# Patient Record
Sex: FEMALE | Race: BLACK OR AFRICAN AMERICAN | NOT HISPANIC OR LATINO | Employment: STUDENT | URBAN - METROPOLITAN AREA
[De-identification: names, ages, dates, MRNs, and addresses within clinical notes are randomized per-mention and may not be internally consistent; named-entity substitution may affect disease eponyms.]

---

## 2018-01-22 ENCOUNTER — DOCUMENTATION ONLY (OUTPATIENT)
Dept: FAMILY MEDICINE | Facility: CLINIC | Age: 23
End: 2018-01-22

## 2018-01-22 ENCOUNTER — OFFICE VISIT (OUTPATIENT)
Dept: FAMILY MEDICINE | Facility: CLINIC | Age: 23
End: 2018-01-22
Payer: COMMERCIAL

## 2018-01-22 VITALS
DIASTOLIC BLOOD PRESSURE: 52 MMHG | SYSTOLIC BLOOD PRESSURE: 94 MMHG | OXYGEN SATURATION: 97 % | WEIGHT: 114 LBS | HEIGHT: 62 IN | BODY MASS INDEX: 20.98 KG/M2 | HEART RATE: 62 BPM

## 2018-01-22 DIAGNOSIS — Z76.89 ESTABLISHING CARE WITH NEW DOCTOR, ENCOUNTER FOR: Primary | ICD-10-CM

## 2018-01-22 DIAGNOSIS — T78.1XXA ALLERGIC REACTION TO FOOD, INITIAL ENCOUNTER: ICD-10-CM

## 2018-01-22 DIAGNOSIS — Z00.00 WELLNESS EXAMINATION: ICD-10-CM

## 2018-01-22 PROCEDURE — 99999 PR PBB SHADOW E&M-NEW PATIENT-LVL IV: CPT | Mod: PBBFAC,,, | Performed by: FAMILY MEDICINE

## 2018-01-22 PROCEDURE — 99395 PREV VISIT EST AGE 18-39: CPT | Mod: S$GLB,,, | Performed by: FAMILY MEDICINE

## 2018-01-22 NOTE — PROGRESS NOTES
Subjective:       Patient ID: Cristina Curiel is a 22 year old female here to establish care     Chief Complaint: Establish care     HPI      Cristina Curiel is a 21 yo female here to establish care. Today pt is primarily concerned about:  - Possible allergies. Pt states she has discovered allergies to multiple agents. For example, her current laundry detergent leads to hives, as does food like pineapple. She is also allergic to zithromax (hives) and possibly peanut butter (N/V). She would like to further investigate these concerns.  - Difficulty gaining weight. Current BMI 20.85. States her diet has not been regular. Will skip breakfast and eat a light lunch/dinner. Also concern about allergies limits her diets. Denies hand tremors, palptations, sweating, but will get hot at night while sleeping.  - Possible pap smear     She has a past hx of migraines, with last migraine occurring a few months ago. Takes ibuprofen as needed.She is not currently on any regular medications. Family hx of HTN and stroke. Surgical hx of hernia repair (2004) and R ACL/meniscus repair (2007).      Menstrual cycle started at 15 yo. Regular, no previous concerns with spotting, bleeding or pain. Currently on Nexplanon and uses condoms irregularly. Sexually active with one male partner. Has had STD screens in the past and has never had STD.     She does not smoke or drink. Has smoked marijuana in the past but does not now.  Wears seatbelt regularly.   Denies symptoms of depression.     Review of Systems   Constitutional: Negative for chills and fever.   HENT: Negative for sore throat.    Eyes: Negative for visual disturbance.   Respiratory: Negative for cough and shortness of breath.    Cardiovascular: Negative for chest pain and leg swelling.   Gastrointestinal: Negative for abdominal pain, constipation and vomiting.   Genitourinary: Negative for difficulty urinating, dysuria and hematuria.   Musculoskeletal: Negative for arthralgias.    Neurological: Negative for dizziness and weakness.      Objective:   Physical Exam   Constitutional: She appears well-developed and well-nourished. No distress.   HENT:   Head: Normocephalic and atraumatic.   Mouth/Throat: Oropharynx is clear and moist. No oropharyngeal exudate.   Eyes: EOM are normal. Pupils are equal, round, and reactive to light.   Neck: Normal range of motion. Neck supple. No thyromegaly present.   Cardiovascular: Normal rate, regular rhythm, normal heart sounds and intact distal pulses.    Pulmonary/Chest: Effort normal and breath sounds normal. No respiratory distress. She has no wheezes.   Abdominal: Soft. Bowel sounds are normal. She exhibits no distension and no mass. There is no tenderness.   Musculoskeletal: She exhibits no edema.   Lymphadenopathy:     She has no cervical adenopathy.   Neurological: She is alert.   Skin: Skin is warm. No rash noted. No erythema.   Psychiatric: She has a normal mood and affect. Her behavior is normal.   Vitals reviewed.     Assessment:        1. Establishing care with new doctor, encounter for  2. Wellness examination  3. Allergic reaction to food, initial encounter       Plan:      1. Establishing care with new doctor, encounter for     2. Wellness examination  Patient has been advised to continue to maintain a healthy lifestyle, including regular exercise and consuming a well balanced diet.   - Microalbumin/creatinine urine ratio; Future  - Comprehensive metabolic panel; Future  - CBC auto differential; Future  - TSH; Future  - Lipid panel; Future     3. Allergic reaction to food, initial encounter  - Ambulatory referral to Nutrition Services  - Ambulatory referral to Allergy    Patient to return to clinic in approximately one month for Pap smear and Nexplanon removal.   Patient likely will need to start Depo-Provera for contraception in the future as she has issues with low body mass.    Portions of this note were created using Dragon voice  recognition software. There may be voice recognition errors found in the text, and attempts were made to correct these errors prior to signature    Nabor Abraham MD    Family Medicine  1/22/2018

## 2018-01-22 NOTE — PROGRESS NOTES
Pre-Visit Chart Review  For Appointment Scheduled on (1/22/18)    Health Maintenance Due   Topic Date Due    Lipid Panel  1997    HPV Vaccines (1 of 3 - Female 3 Dose Series) 12/13/2008    TETANUS VACCINE  12/13/2015    Influenza Vaccine  08/01/2017

## 2018-01-22 NOTE — MEDICAL/APP STUDENT
Subjective:       Patient ID: Cristina Curiel is a 22 year old female here to establish care    Chief Complaint: Establish care     HPI      Cristina Curiel is a 21 yo female here to establish care. Today pt is primarily concerned about:  - Possible allergies. Pt states she has discovered allergies to multiple agents. For example, her current laundry detergent leads to hives, as does food like pineapple. She is also allergic to zithromax (hives) and possibly peanut butter (N/V). She would like to further investigate these concerns.  - Difficulty gaining weight. Current BMI 20.85. States her diet has not been regular. Will skip breakfast and eat a light lunch/dinner. Also concern about allergies limits her diets. Denies hand tremors, palptations, sweating, but will get hot at night while sleeping.  - Possible pap smear    She has a past hx of migraines, with last migraine occurring a few months ago. Takes ibuprofen as needed.She is not currently on any regular medications. Family hx of HTN and stroke. Surgical hx of hernia repair (2004) and R ACL/meniscus repair (2007).     Menstrual cycle started at 15 yo. Regular, no previous concerns with spotting, bleeding or pain. Currently on Nexplanon and uses condoms irregularly. Sexually active with one male partner. Has had STD screens in the past and has never had STD.    She does not smoke or drink. Has smoked marijuana in the past but does not now.  Wears seatbelt regularly.   Denies symptoms of depression.    Review of Systems   Constitutional: Negative for chills and fever.   HENT: Negative for sore throat.    Eyes: Negative for visual disturbance.   Respiratory: Negative for cough and shortness of breath.    Cardiovascular: Negative for chest pain and leg swelling.   Gastrointestinal: Negative for abdominal pain, constipation and vomiting.   Genitourinary: Negative for difficulty urinating, dysuria and hematuria.   Musculoskeletal: Negative for arthralgias.   Neurological:  Negative for dizziness and weakness.       Objective:   Physical Exam   Constitutional: She appears well-developed and well-nourished. No distress.   HENT:   Head: Normocephalic and atraumatic.   Mouth/Throat: Oropharynx is clear and moist. No oropharyngeal exudate.   Eyes: EOM are normal. Pupils are equal, round, and reactive to light.   Neck: Normal range of motion. Neck supple. No thyromegaly present.   Cardiovascular: Normal rate, regular rhythm, normal heart sounds and intact distal pulses.    Pulmonary/Chest: Effort normal and breath sounds normal. No respiratory distress. She has no wheezes.   Abdominal: Soft. Bowel sounds are normal. She exhibits no distension and no mass. There is no tenderness.   Musculoskeletal: She exhibits no edema.   Lymphadenopathy:     She has no cervical adenopathy.   Neurological: She is alert.   Skin: Skin is warm. No rash noted. No erythema.   Psychiatric: She has a normal mood and affect. Her behavior is normal.   Vitals reviewed.      Assessment:        1. Establishing care with new doctor, encounter for  2. Wellness examination  3. Allergic reaction to food, initial encounter       Plan:     1. Establishing care with new doctor, encounter for    2. Wellness examination  - Microalbumin/creatinine urine ratio; Future  - Comprehensive metabolic panel; Future  - CBC auto differential; Future  - TSH; Future  - Lipid panel; Future    3. Allergic reaction to food, initial encounter  - Ambulatory referral to Nutrition Services  - Ambulatory referral to Allergy

## 2018-02-28 ENCOUNTER — HOSPITAL ENCOUNTER (EMERGENCY)
Facility: HOSPITAL | Age: 23
Discharge: HOME | End: 2018-02-28
Attending: EMERGENCY MEDICINE
Payer: COMMERCIAL

## 2018-02-28 VITALS
OXYGEN SATURATION: 100 % | SYSTOLIC BLOOD PRESSURE: 117 MMHG | RESPIRATION RATE: 20 BRPM | BODY MASS INDEX: 21.16 KG/M2 | TEMPERATURE: 99 F | WEIGHT: 115 LBS | HEIGHT: 62 IN | DIASTOLIC BLOOD PRESSURE: 61 MMHG | HEART RATE: 71 BPM

## 2018-02-28 DIAGNOSIS — S20.212A RIB CONTUSION, LEFT, INITIAL ENCOUNTER: Primary | ICD-10-CM
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
## 2018-02-28 LAB
B-HCG UR QL: NEGATIVE
CTP QC/QA: YES

## 2018-02-28 PROCEDURE — 25000003 PHARM REV CODE 250: Performed by: NURSE PRACTITIONER

## 2018-02-28 PROCEDURE — 99284 EMERGENCY DEPT VISIT MOD MDM: CPT | Mod: 25

## 2018-02-28 PROCEDURE — 81025 URINE PREGNANCY TEST: CPT | Performed by: EMERGENCY MEDICINE

## 2018-02-28 RX ORDER — IBUPROFEN 600 MG/1
600 TABLET ORAL
Status: COMPLETED | OUTPATIENT
Start: 2018-02-28 | End: 2018-02-28

## 2018-02-28 RX ORDER — IBUPROFEN 600 MG/1
600 TABLET ORAL EVERY 6 HOURS PRN
Qty: 20 TABLET | Refills: 0 | Status: SHIPPED | OUTPATIENT
Start: 2018-02-28 | End: 2019-01-25 | Stop reason: ALTCHOICE

## 2018-02-28 RX ADMIN — IBUPROFEN 600 MG: 600 TABLET ORAL at 04:02
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
## 2018-03-08 ENCOUNTER — LAB VISIT (OUTPATIENT)
Dept: LAB | Facility: HOSPITAL | Age: 23
End: 2018-03-08
Attending: FAMILY MEDICINE
Payer: COMMERCIAL

## 2018-03-08 DIAGNOSIS — Z00.00 WELLNESS EXAMINATION: ICD-10-CM

## 2018-03-08 LAB
ALBUMIN SERPL BCP-MCNC: 3.9 G/DL
ALP SERPL-CCNC: 77 U/L
ALT SERPL W/O P-5'-P-CCNC: 13 U/L
ANION GAP SERPL CALC-SCNC: 8 MMOL/L
AST SERPL-CCNC: 15 U/L
BASOPHILS # BLD AUTO: 0.03 K/UL
BASOPHILS NFR BLD: 0.6 %
BILIRUB SERPL-MCNC: 0.4 MG/DL
BUN SERPL-MCNC: 10 MG/DL
CALCIUM SERPL-MCNC: 9.7 MG/DL
CHLORIDE SERPL-SCNC: 104 MMOL/L
CHOLEST SERPL-MCNC: 147 MG/DL
CHOLEST/HDLC SERPL: 2.3 {RATIO}
CO2 SERPL-SCNC: 26 MMOL/L
CREAT SERPL-MCNC: 0.9 MG/DL
DIFFERENTIAL METHOD: ABNORMAL
EOSINOPHIL # BLD AUTO: 0.2 K/UL
EOSINOPHIL NFR BLD: 4.3 %
ERYTHROCYTE [DISTWIDTH] IN BLOOD BY AUTOMATED COUNT: 13.1 %
EST. GFR  (AFRICAN AMERICAN): >60 ML/MIN/1.73 M^2
EST. GFR  (NON AFRICAN AMERICAN): >60 ML/MIN/1.73 M^2
GLUCOSE SERPL-MCNC: 95 MG/DL
HCT VFR BLD AUTO: 41.1 %
HDLC SERPL-MCNC: 64 MG/DL
HDLC SERPL: 43.5 %
HGB BLD-MCNC: 12.7 G/DL
IMM GRANULOCYTES # BLD AUTO: 0 K/UL
IMM GRANULOCYTES NFR BLD AUTO: 0 %
LDLC SERPL CALC-MCNC: 74.8 MG/DL
LYMPHOCYTES # BLD AUTO: 2.4 K/UL
LYMPHOCYTES NFR BLD: 52.1 %
MCH RBC QN AUTO: 27.1 PG
MCHC RBC AUTO-ENTMCNC: 30.9 G/DL
MCV RBC AUTO: 88 FL
MONOCYTES # BLD AUTO: 0.5 K/UL
MONOCYTES NFR BLD: 11.6 %
NEUTROPHILS # BLD AUTO: 1.5 K/UL
NEUTROPHILS NFR BLD: 31.4 %
NONHDLC SERPL-MCNC: 83 MG/DL
NRBC BLD-RTO: 0 /100 WBC
PLATELET # BLD AUTO: 249 K/UL
PMV BLD AUTO: 11.5 FL
POTASSIUM SERPL-SCNC: 4.5 MMOL/L
PROT SERPL-MCNC: 8.1 G/DL
RBC # BLD AUTO: 4.68 M/UL
SODIUM SERPL-SCNC: 138 MMOL/L
TRIGL SERPL-MCNC: 41 MG/DL
TSH SERPL DL<=0.005 MIU/L-ACNC: 0.8 UIU/ML
WBC # BLD AUTO: 4.66 K/UL

## 2018-03-08 PROCEDURE — 85025 COMPLETE CBC W/AUTO DIFF WBC: CPT

## 2018-03-08 PROCEDURE — 84443 ASSAY THYROID STIM HORMONE: CPT

## 2018-03-08 PROCEDURE — 36415 COLL VENOUS BLD VENIPUNCTURE: CPT | Mod: PO

## 2018-03-08 PROCEDURE — 80053 COMPREHEN METABOLIC PANEL: CPT

## 2018-03-08 PROCEDURE — 80061 LIPID PANEL: CPT

## 2018-05-05 ENCOUNTER — OFFICE VISIT (OUTPATIENT)
Dept: FAMILY MEDICINE | Facility: CLINIC | Age: 23
End: 2018-05-05
Payer: COMMERCIAL

## 2018-05-05 VITALS
BODY MASS INDEX: 22.71 KG/M2 | HEIGHT: 62 IN | SYSTOLIC BLOOD PRESSURE: 118 MMHG | DIASTOLIC BLOOD PRESSURE: 62 MMHG | WEIGHT: 123.44 LBS

## 2018-05-05 DIAGNOSIS — Z12.4 CERVICAL CANCER SCREENING: ICD-10-CM

## 2018-05-05 DIAGNOSIS — G47.00 INSOMNIA, UNSPECIFIED TYPE: Primary | ICD-10-CM

## 2018-05-05 DIAGNOSIS — K21.9 GASTROESOPHAGEAL REFLUX DISEASE, ESOPHAGITIS PRESENCE NOT SPECIFIED: ICD-10-CM

## 2018-05-05 PROCEDURE — 99999 PR PBB SHADOW E&M-EST. PATIENT-LVL II: CPT | Mod: PBBFAC,,, | Performed by: FAMILY MEDICINE

## 2018-05-05 PROCEDURE — 99214 OFFICE O/P EST MOD 30 MIN: CPT | Mod: S$GLB,,, | Performed by: FAMILY MEDICINE

## 2018-05-05 PROCEDURE — 3008F BODY MASS INDEX DOCD: CPT | Mod: CPTII,S$GLB,, | Performed by: FAMILY MEDICINE

## 2018-05-05 RX ORDER — TRAZODONE HYDROCHLORIDE 50 MG/1
50 TABLET ORAL NIGHTLY
Qty: 30 TABLET | Refills: 11 | Status: SHIPPED | OUTPATIENT
Start: 2018-05-05 | End: 2019-01-25 | Stop reason: ALTCHOICE

## 2018-05-05 RX ORDER — PANTOPRAZOLE SODIUM 20 MG/1
20 TABLET, DELAYED RELEASE ORAL DAILY
Qty: 30 TABLET | Refills: 11 | Status: SHIPPED | OUTPATIENT
Start: 2018-05-05 | End: 2019-01-25 | Stop reason: ALTCHOICE

## 2018-05-05 NOTE — PROGRESS NOTES
Subjective:       Patient ID: Cristina Curiel is a 22 y.o. female.    Chief Complaint: Gastroesophageal Reflux ( insomnia)    HPI     Insomnia  Pt reports that she has had trouble sleeping since she started a new job.   Pt reports that she only gets approx 3 hours of sleep.   She stays awake for extended periods before falling asleep.   She has had symptoms for approx. 3 months.   Pt feels that she can function off little sleep and is unsure of whether she should start medications for this.   Usually eating a bowl of cereal helps her to get to sleep.   No EtOH usage.     Review of Systems   Constitutional: Negative for chills and fever.   HENT: Negative for sore throat.    Eyes: Negative for visual disturbance.   Respiratory: Negative for cough and shortness of breath.    Cardiovascular: Negative for chest pain and leg swelling.   Gastrointestinal: Negative for abdominal pain, blood in stool, constipation, diarrhea and vomiting.   Genitourinary: Negative for difficulty urinating, dysuria and hematuria.   Musculoskeletal: Negative for arthralgias and back pain.   Neurological: Negative for dizziness and weakness.       Objective:      Physical Exam   Constitutional: She appears well-developed and well-nourished. No distress.   HENT:   Head: Normocephalic and atraumatic.   Mouth/Throat: Oropharynx is clear and moist. No oropharyngeal exudate.   Eyes: Conjunctivae and EOM are normal.   Neck: Normal range of motion. Neck supple. No thyromegaly present.   Cardiovascular: Normal rate, regular rhythm and intact distal pulses.    Pulmonary/Chest: Effort normal. No respiratory distress.   Abdominal: Soft. She exhibits no distension and no mass. There is no tenderness.   Musculoskeletal: She exhibits no edema.   Lymphadenopathy:     She has no cervical adenopathy.   Neurological: She is alert.   Skin: Skin is warm. No rash noted. No erythema.   Psychiatric: She has a normal mood and affect. Her behavior is normal.   Vitals  reviewed.      Assessment:       1. Cervical cancer screening    2. Insomnia, unspecified type        Plan:       1. Insomnia, unspecified type  Start Trazodone today.   - traZODone (DESYREL) 50 MG tablet; Take 1 tablet (50 mg total) by mouth every evening.  Dispense: 30 tablet; Refill: 11    2. Cervical cancer screening  - Ambulatory referral to Gynecology    Portions of this note were created using Dragon voice recognition software. There may be voice recognition errors found in the text, and attempts were made to correct these errors prior to signature    Nabor Abraham MD    Family Medicine  5/5/2018

## 2018-05-08 ENCOUNTER — OFFICE VISIT (OUTPATIENT)
Dept: FAMILY MEDICINE | Facility: CLINIC | Age: 23
End: 2018-05-08
Payer: COMMERCIAL

## 2018-05-08 VITALS
HEIGHT: 62 IN | BODY MASS INDEX: 23.17 KG/M2 | SYSTOLIC BLOOD PRESSURE: 106 MMHG | WEIGHT: 125.88 LBS | DIASTOLIC BLOOD PRESSURE: 63 MMHG | HEART RATE: 67 BPM

## 2018-05-08 DIAGNOSIS — Z30.46 ENCOUNTER FOR NEXPLANON REMOVAL: Primary | ICD-10-CM

## 2018-05-08 DIAGNOSIS — Z30.013 ENCOUNTER FOR INITIAL PRESCRIPTION OF INJECTABLE CONTRACEPTIVE: ICD-10-CM

## 2018-05-08 LAB
B-HCG UR QL: NEGATIVE
CTP QC/QA: YES

## 2018-05-08 PROCEDURE — 99999 PR PBB SHADOW E&M-EST. PATIENT-LVL III: CPT | Mod: PBBFAC,,, | Performed by: NURSE PRACTITIONER

## 2018-05-08 PROCEDURE — 11982 REMOVE DRUG IMPLANT DEVICE: CPT | Mod: S$GLB,,, | Performed by: NURSE PRACTITIONER

## 2018-05-08 PROCEDURE — 3008F BODY MASS INDEX DOCD: CPT | Mod: CPTII,S$GLB,, | Performed by: NURSE PRACTITIONER

## 2018-05-08 PROCEDURE — 81025 URINE PREGNANCY TEST: CPT | Mod: S$GLB,,, | Performed by: NURSE PRACTITIONER

## 2018-05-08 PROCEDURE — 99214 OFFICE O/P EST MOD 30 MIN: CPT | Mod: 25,S$GLB,, | Performed by: NURSE PRACTITIONER

## 2018-05-08 PROCEDURE — 96372 THER/PROPH/DIAG INJ SC/IM: CPT | Mod: S$GLB,,, | Performed by: FAMILY MEDICINE

## 2018-05-08 RX ORDER — MEDROXYPROGESTERONE ACETATE 150 MG/ML
150 INJECTION, SUSPENSION INTRAMUSCULAR
Status: DISCONTINUED | OUTPATIENT
Start: 2018-05-08 | End: 2019-01-09

## 2018-05-08 RX ADMIN — MEDROXYPROGESTERONE ACETATE 150 MG: 150 INJECTION, SUSPENSION INTRAMUSCULAR at 11:05

## 2018-05-08 NOTE — PROGRESS NOTES
Patient identified by name and  with verbal feedback. Depo Provera 150 mg administered IM to right upper outer quadrant using aseptic technique. Patient tolerated well, no adverse reactions noted/reported. Next injection due 18-18 and has been scheduled for 18./lisseth

## 2018-05-10 ENCOUNTER — OFFICE VISIT (OUTPATIENT)
Dept: FAMILY MEDICINE | Facility: CLINIC | Age: 23
End: 2018-05-10
Payer: COMMERCIAL

## 2018-05-10 VITALS
DIASTOLIC BLOOD PRESSURE: 79 MMHG | BODY MASS INDEX: 23.17 KG/M2 | WEIGHT: 125.88 LBS | SYSTOLIC BLOOD PRESSURE: 128 MMHG | HEART RATE: 65 BPM | HEIGHT: 62 IN

## 2018-05-10 DIAGNOSIS — Z11.3 ENCOUNTER FOR SCREENING EXAMINATION FOR SEXUALLY TRANSMITTED DISEASE: Primary | ICD-10-CM

## 2018-05-10 PROCEDURE — 99999 PR PBB SHADOW E&M-EST. PATIENT-LVL III: CPT | Mod: PBBFAC,,, | Performed by: NURSE PRACTITIONER

## 2018-05-10 PROCEDURE — 87491 CHLMYD TRACH DNA AMP PROBE: CPT

## 2018-05-10 PROCEDURE — 3008F BODY MASS INDEX DOCD: CPT | Mod: CPTII,S$GLB,, | Performed by: NURSE PRACTITIONER

## 2018-05-10 PROCEDURE — 87480 CANDIDA DNA DIR PROBE: CPT

## 2018-05-10 PROCEDURE — 99213 OFFICE O/P EST LOW 20 MIN: CPT | Mod: S$GLB,,, | Performed by: NURSE PRACTITIONER

## 2018-05-10 PROCEDURE — 87510 GARDNER VAG DNA DIR PROBE: CPT

## 2018-05-10 NOTE — PROGRESS NOTES
"Chief Complaint   Patient presents with    STD CHECK       History of Present Illness: Cristina Curiel is a 22 y.o. female that presents today 5/10/2018 for   Pt presents for STD testing. She denies any vaginal symptoms. No other complaints or concerns noted.        Chief Complaint   Patient presents with    STD CHECK         History reviewed. No pertinent past medical history.    History reviewed. No pertinent surgical history.    Current Outpatient Prescriptions   Medication Sig Dispense Refill    ibuprofen (ADVIL,MOTRIN) 600 MG tablet Take 1 tablet (600 mg total) by mouth every 6 (six) hours as needed for Pain. 20 tablet 0    pantoprazole (PROTONIX) 20 MG tablet Take 1 tablet (20 mg total) by mouth once daily. 30 tablet 11    traZODone (DESYREL) 50 MG tablet Take 1 tablet (50 mg total) by mouth every evening. 30 tablet 11     Current Facility-Administered Medications   Medication Dose Route Frequency Provider Last Rate Last Dose    medroxyPROGESTERone (DEPO-PROVERA) injection 150 mg  150 mg Intramuscular Q90 Days Fabiana Guerrero, NP   150 mg at 18 1100       Review of patient's allergies indicates:   Allergen Reactions    Zithromax [azithromycin]        History reviewed. No pertinent family history.    Social History   Substance Use Topics    Smoking status: Never Smoker    Smokeless tobacco: Never Used    Alcohol use Yes      Comment: occas       OB History    Para Term  AB Living   0 0 0 0 0 0   SAB TAB Ectopic Multiple Live Births   0 0 0 0 0             Review of Symptoms:  GENERAL: Denies weight gain or weight loss. Feeling well overall.   SKIN: Denies rash or lesions.   HEAD: Denies head injury or headache.   ABDOMEN: No abdominal pain, constipation, diarrhea, nausea, vomiting or rectal bleeding.   URINARY: No frequency, dysuria, hematuria, or burning on urination.    /79   Pulse 65   Ht 5' 2" (1.575 m)   Wt 57.1 kg (125 lb 14.1 oz)   Physical Exam:  APPEARANCE: Well " nourished, well developed, in no acute distress.  SKIN: Normal skin turgor, no lesions.  RESPIRATORY: Normal respiratory effort with no retractions or use of accessory muscles  PELVIC: Normal external female genitalia without lesions. Normal hair distribution. Vagina moist and well rugated without lesions or discharge; no odor noted. Cervix pink and without lesions.     ASSESSMENT/PLAN:  Encounter for screening examination for sexually transmitted disease  -     C. trachomatis/N. gonorrhoeae by AMP DNA Cervix  -     Vaginosis Screen by DNA Probe        Follow-up:  Will f/u once results are received  RTC as needed

## 2018-05-10 NOTE — PROGRESS NOTES
Chief Complaint   Patient presents with    Contraception       History of Present Illness: Cristina Curiel is a 22 y.o. female that presents today 18 for   Pt presents to discuss birth control options and wants to have nexplanon removed. It was due to come in 2018. She does not wish to get another nexplanon. Her cycles have been sporadic since receiving the nexplanon. No other complaints or concerns noted.        Chief Complaint   Patient presents with    Contraception         History reviewed. No pertinent past medical history.    History reviewed. No pertinent surgical history.    Current Outpatient Prescriptions   Medication Sig Dispense Refill    ibuprofen (ADVIL,MOTRIN) 600 MG tablet Take 1 tablet (600 mg total) by mouth every 6 (six) hours as needed for Pain. 20 tablet 0    pantoprazole (PROTONIX) 20 MG tablet Take 1 tablet (20 mg total) by mouth once daily. 30 tablet 11    traZODone (DESYREL) 50 MG tablet Take 1 tablet (50 mg total) by mouth every evening. 30 tablet 11     Current Facility-Administered Medications   Medication Dose Route Frequency Provider Last Rate Last Dose    medroxyPROGESTERone (DEPO-PROVERA) injection 150 mg  150 mg Intramuscular Q90 Days Fabiana Guerrero NP   150 mg at 18 1100       Review of patient's allergies indicates:   Allergen Reactions    Zithromax [azithromycin]        History reviewed. No pertinent family history.    Social History   Substance Use Topics    Smoking status: Never Smoker    Smokeless tobacco: Not on file    Alcohol use Not on file       OB History    Para Term  AB Living   0 0 0 0 0 0   SAB TAB Ectopic Multiple Live Births   0 0 0 0 0             Review of Symptoms:  GENERAL: Denies weight gain or weight loss. Feeling well overall.   SKIN: Denies rash or lesions.   HEAD: Denies head injury or headache.   ABDOMEN: No abdominal pain, constipation, diarrhea, nausea, vomiting or rectal bleeding.   URINARY: No frequency,  "dysuria, hematuria, or burning on urination.      /63   Pulse 67   Ht 5' 2" (1.575 m)   Wt 57.1 kg (125 lb 14.1 oz)   Physical Exam:  APPEARANCE: Well nourished, well developed, in no acute distress.  SKIN: Normal skin turgor, no lesions.  RESPIRATORY: Normal respiratory effort with no retractions or use of accessory muscles  EXTREMITIES: Nexplanon in place in L upper inner arm. Area around initial insertion site cleaned with betadine. 2cc Lidocaine injected into area just above initial insertion site. Sterile gloves applied. Small incision made on initial insertion site with #11 blade. Nexplanon removed with hemostats. Device fully intact. Steri strips, band-aid, gauze and coban applied over incision.     ASSESSMENT/PLAN:  Encounter for Nexplanon removal    Encounter for initial prescription of injectable contraceptive  -     POCT urine pregnancy  -     medroxyPROGESTERone (DEPO-PROVERA) injection 150 mg; Inject 1 mL (150 mg total) into the muscle every 3 (three) months.        UPT (-)    The use of hormonal contraception has been fully discussed with the patient. We discussed all options including OCPs, transdermal patches, vaginal ring, Depo Provera injections, Nexplanon, and IUDs. Warnings about anticipated minor side effects such as breakthrough spotting, nausea, breast tenderness, weight changes, acne, headaches, etc were given.  She has been told of the more serious potential side effects such as MI, stroke, and deep vein thrombosis, all of which are very unlikely.  She has been asked to report any signs of such serious problems immediately. The need for additional protection, such as a condom, to prevent exposure to sexually transmitted diseases has also been discussed- the patient has been clearly reminded that no hormonal contraceptive method can protect her against diseases such as HIV and others. She understands and wishes to take the medication as prescribed. She wishes to begin " Depo-Provera.    -Discussed care of incision site and bandages with pt. Instructed her on signs of infection. Pt verbalized understanding.     Follow-up:  RTC q3 months for depo injections  RTC as needed

## 2018-05-11 ENCOUNTER — TELEPHONE (OUTPATIENT)
Dept: FAMILY MEDICINE | Facility: CLINIC | Age: 23
End: 2018-05-11

## 2018-05-11 LAB
CANDIDA RRNA VAG QL PROBE: NEGATIVE
G VAGINALIS RRNA GENITAL QL PROBE: POSITIVE
T VAGINALIS RRNA GENITAL QL PROBE: NEGATIVE

## 2018-05-11 RX ORDER — TINIDAZOLE 500 MG/1
2 TABLET ORAL
Qty: 8 TABLET | Refills: 0 | Status: SHIPPED | OUTPATIENT
Start: 2018-05-11 | End: 2018-05-13

## 2018-05-11 NOTE — TELEPHONE ENCOUNTER
Please let patient know her vaginosis culture came back positive for bacterial vaginosis- which is not a STD. It is a bacteria that some times over grows in the vagina and replaces normal vaginal harpal. I sent in a prescription for Tindamax - take 4 tabs on day 1 and take 4 tabs on day 2. Avoid alcohol while taking medication and for 24 hours after last dose. The screening was negative for yeast and trichomonas.    I am still waiting for gonorrhea and chlamydia results.    Thanks

## 2018-05-11 NOTE — TELEPHONE ENCOUNTER
Patient informed of results and recommendations as noted by JAYLAN Guerrero NP. Patient verbalized understanding.

## 2018-05-12 LAB
C TRACH DNA SPEC QL NAA+PROBE: DETECTED
N GONORRHOEA DNA SPEC QL NAA+PROBE: NOT DETECTED

## 2018-05-15 ENCOUNTER — TELEPHONE (OUTPATIENT)
Dept: FAMILY MEDICINE | Facility: CLINIC | Age: 23
End: 2018-05-15

## 2018-05-15 RX ORDER — DOXYCYCLINE HYCLATE 100 MG
100 TABLET ORAL 2 TIMES DAILY
Qty: 14 TABLET | Refills: 0 | Status: SHIPPED | OUTPATIENT
Start: 2018-05-15 | End: 2018-05-22

## 2018-05-15 NOTE — TELEPHONE ENCOUNTER
Please call and inform pt that her cervical culture was negative for gonorrhea and positive for chlamydia. I am sending in doxycylcine to treat the infection. Pt needs to complete full prescription. This is an STD, so your partner needs to be tested and treated.

## 2018-05-17 ENCOUNTER — TELEPHONE (OUTPATIENT)
Dept: FAMILY MEDICINE | Facility: CLINIC | Age: 23
End: 2018-05-17

## 2018-05-17 NOTE — TELEPHONE ENCOUNTER
----- Message from Tisha Zapata sent at 5/17/2018 12:12 PM CDT -----  Contact: self  Patient needs to know what the last two prescriptions called in for her were . Please yoli patient at [phone]. Thanks!

## 2018-05-18 NOTE — TELEPHONE ENCOUNTER
Called pt regarding below message. Pt states doxycycline is giving her an upset stomach with N/V. Informed pt to take medication with food to avoid upset stomach. Instructed pt if she is still having GI upset on Monday to call and we may be able to call in a new prescription. Pt verbalized understanding with no further questions.     ----- Message from Dolly Dick sent at 5/18/2018 10:28 AM CDT -----  Asking to discuss a different medication (no further details) call [phone] (home)

## 2018-05-22 ENCOUNTER — TELEPHONE (OUTPATIENT)
Dept: FAMILY MEDICINE | Facility: CLINIC | Age: 23
End: 2018-05-22

## 2018-05-22 NOTE — TELEPHONE ENCOUNTER
----- Message from Deana Bauer sent at 5/22/2018  8:31 AM CDT -----  Type:  Pharmacy Calling to Clarify an RX    Name of Caller: pt  Pharmacy Name:     [id number] [place] - [address]  [address] [place] [address]  Phone: [phone] Fax: [phone]    Prescription Name:  Calling about    A  Change  Of  Med , pt did  Not  Want to  Disclose the  meds  name  What do they need to clarify?:  na  Best Call Back Number:[phone]  Additional Information: pt  Is  Calling about   Med change

## 2018-05-22 NOTE — TELEPHONE ENCOUNTER
I called to verify the Rx Patient picked Rx up.   pharmacy didn't know what the message was about.

## 2018-05-22 NOTE — TELEPHONE ENCOUNTER
----- Message from Deana Bauer sent at 5/22/2018  8:31 AM CDT -----  Type:  Pharmacy Calling to Clarify an RX    Name of Caller: pt  Pharmacy Name:     [id number] [place] - [address]  [address] [place] [address]  Phone: [phone] Fax: [phone]    Prescription Name:  Calling about    A  Change  Of  Med , pt did  Not  Want to  Disclose the  meds  name  What do they need to clarify?:  na  Best Call Back Number:[phone]  Additional Information: pt  Is  Calling about   Med change

## 2018-05-23 NOTE — TELEPHONE ENCOUNTER
----- Message from Tisha Zapata sent at 5/23/2018  9:20 AM CDT -----  Contact: self  Type: Needs Medical Advice    Who Called:  sefl  Symptoms (please be specific):  NA  How long has patient had these symptoms:  YANNI  [facility]  Best Call Back Number: [phone]  Additional Information: Patient is requesting a different medication than the doxycycline (VIBRA-TABS) 100 MG tablet. Patient left a message yesterday. Thanks!    [id number] - [place] - [address]  [address] [address]  Phone: [phone] Fax: [phone]

## 2018-05-23 NOTE — TELEPHONE ENCOUNTER
Patient states that she is unable to tolerate the Doxycycline and is requesting a different medication. Please review and advise.    Left message informing patient that Fabiana is out of the office until tomorrow and we will call with an update once she reviews the message.

## 2018-05-24 RX ORDER — LEVOFLOXACIN 500 MG/1
500 TABLET, FILM COATED ORAL DAILY
Qty: 7 TABLET | Refills: 0 | Status: SHIPPED | OUTPATIENT
Start: 2018-05-24 | End: 2018-05-31

## 2018-05-24 NOTE — TELEPHONE ENCOUNTER
----- Message from Ricarda Perea sent at 5/24/2018 10:24 AM CDT -----  Contact: Cristina  Patient is calling as was told Fabiana would call her today. Please call [phone]. Thanks!

## 2018-07-16 ENCOUNTER — DOCUMENTATION ONLY (OUTPATIENT)
Dept: FAMILY MEDICINE | Facility: CLINIC | Age: 23
End: 2018-07-16

## 2018-07-16 NOTE — PROGRESS NOTES
Pre-Visit Chart Review  For Appointment Scheduled on 7/17/18    Health Maintenance Due   Topic Date Due    HPV Vaccines (1 of 3 - Female 3 Dose Series) 12/13/2008    TETANUS VACCINE  12/13/2015

## 2018-07-30 ENCOUNTER — TELEPHONE (OUTPATIENT)
Dept: FAMILY MEDICINE | Facility: CLINIC | Age: 23
End: 2018-07-30

## 2018-07-30 NOTE — TELEPHONE ENCOUNTER
"Spoke with patient and offered her an appointment with Dr. Willis on 7/31. Patient declined appointment stating she was taking "OTC medications".   "

## 2018-07-30 NOTE — TELEPHONE ENCOUNTER
----- Message from Juju Landry LPN sent at 7/30/2018  8:04 AM CDT -----  Contact: pt      ----- Message -----  From: Juju Landry LPN  Sent: 7/27/2018   5:25 PM  To: Juju Landry LPN        ----- Message -----  From: Monika Fajardo  Sent: 7/27/2018  10:25 AM  To: Moris Null Staff    Pt need to speak with nurse to schedule an appt be seen today for runny nose ,headache, fever,and coughing.  Pt did not want first available appt  which was 8-7-18, please call pt to advise  Call back   Thanks

## 2018-07-30 NOTE — TELEPHONE ENCOUNTER
Called pt regarding below message. Pt states she needs to schedule an appt to receive her Depo Provera injection. Appt date, time, and location given. Pt verbalized understanding with no further questions.     ----- Message from Leelee Gonzalez sent at 7/30/2018  9:15 AM CDT -----  Contact: [phone]  Patient is requesting a call back from the nurse to setup depo injection.  Please call the patient upon request at phone number [phone].

## 2018-08-02 ENCOUNTER — CLINICAL SUPPORT (OUTPATIENT)
Dept: FAMILY MEDICINE | Facility: CLINIC | Age: 23
End: 2018-08-02
Payer: COMMERCIAL

## 2018-08-02 DIAGNOSIS — Z30.42 ENCOUNTER FOR MANAGEMENT AND INJECTION OF DEPO-PROVERA: Primary | ICD-10-CM

## 2018-08-02 PROCEDURE — 99999 PR PBB SHADOW E&M-EST. PATIENT-LVL II: CPT | Mod: PBBFAC,,,

## 2018-08-02 PROCEDURE — 96372 THER/PROPH/DIAG INJ SC/IM: CPT | Mod: S$GLB,,, | Performed by: NURSE PRACTITIONER

## 2018-08-02 RX ADMIN — MEDROXYPROGESTERONE ACETATE 150 MG: 150 INJECTION, SUSPENSION INTRAMUSCULAR at 09:08

## 2018-08-02 NOTE — PROGRESS NOTES
Patient identified by name and  with verbal feedback. Depo Provera 150 mg administered IM to right upper outer quadrant using aseptic technique. Patient tolerated well, no adverse reactions noted/reported. Next injection due 10/18- and has been scheduled for 10/18./bpj

## 2018-08-10 ENCOUNTER — LAB VISIT (OUTPATIENT)
Dept: LAB | Facility: HOSPITAL | Age: 23
End: 2018-08-10
Attending: NURSE PRACTITIONER
Payer: COMMERCIAL

## 2018-08-10 ENCOUNTER — OFFICE VISIT (OUTPATIENT)
Dept: FAMILY MEDICINE | Facility: CLINIC | Age: 23
End: 2018-08-10
Payer: COMMERCIAL

## 2018-08-10 VITALS
HEIGHT: 62 IN | HEART RATE: 55 BPM | WEIGHT: 123.69 LBS | DIASTOLIC BLOOD PRESSURE: 64 MMHG | SYSTOLIC BLOOD PRESSURE: 109 MMHG | BODY MASS INDEX: 22.76 KG/M2

## 2018-08-10 DIAGNOSIS — Z11.3 ENCOUNTER FOR SCREENING EXAMINATION FOR SEXUALLY TRANSMITTED DISEASE: Primary | ICD-10-CM

## 2018-08-10 DIAGNOSIS — Z11.3 ENCOUNTER FOR SCREENING EXAMINATION FOR SEXUALLY TRANSMITTED DISEASE: ICD-10-CM

## 2018-08-10 LAB
CANDIDA RRNA VAG QL PROBE: NEGATIVE
G VAGINALIS RRNA GENITAL QL PROBE: NEGATIVE
T VAGINALIS RRNA GENITAL QL PROBE: NEGATIVE

## 2018-08-10 PROCEDURE — 3008F BODY MASS INDEX DOCD: CPT | Mod: CPTII,S$GLB,, | Performed by: NURSE PRACTITIONER

## 2018-08-10 PROCEDURE — 87491 CHLMYD TRACH DNA AMP PROBE: CPT

## 2018-08-10 PROCEDURE — 87660 TRICHOMONAS VAGIN DIR PROBE: CPT

## 2018-08-10 PROCEDURE — 36415 COLL VENOUS BLD VENIPUNCTURE: CPT | Mod: PO

## 2018-08-10 PROCEDURE — 99999 PR PBB SHADOW E&M-EST. PATIENT-LVL III: CPT | Mod: PBBFAC,,, | Performed by: NURSE PRACTITIONER

## 2018-08-10 PROCEDURE — 86592 SYPHILIS TEST NON-TREP QUAL: CPT

## 2018-08-10 PROCEDURE — 99213 OFFICE O/P EST LOW 20 MIN: CPT | Mod: S$GLB,,, | Performed by: NURSE PRACTITIONER

## 2018-08-10 PROCEDURE — 86703 HIV-1/HIV-2 1 RESULT ANTBDY: CPT

## 2018-08-10 NOTE — LETTER
August 10, 2018      [facility]  [address] [place] [address]  Phone: [phone]  Fax: [phone]       Patient: Cristina Curiel   YOB: 1995  Date of Visit: 08/10/2018    To Whom It May Concern:    Carmelita Curiel  was at [facility] on 08/10/2018. She may return to work on 8/10/18 with no restrictions. If you have any questions or concerns, or if I can be of further assistance, please do not hesitate to contact me.    Sincerely,      Fabiana Guerrero NP.

## 2018-08-11 LAB
C TRACH DNA SPEC QL NAA+PROBE: DETECTED
N GONORRHOEA DNA SPEC QL NAA+PROBE: NOT DETECTED
RPR SER QL: NORMAL

## 2018-08-13 ENCOUNTER — TELEPHONE (OUTPATIENT)
Dept: FAMILY MEDICINE | Facility: CLINIC | Age: 23
End: 2018-08-13

## 2018-08-13 LAB — HIV 1+2 AB+HIV1 P24 AG SERPL QL IA: NEGATIVE

## 2018-08-13 RX ORDER — LEVOFLOXACIN 500 MG/1
500 TABLET, FILM COATED ORAL DAILY
Qty: 7 TABLET | Refills: 0 | Status: SHIPPED | OUTPATIENT
Start: 2018-08-13 | End: 2018-09-10 | Stop reason: SDUPTHER

## 2018-08-13 NOTE — PROGRESS NOTES
Chief Complaint   Patient presents with    Exposure to STD       History of Present Illness: Cristina Curiel is a 22 y.o. female that presents today 8/10/2018 for   Pt presents today for STD testing, including blood work. Pt denies any symptoms at this time. No other complaints or concerns noted.         Chief Complaint   Patient presents with    Exposure to STD         History reviewed. No pertinent past medical history.    History reviewed. No pertinent surgical history.    Current Outpatient Medications   Medication Sig Dispense Refill    ibuprofen (ADVIL,MOTRIN) 600 MG tablet Take 1 tablet (600 mg total) by mouth every 6 (six) hours as needed for Pain. 20 tablet 0    pantoprazole (PROTONIX) 20 MG tablet Take 1 tablet (20 mg total) by mouth once daily. 30 tablet 11    traZODone (DESYREL) 50 MG tablet Take 1 tablet (50 mg total) by mouth every evening. 30 tablet 11     Current Facility-Administered Medications   Medication Dose Route Frequency Provider Last Rate Last Dose    medroxyPROGESTERone (DEPO-PROVERA) injection 150 mg  150 mg Intramuscular Q90 Days Fabiana Guerrero, NP   150 mg at 18 0939       Review of patient's allergies indicates:   Allergen Reactions    Zithromax [azithromycin]        History reviewed. No pertinent family history.    Social History     Tobacco Use    Smoking status: Never Smoker    Smokeless tobacco: Never Used   Substance Use Topics    Alcohol use: Yes     Comment: occas    Drug use: Yes     Types: Marijuana       OB History    Para Term  AB Living   0 0 0 0 0 0   SAB TAB Ectopic Multiple Live Births   0 0 0 0 0             Review of Symptoms:  GENERAL: Denies weight gain or weight loss. Feeling well overall.   SKIN: Denies rash or lesions.   HEAD: Denies head injury or headache.   ABDOMEN: No abdominal pain, constipation, diarrhea, nausea, vomiting or rectal bleeding.   URINARY: No frequency, dysuria, hematuria, or burning on urination.    /64    "Pulse (!) 55   Ht 5' 2" (1.575 m)   Wt 56.1 kg (123 lb 10.9 oz)   LMP  (LMP Unknown)   Physical Exam:  APPEARANCE: Well nourished, well developed, in no acute distress.  SKIN: Normal skin turgor, no lesions.  RESPIRATORY: Normal respiratory effort with no retractions or use of accessory muscles  PELVIC: Normal external female genitalia without lesions. Normal hair distribution. Vagina moist and well rugated without lesions or discharge. Cervix pink and without lesions.     ASSESSMENT/PLAN:  Encounter for screening examination for sexually transmitted disease  -     HIV-1 and HIV-2 antibodies; Future; Expected date: 08/10/2018  -     RPR; Future; Expected date: 08/10/2018  -     Vaginosis Screen by DNA Probe  -     C. trachomatis/N. gonorrhoeae by AMP DNA [facility]; Cervix        Follow-up:  Will  F/u once results are received  RTC as needed    "

## 2018-08-13 NOTE — TELEPHONE ENCOUNTER
Please call pt and inform her that her cervical cultures were negative for gonorrhea and positive for chlamydia. This is an STD, so her partner needs to be tested and treated as well. I sent in antibiotics that she will take for 7 days.     Her vaginosis screening was negative for yeast, bacteria and trichomonas. Also, her HIV and syphilis results were negative.     Thanks

## 2018-08-14 NOTE — TELEPHONE ENCOUNTER
Called pt regarding results. Informed her of her prescription at the pharmacy. Pt verbalized understanding with no further questions

## 2018-09-05 ENCOUNTER — TELEPHONE (OUTPATIENT)
Dept: FAMILY MEDICINE | Facility: CLINIC | Age: 23
End: 2018-09-05

## 2018-09-05 NOTE — TELEPHONE ENCOUNTER
Patient informed that she wouldhave to pay for the next prescription out of pocket because insurance may not cover the next fill

## 2018-09-10 ENCOUNTER — TELEPHONE (OUTPATIENT)
Dept: FAMILY MEDICINE | Facility: CLINIC | Age: 23
End: 2018-09-10

## 2018-09-10 RX ORDER — LEVOFLOXACIN 500 MG/1
500 TABLET, FILM COATED ORAL DAILY
Qty: 7 TABLET | Refills: 0 | Status: SHIPPED | OUTPATIENT
Start: 2018-09-10 | End: 2018-09-17

## 2018-09-10 RX ORDER — METRONIDAZOLE 500 MG/1
500 TABLET ORAL 2 TIMES DAILY
Qty: 14 TABLET | Refills: 0 | Status: SHIPPED | OUTPATIENT
Start: 2018-09-10 | End: 2018-09-10 | Stop reason: CLARIF

## 2018-09-10 NOTE — TELEPHONE ENCOUNTER
----- Message from Tomas Alcaraz MA sent at 9/10/2018 10:47 AM CDT -----  Contact: Cristina  Patient is willing to pay  for new prescription that was lost if you send a new one  ----- Message -----  From: Destin Edge  Sent: 9/10/2018   8:55 AM  To: Cesar CATHERINE Staff    Calling to get an update on the prescription for antibiotics. She was waiting call back last week regarding what her insurance would cover. Please call her back [phone] (home)   Thanks!

## 2018-09-10 NOTE — TELEPHONE ENCOUNTER
Called and advised patient that medication was sent in and that she would need to pick it up and get started on it as there were no other options of medication for her to take. She is allergic to the first line medication, so this medication is the second line. I also, re-interated that her partner needs to be tested and treated as well. She verbalized understanding.

## 2018-10-18 ENCOUNTER — CLINICAL SUPPORT (OUTPATIENT)
Dept: FAMILY MEDICINE | Facility: CLINIC | Age: 23
End: 2018-10-18
Payer: COMMERCIAL

## 2018-10-18 DIAGNOSIS — Z30.42 ENCOUNTER FOR MANAGEMENT AND INJECTION OF DEPO-PROVERA: Primary | ICD-10-CM

## 2018-10-18 PROCEDURE — 99999 PR PBB SHADOW E&M-EST. PATIENT-LVL III: CPT | Mod: PBBFAC,,,

## 2018-10-18 NOTE — LETTER
October 18, 2018      [facility]  [address] [place] [address]  Phone: [phone]  Fax: [phone]       Patient: Cristina Curiel   YOB: 1995  Date of Visit: 10/18/2018    To Whom It May Concern:    Carmelita Curiel  was at [facility] on 10/18/2018. She may return to work/school on 10/18/18 with no restrictions. If you have any questions or concerns, or if I can be of further assistance, please do not hesitate to contact me.    Sincerely,    Gloria Dick LPN

## 2018-10-18 NOTE — PROGRESS NOTES
Administered 150 mgs of Depo Provera  IM to L Glt. Patient tolerated well. No bleeding at insertion site noted. Pain scale 0/10. Allergies reviewed. Aseptic technique maintained.   Letter given to return to school. Also, her next appt was scheduled.

## 2018-10-18 NOTE — NURSING
2 Patient identifiers used (name & ). Administered 150 mgs of Depo Provera  IM to L Glt. Patient tolerated well. No bleeding at insertion site noted. Pain scale 0/10. Allergies reviewed. Aseptic technique maintained.

## 2019-01-08 ENCOUNTER — OFFICE VISIT (OUTPATIENT)
Dept: FAMILY MEDICINE | Facility: CLINIC | Age: 24
End: 2019-01-08
Payer: COMMERCIAL

## 2019-01-08 VITALS
DIASTOLIC BLOOD PRESSURE: 75 MMHG | HEIGHT: 62 IN | SYSTOLIC BLOOD PRESSURE: 121 MMHG | WEIGHT: 119.94 LBS | BODY MASS INDEX: 22.07 KG/M2 | HEART RATE: 56 BPM

## 2019-01-08 DIAGNOSIS — Z30.42 ENCOUNTER FOR MANAGEMENT AND INJECTION OF DEPO-PROVERA: ICD-10-CM

## 2019-01-08 DIAGNOSIS — Z11.3 ENCOUNTER FOR SCREENING EXAMINATION FOR SEXUALLY TRANSMITTED DISEASE: Primary | ICD-10-CM

## 2019-01-08 PROCEDURE — 99999 PR PBB SHADOW E&M-EST. PATIENT-LVL III: CPT | Mod: PBBFAC,,, | Performed by: NURSE PRACTITIONER

## 2019-01-08 PROCEDURE — 96372 PR INJECTION,THERAP/PROPH/DIAG2ST, IM OR SUBCUT: ICD-10-PCS | Mod: S$GLB,,, | Performed by: NURSE PRACTITIONER

## 2019-01-08 PROCEDURE — 99213 OFFICE O/P EST LOW 20 MIN: CPT | Mod: 25,S$GLB,, | Performed by: NURSE PRACTITIONER

## 2019-01-08 PROCEDURE — 87491 CHLMYD TRACH DNA AMP PROBE: CPT

## 2019-01-08 PROCEDURE — 3008F BODY MASS INDEX DOCD: CPT | Mod: CPTII,S$GLB,, | Performed by: NURSE PRACTITIONER

## 2019-01-08 PROCEDURE — 96372 THER/PROPH/DIAG INJ SC/IM: CPT | Mod: S$GLB,,, | Performed by: NURSE PRACTITIONER

## 2019-01-08 PROCEDURE — 99999 PR PBB SHADOW E&M-EST. PATIENT-LVL III: ICD-10-PCS | Mod: PBBFAC,,, | Performed by: NURSE PRACTITIONER

## 2019-01-08 PROCEDURE — 99213 PR OFFICE/OUTPT VISIT, EST, LEVL III, 20-29 MIN: ICD-10-PCS | Mod: 25,S$GLB,, | Performed by: NURSE PRACTITIONER

## 2019-01-08 PROCEDURE — 87480 CANDIDA DNA DIR PROBE: CPT

## 2019-01-08 PROCEDURE — 3008F PR BODY MASS INDEX (BMI) DOCUMENTED: ICD-10-PCS | Mod: CPTII,S$GLB,, | Performed by: NURSE PRACTITIONER

## 2019-01-08 RX ADMIN — MEDROXYPROGESTERONE ACETATE 150 MG: 150 INJECTION, SUSPENSION INTRAMUSCULAR at 11:01

## 2019-01-08 NOTE — NURSING
2 Patient identifiers used (name & ). Administered 150 mgs of Depo Provera IM to R Glt. Patient tolerated well. No bleeding at insertion site noted. Pain scale 0/10. Regular interval cycle maintained. Allergies reviewed. Aseptic technique maintained.     Next Injection due: 3/26-    Letter typed for  administration

## 2019-01-08 NOTE — PROGRESS NOTES
2 Patient identifiers used (name & ). Administered 150 mgs of Depo Provera IM to R [place]. Patient tolerated well. No bleeding at insertion site noted. Pain scale 0/10. Regular interval cycle maintained. Allergies reviewed. Aseptic technique maintained.     Next Injection due: 3/26-  Letter typed for  administration of Depo Provera.

## 2019-01-08 NOTE — LETTER
January 8, 2019    [facility]  [address] [place] [address]  Phone: [phone]  Fax: [phone] January 8, 2019                      Patient: Cristina Curiel   YOB: 1995   Date of Visit: 1/8/2019       To Whom It May Concern:      HEALTHCARE PROVIDER AUTHORIZATION TO ADMINISTER MEDICATION     As of today, 1/8/2019, the following medication has been prescribed for Cristina for the treatment of birth control. 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 This medication requires administration on a 4 time a year dosing flexibility. She received a dose in office today 1/8/2019.Her next injection will be due between March 26, 2019 and April 9, 2019.     Please give:    Medication: Depo Provera   Dosage: 150mg IM  Time: approximately every three months/4 times a year dosing flexibility      Sincerely,      Fabiana Guerrero NP/  Gloria Dick LPN

## 2019-01-08 NOTE — PROGRESS NOTES
"Chief Complaint   Patient presents with    Exposure to STD       History of Present Illness: Cristina Curiel is a 23 y.o. female that presents today 2019 for   Pt presents today for STD testing. She denies any vaginal symptoms. No other complaints or concerns noted.       Chief Complaint   Patient presents with    Exposure to STD         History reviewed. No pertinent past medical history.    History reviewed. No pertinent surgical history.    Current Outpatient Medications   Medication Sig Dispense Refill    ibuprofen (ADVIL,MOTRIN) 600 MG tablet Take 1 tablet (600 mg total) by mouth every 6 (six) hours as needed for Pain. 20 tablet 0    pantoprazole (PROTONIX) 20 MG tablet Take 1 tablet (20 mg total) by mouth once daily. 30 tablet 11    traZODone (DESYREL) 50 MG tablet Take 1 tablet (50 mg total) by mouth every evening. 30 tablet 11     Current Facility-Administered Medications   Medication Dose Route Frequency Provider Last Rate Last Dose    medroxyPROGESTERone (DEPO-PROVERA) injection 150 mg  150 mg Intramuscular Q90 Days Fabiana Guerrero, NP   150 mg at 18 0939       Review of patient's allergies indicates:   Allergen Reactions    Zithromax [azithromycin]        History reviewed. No pertinent family history.    Social History     Tobacco Use    Smoking status: Never Smoker    Smokeless tobacco: Never Used   Substance Use Topics    Alcohol use: Yes     Comment: occas    Drug use: Yes     Types: Marijuana       OB History    Para Term  AB Living   0 0 0 0 0 0   SAB TAB Ectopic Multiple Live Births   0 0 0 0 0             Review of Symptoms:  GENERAL: Denies weight gain or weight loss. Feeling well overall.   SKIN: Denies rash or lesions.   HEAD: Denies head injury or headache.   ABDOMEN: No abdominal pain, constipation, diarrhea, nausea, vomiting or rectal bleeding.   URINARY: No frequency, dysuria, hematuria, or burning on urination.    /75   Pulse (!) 56   Ht 5' 2" " (1.575 m)   Wt 54.4 kg (119 lb 14.9 oz)   Physical Exam:  APPEARANCE: Well nourished, well developed, in no acute distress.  SKIN: Normal skin turgor, no lesions.  RESPIRATORY: Normal respiratory effort with no retractions or use of accessory muscles  PELVIC: Normal external female genitalia without lesions. Normal hair distribution. Adequate perineal body, normal urethral meatus. Urethra with no masses. Vagina moist and well rugated without lesions, + brown discharge. Cervix pink and without lesions.     ASSESSMENT/PLAN:  Encounter for screening examination for sexually transmitted disease  -     Vaginosis Screen by DNA Probe  -     C. trachomatis/N. gonorrhoeae by AMP DNA [facility]; Cervix      Follow-up:  Will f/u once results are received  RTC as needed

## 2019-01-09 RX ORDER — MEDROXYPROGESTERONE ACETATE 150 MG/ML
150 INJECTION, SUSPENSION INTRAMUSCULAR
Status: DISCONTINUED | OUTPATIENT
Start: 2019-03-26 | End: 2022-05-10 | Stop reason: CLARIF

## 2019-01-10 ENCOUNTER — TELEPHONE (OUTPATIENT)
Dept: FAMILY MEDICINE | Facility: CLINIC | Age: 24
End: 2019-01-10

## 2019-01-10 LAB
C TRACH DNA SPEC QL NAA+PROBE: NOT DETECTED
N GONORRHOEA DNA SPEC QL NAA+PROBE: NOT DETECTED

## 2019-01-10 NOTE — TELEPHONE ENCOUNTER
Advised patient that her vaginal screen, gonorrhea, and chlamydia were all negative. Patient was happy and verbalized understanding.

## 2019-01-10 NOTE — TELEPHONE ENCOUNTER
Please inform pt:    Your vaginosis culture came back negative. You do not have yeast, bacterial vaginosis, or trichomonas.    Your gonorrhea and chlamydia culture came back negative.    Thanks

## 2019-01-25 ENCOUNTER — HOSPITAL ENCOUNTER (EMERGENCY)
Facility: HOSPITAL | Age: 24
Discharge: HOME | End: 2019-01-25
Attending: EMERGENCY MEDICINE
Payer: COMMERCIAL

## 2019-01-25 VITALS
SYSTOLIC BLOOD PRESSURE: 134 MMHG | BODY MASS INDEX: 22.29 KG/M2 | TEMPERATURE: 98 F | DIASTOLIC BLOOD PRESSURE: 90 MMHG | WEIGHT: 121.13 LBS | HEART RATE: 65 BPM | RESPIRATION RATE: 18 BRPM | HEIGHT: 62 IN | OXYGEN SATURATION: 99 %

## 2019-01-25 DIAGNOSIS — R20.2 RIGHT HAND PARESTHESIA: ICD-10-CM

## 2019-01-25 DIAGNOSIS — M25.531 RIGHT WRIST PAIN: Primary | ICD-10-CM

## 2019-01-25 LAB
B-HCG UR QL: NEGATIVE
CTP QC/QA: YES

## 2019-01-25 PROCEDURE — 99284 EMERGENCY DEPT VISIT MOD MDM: CPT | Mod: 25

## 2019-01-25 PROCEDURE — 29125 APPL SHORT ARM SPLINT STATIC: CPT | Mod: RT

## 2019-01-25 PROCEDURE — 81025 URINE PREGNANCY TEST: CPT | Performed by: PHYSICIAN ASSISTANT

## 2019-01-25 PROCEDURE — 63600175 PHARM REV CODE 636 W HCPCS: Performed by: PHYSICIAN ASSISTANT

## 2019-01-25 PROCEDURE — 96372 THER/PROPH/DIAG INJ SC/IM: CPT

## 2019-01-25 RX ORDER — KETOROLAC TROMETHAMINE 30 MG/ML
30 INJECTION, SOLUTION INTRAMUSCULAR; INTRAVENOUS
Status: COMPLETED | OUTPATIENT
Start: 2019-01-25 | End: 2019-01-25

## 2019-01-25 RX ORDER — IBUPROFEN 600 MG/1
600 TABLET ORAL EVERY 8 HOURS PRN
Qty: 20 TABLET | Refills: 0 | Status: SHIPPED | OUTPATIENT
Start: 2019-01-25 | End: 2020-09-04

## 2019-01-25 RX ADMIN — KETOROLAC TROMETHAMINE 30 MG: 30 INJECTION, SOLUTION INTRAMUSCULAR at 11:01

## 2019-01-25 NOTE — ED PROVIDER NOTES
"Encounter Date: 1/25/2019       History     Chief Complaint   Patient presents with    Hand Injury     right. reports that she began to have pain and tingling when she "went to wipe" yesterday     24 y/o female c/o rt arm pain described as numbness/tingling that starts at elbow and extends down to fingers x 1 day. Pt reports pain onset last night when "whiping" after using the restroom. She denies any injury, trauma or fall.  No shoulder or neck pain.  She has not taken any medication for her symptoms.       The history is provided by the patient.     Review of patient's allergies indicates:   Allergen Reactions    Zithromax [azithromycin]      History reviewed. No pertinent past medical history.  History reviewed. No pertinent surgical history.  History reviewed. No pertinent family history.  Social History     Tobacco Use    Smoking status: Never Smoker    Smokeless tobacco: Never Used   Substance Use Topics    Alcohol use: Yes     Comment: occas    Drug use: Yes     Types: Marijuana     Review of Systems   Constitutional: Negative for chills and fever.   Musculoskeletal: Positive for myalgias. Negative for arthralgias, back pain, joint swelling, neck pain and neck stiffness.   Skin: Negative for color change, pallor, rash and wound.   Neurological: Positive for numbness. Negative for weakness.   Hematological: Does not bruise/bleed easily.       Physical Exam     Initial Vitals [01/25/19 1024]   BP Pulse Resp Temp SpO2   (!) 134/90 65 18 98.3 °F (36.8 °C) 99 %      MAP       --         Physical Exam    Nursing note reviewed.  Constitutional: She appears well-developed and well-nourished. She is not diaphoretic. No distress.   HENT:   Head: Normocephalic and atraumatic.   Cardiovascular: Intact distal pulses.   Musculoskeletal: Normal range of motion. She exhibits tenderness. She exhibits no edema.   Mild reproducible TTP noted to volar right wrist.  No erythema or edema.  No bony TTP noted to RUE.  No " midline cervical spinous tenderness noted.  No decreased ROM, decreased strength or loss of sensation to RUE.  Palpable 2+ radial pulse. Negative Tinel's and Phalen's.    Neurological: She is alert and oriented to person, place, and time. She has normal strength. No sensory deficit.   Skin: Skin is warm and dry. Capillary refill takes less than 2 seconds. No rash and no abscess noted. No erythema.   Psychiatric: She has a normal mood and affect.         ED Course   Procedures  Labs Reviewed   POCT URINE PREGNANCY          Imaging Results    None          Medical Decision Making:   Differential Diagnosis:   Fracture  Dislocation  Sprain  Contusion  Strain  Spasm  Neuropathy  Paresthesia         APC / Resident Notes:   No bony tenderness noted.  Symptoms are most consistent with paresthesias, secondary to full flexion or right wrist.  Possible carpal tunnel syndrome, but it doesn't fully fit with her history of symptoms.  No need for further imaging or testing here in the ED.  She is given a wrist brace and given a dose of IM Toradol.  She is discharged home to follow-up with ortho vs. Neuro for re-evaluation and further treatment options. She voices understanding and is agreeable to the plan.  She is given specific return precautions.                    Clinical Impression:   The primary encounter diagnosis was Right wrist pain. A diagnosis of Right hand paresthesia was also pertinent to this visit.                             Priti Box PA-C  01/25/19 [id number]

## 2019-01-31 ENCOUNTER — TELEPHONE (OUTPATIENT)
Dept: ORTHOPEDICS | Facility: CLINIC | Age: 24
End: 2019-01-31

## 2019-02-07 DIAGNOSIS — M25.531 RIGHT WRIST PAIN: Primary | ICD-10-CM

## 2019-02-21 ENCOUNTER — OFFICE VISIT (OUTPATIENT)
Dept: FAMILY MEDICINE | Facility: CLINIC | Age: 24
End: 2019-02-21
Payer: COMMERCIAL

## 2019-02-21 ENCOUNTER — TELEPHONE (OUTPATIENT)
Dept: FAMILY MEDICINE | Facility: CLINIC | Age: 24
End: 2019-02-21

## 2019-02-21 VITALS
HEIGHT: 62 IN | HEART RATE: 60 BPM | DIASTOLIC BLOOD PRESSURE: 73 MMHG | TEMPERATURE: 99 F | SYSTOLIC BLOOD PRESSURE: 119 MMHG | WEIGHT: 121.94 LBS | OXYGEN SATURATION: 98 % | BODY MASS INDEX: 22.44 KG/M2

## 2019-02-21 DIAGNOSIS — R19.7 DIARRHEA, UNSPECIFIED TYPE: ICD-10-CM

## 2019-02-21 DIAGNOSIS — R53.83 FATIGUE, UNSPECIFIED TYPE: Primary | ICD-10-CM

## 2019-02-21 DIAGNOSIS — R11.0 NAUSEA: ICD-10-CM

## 2019-02-21 DIAGNOSIS — R68.83 CHILLS: ICD-10-CM

## 2019-02-21 LAB
INFLUENZA A, MOLECULAR: NEGATIVE
INFLUENZA B, MOLECULAR: NEGATIVE
SPECIMEN SOURCE: NORMAL

## 2019-02-21 PROCEDURE — 87502 INFLUENZA DNA AMP PROBE: CPT | Mod: PO

## 2019-02-21 PROCEDURE — 3008F BODY MASS INDEX DOCD: CPT | Mod: CPTII,S$GLB,, | Performed by: NURSE PRACTITIONER

## 2019-02-21 PROCEDURE — 99999 PR PBB SHADOW E&M-EST. PATIENT-LVL IV: ICD-10-PCS | Mod: PBBFAC,,, | Performed by: NURSE PRACTITIONER

## 2019-02-21 PROCEDURE — 3008F PR BODY MASS INDEX (BMI) DOCUMENTED: ICD-10-PCS | Mod: CPTII,S$GLB,, | Performed by: NURSE PRACTITIONER

## 2019-02-21 PROCEDURE — 99213 OFFICE O/P EST LOW 20 MIN: CPT | Mod: S$GLB,,, | Performed by: NURSE PRACTITIONER

## 2019-02-21 PROCEDURE — 99213 PR OFFICE/OUTPT VISIT, EST, LEVL III, 20-29 MIN: ICD-10-PCS | Mod: S$GLB,,, | Performed by: NURSE PRACTITIONER

## 2019-02-21 PROCEDURE — 99999 PR PBB SHADOW E&M-EST. PATIENT-LVL IV: CPT | Mod: PBBFAC,,, | Performed by: NURSE PRACTITIONER

## 2019-02-21 RX ORDER — ONDANSETRON 4 MG/1
4 TABLET, FILM COATED ORAL 2 TIMES DAILY PRN
Qty: 20 TABLET | Refills: 0 | Status: SHIPPED | OUTPATIENT
Start: 2019-02-21 | End: 2019-08-29 | Stop reason: ALTCHOICE

## 2019-02-21 NOTE — TELEPHONE ENCOUNTER
----- Message from Eros Gaspar sent at 2/21/2019  1:33 PM CST -----  Contact: patient  Type:  Patient Returning Call    Who Called:  Patient  Who Left Message for Patient:  Not sure  Does the patient know what this is regarding?:  yes  Best Call Back Number:  [phone]  Additional Information:  Requesting a call back to discuss flu results

## 2019-02-21 NOTE — PROGRESS NOTES
Subjective:       Patient ID: Cristina Cuirel is a 23 y.o. female.    Chief Complaint: Chills    HPI   Ms. Curiel is a 22 yo female who presents today with c/o chills, nausea/vomiting, cough, diarrhea.  This has been going on for about one week.  She has not taken anything for the symptoms.  She is able to drink and keep water down, and able to eat some.  It's more that she feels her appetite  is decreased.  She denies fever and body aches.    Review of Systems   Constitutional: Positive for appetite change and fatigue. Negative for chills and fever.   HENT: Negative for congestion, sinus pressure, sinus pain and sneezing.    Eyes: Negative for pain, discharge and itching.   Respiratory: Positive for cough.    Cardiovascular: Negative for chest pain and palpitations.   Gastrointestinal: Positive for diarrhea, nausea and vomiting.   Musculoskeletal: Negative for myalgias.   Skin: Negative for color change, pallor and rash.   Neurological: Positive for headaches.   Psychiatric/Behavioral: The patient is not nervous/anxious.        Objective:      Physical Exam   Constitutional: She is oriented to person, place, and time. She appears well-developed and well-nourished.   HENT:   Head: Normocephalic and atraumatic.   Eyes: Conjunctivae are normal. Pupils are equal, round, and reactive to light. Right eye exhibits no discharge. Left eye exhibits no discharge.   Neck: Normal range of motion. Neck supple. No thyromegaly present.   Cardiovascular: Normal rate, regular rhythm, normal heart sounds and intact distal pulses.   Pulmonary/Chest: Breath sounds normal. No respiratory distress. She has no wheezes.   Abdominal: Soft. Bowel sounds are normal. She exhibits no distension. There is no tenderness. There is no rebound.   Musculoskeletal: Normal range of motion. She exhibits no edema or deformity.   Lymphadenopathy:     She has no cervical adenopathy.   Neurological: She is alert and oriented to person, place, and time. No  cranial nerve deficit or sensory deficit.   Skin: Skin is warm and dry. No rash noted.   Psychiatric: She has a normal mood and affect.       Assessment:       1. Fatigue, unspecified type    2. Chills    3. Diarrhea, unspecified type    4. Nausea        Plan:       Fatigue, unspecified type  -     Influenza A & B by Molecular        -    Will call with results of the flu swab and plan accordingly   Chills  -     Influenza A & B by Molecular    Diarrhea, unspecified type  -  Increase fluid intake including water, electrolyte solution, and broths  -  When able to eat, begin with small frequent meals of bland diet   -  If symptoms worsen or don't improve over next few days, notify office   Nausea  -Increase fluid intake including water, electroylte solution, and broths  -  When able to eat, begin with small frequent meals of bland diet

## 2019-03-21 ENCOUNTER — TELEPHONE (OUTPATIENT)
Dept: OBSTETRICS AND GYNECOLOGY | Facility: CLINIC | Age: 24
End: 2019-03-21

## 2019-03-21 NOTE — TELEPHONE ENCOUNTER
----- Message from Richard Lubin sent at 3/21/2019 10:38 AM CDT -----  Type:  Patient Call Back    Who Called:  pt  Does the patient know what this is regarding?: pt would like to talk to office; this is the pt's first time coming to the office and has general questions; pt is a former pt of sayda Escobedo Call Back Number:  [phone]  Additional Information:  Please call pt and leave a detailed message if there is no answer.    Call given to Financial Department

## 2019-05-10 ENCOUNTER — HOSPITAL ENCOUNTER (EMERGENCY)
Facility: HOSPITAL | Age: 24
Discharge: HOME | End: 2019-05-10
Attending: EMERGENCY MEDICINE
Payer: COMMERCIAL

## 2019-05-10 VITALS
HEIGHT: 62 IN | WEIGHT: 121.94 LBS | OXYGEN SATURATION: 100 % | BODY MASS INDEX: 22.44 KG/M2 | DIASTOLIC BLOOD PRESSURE: 64 MMHG | TEMPERATURE: 99 F | SYSTOLIC BLOOD PRESSURE: 115 MMHG | RESPIRATION RATE: 14 BRPM | HEART RATE: 59 BPM

## 2019-05-10 DIAGNOSIS — R11.2 NAUSEA AND VOMITING, INTRACTABILITY OF VOMITING NOT SPECIFIED, UNSPECIFIED VOMITING TYPE: Primary | ICD-10-CM

## 2019-05-10 LAB
ALBUMIN SERPL BCP-MCNC: 4.2 G/DL (ref 3.5–5.2)
ALP SERPL-CCNC: 62 U/L (ref 55–135)
ALT SERPL W/O P-5'-P-CCNC: 11 U/L (ref 10–44)
ANION GAP SERPL CALC-SCNC: 8 MMOL/L (ref 8–16)
AST SERPL-CCNC: 14 U/L (ref 10–40)
B-HCG UR QL: NEGATIVE
BASOPHILS # BLD AUTO: 0.1 K/UL (ref 0–0.2)
BASOPHILS NFR BLD: 1.8 % (ref 0–1.9)
BILIRUB SERPL-MCNC: 0.9 MG/DL (ref 0.1–1)
BUN SERPL-MCNC: 6 MG/DL (ref 6–20)
CALCIUM SERPL-MCNC: 9.6 MG/DL (ref 8.7–10.5)
CHLORIDE SERPL-SCNC: 105 MMOL/L (ref 95–110)
CO2 SERPL-SCNC: 26 MMOL/L (ref 23–29)
CREAT SERPL-MCNC: 0.9 MG/DL (ref 0.5–1.4)
CTP QC/QA: YES
DIFFERENTIAL METHOD: ABNORMAL
EOSINOPHIL # BLD AUTO: 0.2 K/UL (ref 0–0.5)
EOSINOPHIL NFR BLD: 3.5 % (ref 0–8)
ERYTHROCYTE [DISTWIDTH] IN BLOOD BY AUTOMATED COUNT: 12.5 % (ref 11.5–14.5)
EST. GFR  (AFRICAN AMERICAN): >60 ML/MIN/1.73 M^2
EST. GFR  (NON AFRICAN AMERICAN): >60 ML/MIN/1.73 M^2
GLUCOSE SERPL-MCNC: 93 MG/DL (ref 70–110)
HCT VFR BLD AUTO: 37.5 % (ref 37–48.5)
HGB BLD-MCNC: 12.1 G/DL (ref 12–16)
LYMPHOCYTES # BLD AUTO: 2.1 K/UL (ref 1–4.8)
LYMPHOCYTES NFR BLD: 44.2 % (ref 18–48)
MCH RBC QN AUTO: 26.9 PG (ref 27–31)
MCHC RBC AUTO-ENTMCNC: 32.3 G/DL (ref 32–36)
MCV RBC AUTO: 83 FL (ref 82–98)
MONOCYTES # BLD AUTO: 0.6 K/UL (ref 0.3–1)
MONOCYTES NFR BLD: 11.6 % (ref 4–15)
NEUTROPHILS # BLD AUTO: 1.9 K/UL (ref 1.8–7.7)
NEUTROPHILS NFR BLD: 38.9 % (ref 38–73)
PLATELET # BLD AUTO: 232 K/UL (ref 150–350)
PMV BLD AUTO: 9.4 FL (ref 9.2–12.9)
POTASSIUM SERPL-SCNC: 3.9 MMOL/L (ref 3.5–5.1)
PROT SERPL-MCNC: 7.7 G/DL (ref 6–8.4)
RBC # BLD AUTO: 4.5 M/UL (ref 4–5.4)
SODIUM SERPL-SCNC: 139 MMOL/L (ref 136–145)
WBC # BLD AUTO: 4.9 K/UL (ref 3.9–12.7)

## 2019-05-10 PROCEDURE — 36415 COLL VENOUS BLD VENIPUNCTURE: CPT

## 2019-05-10 PROCEDURE — 85025 COMPLETE CBC W/AUTO DIFF WBC: CPT

## 2019-05-10 PROCEDURE — 96374 THER/PROPH/DIAG INJ IV PUSH: CPT

## 2019-05-10 PROCEDURE — 81025 URINE PREGNANCY TEST: CPT | Performed by: PHYSICIAN ASSISTANT

## 2019-05-10 PROCEDURE — 80053 COMPREHEN METABOLIC PANEL: CPT

## 2019-05-10 PROCEDURE — 63600175 PHARM REV CODE 636 W HCPCS: Performed by: PHYSICIAN ASSISTANT

## 2019-05-10 PROCEDURE — 25000003 PHARM REV CODE 250: Performed by: PHYSICIAN ASSISTANT

## 2019-05-10 PROCEDURE — 99284 EMERGENCY DEPT VISIT MOD MDM: CPT | Mod: 25

## 2019-05-10 PROCEDURE — 96361 HYDRATE IV INFUSION ADD-ON: CPT

## 2019-05-10 RX ORDER — ONDANSETRON 2 MG/ML
8 INJECTION INTRAMUSCULAR; INTRAVENOUS
Status: COMPLETED | OUTPATIENT
Start: 2019-05-10 | End: 2019-05-10

## 2019-05-10 RX ADMIN — SODIUM CHLORIDE 1000 ML: 0.9 INJECTION, SOLUTION INTRAVENOUS at 10:05

## 2019-05-10 RX ADMIN — ONDANSETRON 8 MG: 2 INJECTION INTRAMUSCULAR; INTRAVENOUS at 10:05

## 2019-05-10 NOTE — ED NOTES
IVF infusing via gravity without incident. Tolerated well. Visiting with family x 1 @ bedside. Stable

## 2019-05-10 NOTE — ED NOTES
NAD - ambulated to  for voided specimen--unaided & without difficulty. Smiling/cooperative. No active retching noted

## 2019-05-10 NOTE — ED PROVIDER NOTES
"Encounter Date: 5/10/2019       History     Chief Complaint   Patient presents with    Nausea     seen at "[facility]" yesterday. Hasn't filled Rx for phenergan     Cristina Curiel is a 23 y.o. Female presenting for evaluation of persistent nausea and vomiting for the last few days.  No abdominal pain.  No fever, no chills.  She had some diarrhea initially, but now feels as if she can't have a bowel movement.  She was evaluated at Urgent Care yesterday and given a RX for Zofran and Phenergan.  She took one dose of Zofran last night and vomited a few hours later.  She decided to come here for further evaluation.      The history is provided by the patient.     Review of patient's allergies indicates:   Allergen Reactions    Zithromax [azithromycin]      History reviewed. No pertinent past medical history.  History reviewed. No pertinent surgical history.  History reviewed. No pertinent family history.  Social History     Tobacco Use    Smoking status: Never Smoker    Smokeless tobacco: Never Used   Substance Use Topics    Alcohol use: Yes     Comment: occas    Drug use: Yes     Types: Marijuana     Review of Systems   Constitutional: Negative for chills and fever.   Respiratory: Negative for cough, chest tightness, shortness of breath and wheezing.    Cardiovascular: Negative for chest pain and palpitations.   Gastrointestinal: Positive for diarrhea, nausea and vomiting. Negative for abdominal pain.   Musculoskeletal: Negative for arthralgias, back pain, joint swelling, myalgias, neck pain and neck stiffness.   Skin: Negative for color change, pallor, rash and wound.   Neurological: Negative for weakness and numbness.   Hematological: Does not bruise/bleed easily.       Physical Exam     Initial Vitals [05/10/19 1015]   BP Pulse Resp Temp SpO2   127/82 62 14 98.6 °F (37 °C) 99 %      MAP       --         Physical Exam    Nursing note and vitals reviewed.  Constitutional: She appears well-developed and " well-nourished. She is not diaphoretic. No distress.   HENT:   Head: Normocephalic and atraumatic.   Mouth/Throat: Oropharynx is clear and moist.   Eyes: Conjunctivae are normal.   Neck: Normal range of motion. Neck supple.   Cardiovascular: Normal rate, regular rhythm, normal heart sounds and intact distal pulses. Exam reveals no gallop and no friction rub.    No murmur heard.  Pulmonary/Chest: Breath sounds normal. No respiratory distress. She has no wheezes. She has no rhonchi. She has no rales.   Abdominal: Soft. She exhibits no distension and no mass. There is no tenderness.   No palpable abdominal tenderness noted.       Musculoskeletal: Normal range of motion. She exhibits no edema or tenderness.   Neurological: She is alert and oriented to person, place, and time. She has normal strength. No sensory deficit.   Skin: Skin is warm and dry. No rash and no abscess noted. No erythema.   Psychiatric: She has a normal mood and affect.         ED Course   Procedures  Labs Reviewed   CBC W/ AUTO DIFFERENTIAL - Abnormal; Notable for the following components:       Result Value    Mean Corpuscular Hemoglobin 26.9 (*)     All other components within normal limits   COMPREHENSIVE METABOLIC PANEL   POCT URINE PREGNANCY          Imaging Results    None          Medical Decision Making:   History:   I obtained history from: someone other than patient.       <> Summary of History: Friend    Old Medical Records: I decided to obtain old medical records.  Differential Diagnosis:   Viral syndrome  SBO  Acute appendicitis  UTI    Clinical Tests:   Lab Tests: Ordered and Reviewed       APC / Resident Notes:   Pt is well appearing.  Labs stable without elevated WBCs, normal renal function.  Normal LFTs.  Low suspicion for acute intraabdominal process.  No need for further imaging or testing at this time.  She will be discharged home to follow-up with her PCP for re-evaluation next week.  She will take the anti-emetics as previously  prescribed by Urgent Care. She voices understanding and is agreeable to the plan.  She is given specific return precautions.                    Clinical Impression:       ICD-10-CM ICD-9-CM   1. Nausea and vomiting, intractability of vomiting not specified, unspecified vomiting type R11.2 787.01                                Priti Box PA-C  05/10/19 1514

## 2019-06-13 ENCOUNTER — PATIENT OUTREACH (OUTPATIENT)
Dept: ADMINISTRATIVE | Facility: HOSPITAL | Age: 24
End: 2019-06-13

## 2019-06-26 ENCOUNTER — DOCUMENTATION ONLY (OUTPATIENT)
Dept: FAMILY MEDICINE | Facility: CLINIC | Age: 24
End: 2019-06-26

## 2019-06-26 NOTE — PROGRESS NOTES
Pre-Visit Chart Review  For Appointment Scheduled on 6/27/19    Health Maintenance Due   Topic Date Due    Pap Smear  12/13/2018

## 2019-08-08 ENCOUNTER — TELEPHONE (OUTPATIENT)
Dept: FAMILY MEDICINE | Facility: CLINIC | Age: 24
End: 2019-08-08

## 2019-08-29 ENCOUNTER — OFFICE VISIT (OUTPATIENT)
Dept: FAMILY MEDICINE | Facility: CLINIC | Age: 24
End: 2019-08-29
Payer: COMMERCIAL

## 2019-08-29 ENCOUNTER — LAB VISIT (OUTPATIENT)
Dept: LAB | Facility: HOSPITAL | Age: 24
End: 2019-08-29
Attending: NURSE PRACTITIONER
Payer: COMMERCIAL

## 2019-08-29 VITALS
HEIGHT: 62 IN | OXYGEN SATURATION: 98 % | DIASTOLIC BLOOD PRESSURE: 66 MMHG | BODY MASS INDEX: 23.13 KG/M2 | WEIGHT: 125.69 LBS | HEART RATE: 76 BPM | TEMPERATURE: 99 F | RESPIRATION RATE: 17 BRPM | SYSTOLIC BLOOD PRESSURE: 120 MMHG

## 2019-08-29 DIAGNOSIS — R53.83 FATIGUE, UNSPECIFIED TYPE: ICD-10-CM

## 2019-08-29 DIAGNOSIS — R10.9 ABDOMINAL CRAMPING: ICD-10-CM

## 2019-08-29 DIAGNOSIS — J02.9 PHARYNGITIS, UNSPECIFIED ETIOLOGY: ICD-10-CM

## 2019-08-29 DIAGNOSIS — T78.1XXA FOOD SENSITIVITY WITH GASTROINTESTINAL SYMPTOMS: ICD-10-CM

## 2019-08-29 DIAGNOSIS — J02.9 PHARYNGITIS, UNSPECIFIED ETIOLOGY: Primary | ICD-10-CM

## 2019-08-29 DIAGNOSIS — R11.0 NAUSEA IN ADULT: ICD-10-CM

## 2019-08-29 LAB
25(OH)D3+25(OH)D2 SERPL-MCNC: 21 NG/ML (ref 30–96)
ALBUMIN SERPL BCP-MCNC: 4.4 G/DL (ref 3.5–5.2)
ALP SERPL-CCNC: 81 U/L (ref 55–135)
ALT SERPL W/O P-5'-P-CCNC: 11 U/L (ref 10–44)
ANION GAP SERPL CALC-SCNC: 13 MMOL/L (ref 8–16)
AST SERPL-CCNC: 15 U/L (ref 10–40)
BASOPHILS # BLD AUTO: 0.04 K/UL (ref 0–0.2)
BASOPHILS NFR BLD: 0.5 % (ref 0–1.9)
BILIRUB SERPL-MCNC: 1.6 MG/DL (ref 0.1–1)
BUN SERPL-MCNC: 8 MG/DL (ref 6–20)
CALCIUM SERPL-MCNC: 9.9 MG/DL (ref 8.7–10.5)
CHLORIDE SERPL-SCNC: 102 MMOL/L (ref 95–110)
CO2 SERPL-SCNC: 23 MMOL/L (ref 23–29)
CREAT SERPL-MCNC: 0.8 MG/DL (ref 0.5–1.4)
CTP QC/QA: YES
DIFFERENTIAL METHOD: ABNORMAL
EOSINOPHIL # BLD AUTO: 0.1 K/UL (ref 0–0.5)
EOSINOPHIL NFR BLD: 0.8 % (ref 0–8)
ERYTHROCYTE [DISTWIDTH] IN BLOOD BY AUTOMATED COUNT: 13 % (ref 11.5–14.5)
EST. GFR  (AFRICAN AMERICAN): >60 ML/MIN/1.73 M^2
EST. GFR  (NON AFRICAN AMERICAN): >60 ML/MIN/1.73 M^2
GLUCOSE SERPL-MCNC: 75 MG/DL (ref 70–110)
HCT VFR BLD AUTO: 39.5 % (ref 37–48.5)
HGB BLD-MCNC: 12.4 G/DL (ref 12–16)
IMM GRANULOCYTES # BLD AUTO: 0.02 K/UL (ref 0–0.04)
IMM GRANULOCYTES NFR BLD AUTO: 0.2 % (ref 0–0.5)
LYMPHOCYTES # BLD AUTO: 2 K/UL (ref 1–4.8)
LYMPHOCYTES NFR BLD: 22.4 % (ref 18–48)
MCH RBC QN AUTO: 27.5 PG (ref 27–31)
MCHC RBC AUTO-ENTMCNC: 31.4 G/DL (ref 32–36)
MCV RBC AUTO: 88 FL (ref 82–98)
MONOCYTES # BLD AUTO: 1.1 K/UL (ref 0.3–1)
MONOCYTES NFR BLD: 12.1 % (ref 4–15)
NEUTROPHILS # BLD AUTO: 5.6 K/UL (ref 1.8–7.7)
NEUTROPHILS NFR BLD: 64 % (ref 38–73)
NRBC BLD-RTO: 0 /100 WBC
PLATELET # BLD AUTO: 224 K/UL (ref 150–350)
PMV BLD AUTO: 11.8 FL (ref 9.2–12.9)
POTASSIUM SERPL-SCNC: 3.8 MMOL/L (ref 3.5–5.1)
PROT SERPL-MCNC: 8.3 G/DL (ref 6–8.4)
RBC # BLD AUTO: 4.51 M/UL (ref 4–5.4)
S PYO RRNA THROAT QL PROBE: NEGATIVE
SODIUM SERPL-SCNC: 138 MMOL/L (ref 136–145)
TSH SERPL DL<=0.005 MIU/L-ACNC: 1.12 UIU/ML (ref 0.4–4)
VIT B12 SERPL-MCNC: 770 PG/ML (ref 210–950)
WBC # BLD AUTO: 8.7 K/UL (ref 3.9–12.7)

## 2019-08-29 PROCEDURE — 87081 CULTURE SCREEN ONLY: CPT

## 2019-08-29 PROCEDURE — 99999 PR PBB SHADOW E&M-EST. PATIENT-LVL IV: CPT | Mod: PBBFAC,,, | Performed by: NURSE PRACTITIONER

## 2019-08-29 PROCEDURE — 82306 VITAMIN D 25 HYDROXY: CPT

## 2019-08-29 PROCEDURE — 99214 OFFICE O/P EST MOD 30 MIN: CPT | Mod: S$GLB,,, | Performed by: NURSE PRACTITIONER

## 2019-08-29 PROCEDURE — 80053 COMPREHEN METABOLIC PANEL: CPT

## 2019-08-29 PROCEDURE — 87880 STREP A ASSAY W/OPTIC: CPT | Mod: QW,S$GLB,, | Performed by: NURSE PRACTITIONER

## 2019-08-29 PROCEDURE — 84443 ASSAY THYROID STIM HORMONE: CPT

## 2019-08-29 PROCEDURE — 99999 PR PBB SHADOW E&M-EST. PATIENT-LVL IV: ICD-10-PCS | Mod: PBBFAC,,, | Performed by: NURSE PRACTITIONER

## 2019-08-29 PROCEDURE — 36415 COLL VENOUS BLD VENIPUNCTURE: CPT | Mod: PO

## 2019-08-29 PROCEDURE — 82607 VITAMIN B-12: CPT

## 2019-08-29 PROCEDURE — 99214 PR OFFICE/OUTPT VISIT, EST, LEVL IV, 30-39 MIN: ICD-10-PCS | Mod: S$GLB,,, | Performed by: NURSE PRACTITIONER

## 2019-08-29 PROCEDURE — 3008F BODY MASS INDEX DOCD: CPT | Mod: CPTII,S$GLB,, | Performed by: NURSE PRACTITIONER

## 2019-08-29 PROCEDURE — 3008F PR BODY MASS INDEX (BMI) DOCUMENTED: ICD-10-PCS | Mod: CPTII,S$GLB,, | Performed by: NURSE PRACTITIONER

## 2019-08-29 PROCEDURE — 85025 COMPLETE CBC W/AUTO DIFF WBC: CPT

## 2019-08-29 PROCEDURE — 87880 POCT RAPID STREP A: ICD-10-PCS | Mod: QW,S$GLB,, | Performed by: NURSE PRACTITIONER

## 2019-08-29 NOTE — PATIENT INSTRUCTIONS
Food Sensitivity  A food sensitivity reaction (food intolerance) occurs if you eat foods that your body can't digest properly. Food intolerance is often confused with a food allergy. Intolerance is from a lack of specific digestive enzymes. However, this is not an allergic reaction. Instead, an ingredient or substance in the food causes an irritation of the digestive system.     Symptoms may start within a few hours after eating. Symptoms include bloating, nausea, vomiting, diarrhea, stomach cramps, or excess gas. This illness usually is over within 6 to 24 hours. Simple home treatment will be helpful. Foods that commonly cause this include:  · Milk and dairy products (cheese, yogurt and sour cream), due to a lactose intolerance (some people who are lactose intolerant may be able to tolerate yogurt and some hard cheeses.)  · MSG (a common additive in Chinese cooking)  · Peas, broccoli, and mushrooms  · Wheat, rye, and barley, due to gluten intolerance  Home care  The following will help you care for yourself at home:  1. If symptoms are severe, rest at home for the next 24 hours.  2. Avoid tobacco and alcohol. These can make symptoms worse.  3. Start with a clear liquid diet, taking small amounts often. Avoid dehydration by drinking 6 to 8 glasses of fluid per day. This can include water, sport drinks, soft drinks without caffeine, juices, tea, soup, etc. Gradually resume a normal diet as you start to feel better.  4. In the future, avoid the foods that caused your reaction. If you are not sure what caused it and you want to find out, cut out all suspected foods. Keeping a food diary may help you figure this out. Add suspected foods back into your diet in small amounts, one at a time, until you have another reaction.  Follow-up care  Follow up with your healthcare provider, or as advised, if you are not improving over the next 2 to 3 days.  Call 911  Call 911 if you have any of these:  · Hard time  breathing  · Confusion  · Drowsiness or trouble awakening  · Fainting or loss of consciousness  · Rapid heart rate  · Vomiting blood, or large amounts of blood in stool  · Seizure  When to seek medical advice  Call your healthcare provider right away if any of these occur:  · Severe constant lower right belly pain  · Vomiting that won't stop (unable to keep liquids down)  · Excessive diarrhea  · Blood or mucus in your diarrhea  · Weakness or dizziness  Date Last Reviewed: 6/1/2016 © 2000-2017 [employer] [address] [place] [address], [place] [address]. All rights reserved. This information is not intended as a substitute for professional medical care. Always follow your healthcare professional's instructions.

## 2019-08-30 ENCOUNTER — TELEPHONE (OUTPATIENT)
Dept: OBSTETRICS AND GYNECOLOGY | Facility: CLINIC | Age: 24
End: 2019-08-30

## 2019-08-30 DIAGNOSIS — R17 ELEVATED BILIRUBIN: Primary | ICD-10-CM

## 2019-08-30 NOTE — TELEPHONE ENCOUNTER
----- Message from Leelee Gonzalez sent at 8/30/2019 12:40 PM CDT -----  Contact: [phone]  Patient is returning nurse's phone call.  Please call patient back at [phone].

## 2019-09-01 LAB — BACTERIA THROAT CULT: NORMAL

## 2019-09-17 ENCOUNTER — LAB VISIT (OUTPATIENT)
Dept: LAB | Facility: HOSPITAL | Age: 24
End: 2019-09-17
Attending: NURSE PRACTITIONER
Payer: COMMERCIAL

## 2019-09-17 ENCOUNTER — OFFICE VISIT (OUTPATIENT)
Dept: OBSTETRICS AND GYNECOLOGY | Facility: CLINIC | Age: 24
End: 2019-09-17
Payer: COMMERCIAL

## 2019-09-17 VITALS
WEIGHT: 124.13 LBS | SYSTOLIC BLOOD PRESSURE: 106 MMHG | HEIGHT: 62 IN | DIASTOLIC BLOOD PRESSURE: 68 MMHG | BODY MASS INDEX: 22.84 KG/M2

## 2019-09-17 DIAGNOSIS — Z11.3 SCREEN FOR STD (SEXUALLY TRANSMITTED DISEASE): ICD-10-CM

## 2019-09-17 DIAGNOSIS — R17 ELEVATED BILIRUBIN: ICD-10-CM

## 2019-09-17 DIAGNOSIS — B37.31 YEAST VAGINITIS: ICD-10-CM

## 2019-09-17 DIAGNOSIS — Z12.4 ENCOUNTER FOR PAP SMEAR OF CERVIX WITH HPV DNA COTESTING: Primary | ICD-10-CM

## 2019-09-17 DIAGNOSIS — Z30.017 ENCOUNTER FOR INITIAL PRESCRIPTION OF IMPLANTABLE SUBDERMAL CONTRACEPTIVE: ICD-10-CM

## 2019-09-17 PROCEDURE — 80076 HEPATIC FUNCTION PANEL: CPT

## 2019-09-17 PROCEDURE — 86592 SYPHILIS TEST NON-TREP QUAL: CPT

## 2019-09-17 PROCEDURE — 88175 CYTOPATH C/V AUTO FLUID REDO: CPT

## 2019-09-17 PROCEDURE — 36415 COLL VENOUS BLD VENIPUNCTURE: CPT | Mod: PO

## 2019-09-17 PROCEDURE — 86703 HIV-1/HIV-2 1 RESULT ANTBDY: CPT

## 2019-09-17 PROCEDURE — 86696 HERPES SIMPLEX TYPE 2 TEST: CPT

## 2019-09-17 PROCEDURE — 3008F PR BODY MASS INDEX (BMI) DOCUMENTED: ICD-10-PCS | Mod: CPTII,S$GLB,, | Performed by: SPECIALIST

## 2019-09-17 PROCEDURE — 86694 HERPES SIMPLEX NES ANTBDY: CPT

## 2019-09-17 PROCEDURE — 99213 PR OFFICE/OUTPT VISIT, EST, LEVL III, 20-29 MIN: ICD-10-PCS | Mod: S$GLB,,, | Performed by: SPECIALIST

## 2019-09-17 PROCEDURE — 99999 PR PBB SHADOW E&M-EST. PATIENT-LVL III: CPT | Mod: PBBFAC,,, | Performed by: SPECIALIST

## 2019-09-17 PROCEDURE — 99213 OFFICE O/P EST LOW 20 MIN: CPT | Mod: S$GLB,,, | Performed by: SPECIALIST

## 2019-09-17 PROCEDURE — 99999 PR PBB SHADOW E&M-EST. PATIENT-LVL III: ICD-10-PCS | Mod: PBBFAC,,, | Performed by: SPECIALIST

## 2019-09-17 PROCEDURE — 87491 CHLMYD TRACH DNA AMP PROBE: CPT

## 2019-09-17 PROCEDURE — 80074 ACUTE HEPATITIS PANEL: CPT

## 2019-09-17 PROCEDURE — 3008F BODY MASS INDEX DOCD: CPT | Mod: CPTII,S$GLB,, | Performed by: SPECIALIST

## 2019-09-17 PROCEDURE — 87624 HPV HI-RISK TYP POOLED RSLT: CPT

## 2019-09-17 RX ORDER — FLUCONAZOLE 200 MG/1
200 TABLET ORAL ONCE
Qty: 1 TABLET | Refills: 0 | Status: SHIPPED | OUTPATIENT
Start: 2019-09-17 | End: 2019-09-17

## 2019-09-17 NOTE — PROGRESS NOTES
22 yo BF presents for requested STD screen  In addition, pt c/o vaginal watery d/c. Denies PP, F/C n/v. Pt also desires Nexplanon ind=sertion.  History reviewed. No pertinent past medical history.    Past Surgical History:   Procedure Laterality Date    ANTERIOR CRUCIATE LIGAMENT REPAIR      HERNIA REPAIR         Family History   Problem Relation Age of Onset    Breast cancer Neg Hx     Ovarian cancer Neg Hx        Social History     Socioeconomic History    Marital status: Single     Spouse name: Not on file    Number of children: Not on file    Years of education: Not on file    Highest education level: Not on file   Occupational History    Not on file   Social Needs    Financial resource strain: Not on file    Food insecurity:     Worry: Not on file     Inability: Not on file    Transportation needs:     Medical: Not on file     Non-medical: Not on file   Tobacco Use    Smoking status: Never Smoker    Smokeless tobacco: Never Used   Substance and Sexual Activity    Alcohol use: Yes     Comment: occas    Drug use: Yes     Types: Marijuana    Sexual activity: Yes     Partners: Female     Birth control/protection: None     Comment: Condom sometimes   Lifestyle    Physical activity:     Days per week: Not on file     Minutes per session: Not on file    Stress: Not on file   Relationships    Social connections:     Talks on phone: Not on file     Gets together: Not on file     Attends Tenriism service: Not on file     Active member of club or organization: Not on file     Attends meetings of clubs or organizations: Not on file     Relationship status: Not on file   Other Topics Concern    Not on file   Social History Narrative    Not on file       Current Outpatient Medications   Medication Sig Dispense Refill    fluconazole (DIFLUCAN) 200 MG Tab Take 1 tablet (200 mg total) by mouth once. for 1 dose 1 tablet 0    ibuprofen (ADVIL,MOTRIN) 600 MG tablet Take 1 tablet (600 mg total) by mouth  every 8 (eight) hours as needed for Pain. 20 tablet 0     Current Facility-Administered Medications   Medication Dose Route Frequency Provider Last Rate Last Dose    medroxyPROGESTERone (DEPO-PROVERA) injection 150 mg  150 mg Intramuscular Q90 Days Fabiana Guerrero NP           Review of patient's allergies indicates:   Allergen Reactions    Zithromax [azithromycin]        Review of System:   General: no chills, fever, night sweats, weight gain or weight loss  Psychological: no depression or suicidal ideation  Breasts: no new or changing breast lumps, nipple discharge or masses.  Respiratory: no cough, shortness of breath, or wheezing  Cardiovascular: no chest pain or dyspnea on exertion  Gastrointestinal: no abdominal pain, change in bowel habits, or black or bloody stools  Genito-Urinary: no incontinence, urinary frequency/urgency or vulvar/vaginal symptoms, pelvic pain or abnormal vaginal bleeding.  Musculoskeletal: no gait disturbance or muscular weakness                                              General Appearance    A and O x 4, Cooperative, no distress   Breasts    Abdomen   Deferred  Soft, non-tender, bowel sounds active all four quadrants,  no masses, no organomegaly    Genitourinary:   External rectal exam shows no thrombosed external hemorrhoids.   Pelvic exam was performed with patient supine.  No labial fusion.  There is no rash, lesion or injury on the right labia.  There is no rash, lesion or injury on the left labia.  No bleeding and no signs of injury around the vaginal introitus, urethra is without lesions and well supported. The cervix is visualized with no discharge, lesions or friability.  Vagina positive white clumping d/c con with yeast  No significant Cystocele, Enterocele or rectocele, and uterus well supported.  Bimanual exam:  The urethra is normal to palpation and there are no palpable vaginal wall masses.  Uterus is not deviated, not enlarged, not fixed, normal shape and not  tender.  Cervix exhibits no motion tenderness.   Right adnexum displays no mass and no tenderness.  Left adnexum displays no mass and no tenderness.   Extremities: Extremities normal, atraumatic, no cyanosis or edema                       Plan Discussed yeast vaginitis and will treat  Will obtain STD panel and follow   Will check Nexplanon benefits and plan insertion  Will follow  At the end of patient visit, nurse and MD both asked pt if any improvements needed in visit experience and asked whether any further pt questions or concerns.

## 2019-09-18 ENCOUNTER — TELEPHONE (OUTPATIENT)
Dept: OBSTETRICS AND GYNECOLOGY | Facility: CLINIC | Age: 24
End: 2019-09-18

## 2019-09-18 LAB
ALBUMIN SERPL BCP-MCNC: 4.6 G/DL (ref 3.5–5.2)
ALP SERPL-CCNC: 69 U/L (ref 55–135)
ALT SERPL W/O P-5'-P-CCNC: 14 U/L (ref 10–44)
AST SERPL-CCNC: 17 U/L (ref 10–40)
BILIRUB DIRECT SERPL-MCNC: 0.5 MG/DL (ref 0.1–0.3)
BILIRUB SERPL-MCNC: 1.2 MG/DL (ref 0.1–1)
C TRACH DNA SPEC QL NAA+PROBE: NOT DETECTED
HAV IGM SERPL QL IA: NEGATIVE
HBV CORE IGM SERPL QL IA: NEGATIVE
HBV SURFACE AG SERPL QL IA: NEGATIVE
HCV AB SERPL QL IA: NEGATIVE
HIV 1+2 AB+HIV1 P24 AG SERPL QL IA: NEGATIVE
N GONORRHOEA DNA SPEC QL NAA+PROBE: NOT DETECTED
PROT SERPL-MCNC: 8.6 G/DL (ref 6–8.4)
RPR SER QL: NORMAL

## 2019-09-18 NOTE — TELEPHONE ENCOUNTER
----- Message from Ting Mendez sent at 9/18/2019  1:53 PM CDT -----  Contact: pt [phone]  Patient called back she states she missed a call from your office she is asking for a call back.

## 2019-09-18 NOTE — TELEPHONE ENCOUNTER
----- Message from Priti Moctezuma sent at 9/18/2019  2:05 PM CDT -----  Contact: patient  Patient returning Charo's call. Please call back at [phone]

## 2019-09-19 LAB — HSV AB, IGM BY EIA: NEGATIVE

## 2019-09-20 ENCOUNTER — TELEPHONE (OUTPATIENT)
Dept: OBSTETRICS AND GYNECOLOGY | Facility: CLINIC | Age: 24
End: 2019-09-20

## 2019-09-20 LAB
HPV HR 12 DNA CVX QL NAA+PROBE: NEGATIVE
HPV16 AG SPEC QL: NEGATIVE
HPV18 DNA SPEC QL NAA+PROBE: NEGATIVE

## 2019-09-20 NOTE — TELEPHONE ENCOUNTER
----- Message from Ricarda Nuñez sent at 9/20/2019 11:52 AM CDT -----  Type:  Test Results    Who Called:  patient  Name of Test (Lab/Mammo/Etc):  Blood labs / culture  Date of Test:  09 17 19  Ordering Provider:  Dr Michael Menon  Where the test was performed:  At [facility] in [place] for culture and blood work was at [facility] in [facility] Call Back Number: [phone]  Additional Information:  Please call patient

## 2019-09-21 LAB
HSV1 IGG SERPL QL IA: NEGATIVE
HSV2 IGG SERPL QL IA: NEGATIVE

## 2019-09-23 ENCOUNTER — TELEPHONE (OUTPATIENT)
Dept: OBSTETRICS AND GYNECOLOGY | Facility: CLINIC | Age: 24
End: 2019-09-23

## 2019-09-23 RX ORDER — TERCONAZOLE 8 MG/G
1 CREAM VAGINAL NIGHTLY
Qty: 20 G | Refills: 0 | Status: SHIPPED | OUTPATIENT
Start: 2019-09-23 | End: 2020-09-04

## 2019-09-23 RX ORDER — FLUCONAZOLE 200 MG/1
TABLET ORAL
COMMUNITY
Start: 2019-09-17 | End: 2020-09-04

## 2019-09-23 NOTE — TELEPHONE ENCOUNTER
Patient took the Diflucan but it made her vomit. She is still having the discharge and itching. Please send in a script for a cream.

## 2019-09-23 NOTE — TELEPHONE ENCOUNTER
----- Message from Kendra Wells sent at 9/23/2019 12:21 PM CDT -----  Type: Needs Medical Advice    Who Called:  Patient   Best Call Back Number: [phone]  Additional Information: patient is requesting a return call regarding medciation    Thank you

## 2019-11-30 ENCOUNTER — HOSPITAL ENCOUNTER (EMERGENCY)
Facility: HOSPITAL | Age: 24
Discharge: HOME | End: 2019-11-30
Attending: EMERGENCY MEDICINE
Payer: COMMERCIAL

## 2019-11-30 VITALS
BODY MASS INDEX: 23 KG/M2 | SYSTOLIC BLOOD PRESSURE: 108 MMHG | DIASTOLIC BLOOD PRESSURE: 57 MMHG | WEIGHT: 125 LBS | HEIGHT: 62 IN | TEMPERATURE: 98 F | RESPIRATION RATE: 17 BRPM | OXYGEN SATURATION: 99 % | HEART RATE: 73 BPM

## 2019-11-30 DIAGNOSIS — R07.9 CHEST PAIN: ICD-10-CM

## 2019-11-30 DIAGNOSIS — N76.0 BACTERIAL VAGINOSIS: ICD-10-CM

## 2019-11-30 DIAGNOSIS — R11.2 NAUSEA AND VOMITING, INTRACTABILITY OF VOMITING NOT SPECIFIED, UNSPECIFIED VOMITING TYPE: ICD-10-CM

## 2019-11-30 DIAGNOSIS — R19.7 ACUTE DIARRHEA: Primary | ICD-10-CM

## 2019-11-30 DIAGNOSIS — B96.89 BACTERIAL VAGINOSIS: ICD-10-CM

## 2019-11-30 LAB
ALBUMIN SERPL BCP-MCNC: 4 G/DL (ref 3.5–5.2)
ALP SERPL-CCNC: 58 U/L (ref 55–135)
ALT SERPL W/O P-5'-P-CCNC: 13 U/L (ref 10–44)
ANION GAP SERPL CALC-SCNC: 8 MMOL/L (ref 8–16)
AST SERPL-CCNC: 18 U/L (ref 10–40)
B-HCG UR QL: NEGATIVE
BACTERIA GENITAL QL WET PREP: ABNORMAL
BASOPHILS # BLD AUTO: 0.03 K/UL (ref 0–0.2)
BASOPHILS NFR BLD: 0.6 % (ref 0–1.9)
BILIRUB SERPL-MCNC: 0.5 MG/DL (ref 0.1–1)
BILIRUB UR QL STRIP: NEGATIVE
BUN SERPL-MCNC: 10 MG/DL (ref 6–20)
CALCIUM SERPL-MCNC: 9.2 MG/DL (ref 8.7–10.5)
CHLORIDE SERPL-SCNC: 106 MMOL/L (ref 95–110)
CLARITY UR: CLEAR
CLUE CELLS VAG QL WET PREP: ABNORMAL
CO2 SERPL-SCNC: 25 MMOL/L (ref 23–29)
COLOR UR: YELLOW
CREAT SERPL-MCNC: 0.8 MG/DL (ref 0.5–1.4)
CTP QC/QA: YES
DIFFERENTIAL METHOD: ABNORMAL
EOSINOPHIL # BLD AUTO: 0.1 K/UL (ref 0–0.5)
EOSINOPHIL NFR BLD: 2.5 % (ref 0–8)
ERYTHROCYTE [DISTWIDTH] IN BLOOD BY AUTOMATED COUNT: 12.4 % (ref 11.5–14.5)
EST. GFR  (AFRICAN AMERICAN): >60 ML/MIN/1.73 M^2
EST. GFR  (NON AFRICAN AMERICAN): >60 ML/MIN/1.73 M^2
FILAMENT FUNGI VAG WET PREP-#/AREA: ABNORMAL
GLUCOSE SERPL-MCNC: 68 MG/DL (ref 70–110)
GLUCOSE UR QL STRIP: NEGATIVE
HCT VFR BLD AUTO: 37.9 % (ref 37–48.5)
HGB BLD-MCNC: 12 G/DL (ref 12–16)
HGB UR QL STRIP: NEGATIVE
IMM GRANULOCYTES # BLD AUTO: 0.01 K/UL (ref 0–0.04)
KETONES UR QL STRIP: NEGATIVE
LEUKOCYTE ESTERASE UR QL STRIP: NEGATIVE
LIPASE SERPL-CCNC: 25 U/L (ref 4–60)
LYMPHOCYTES # BLD AUTO: 2.3 K/UL (ref 1–4.8)
LYMPHOCYTES NFR BLD: 44.8 % (ref 18–48)
MCH RBC QN AUTO: 28 PG (ref 27–31)
MCHC RBC AUTO-ENTMCNC: 31.7 G/DL (ref 32–36)
MCV RBC AUTO: 88 FL (ref 82–98)
MONOCYTES # BLD AUTO: 0.7 K/UL (ref 0.3–1)
MONOCYTES NFR BLD: 14.5 % (ref 4–15)
NEUTROPHILS # BLD AUTO: 1.9 K/UL (ref 1.8–7.7)
NEUTROPHILS NFR BLD: 37.4 % (ref 38–73)
NITRITE UR QL STRIP: NEGATIVE
NRBC BLD-RTO: 0 /100 WBC
PH UR STRIP: 6 [PH] (ref 5–8)
PLATELET # BLD AUTO: 194 K/UL (ref 150–350)
PMV BLD AUTO: 11.1 FL (ref 9.2–12.9)
POTASSIUM SERPL-SCNC: 3.6 MMOL/L (ref 3.5–5.1)
PROT SERPL-MCNC: 7.5 G/DL (ref 6–8.4)
PROT UR QL STRIP: NEGATIVE
RBC # BLD AUTO: 4.29 M/UL (ref 4–5.4)
SODIUM SERPL-SCNC: 139 MMOL/L (ref 136–145)
SP GR UR STRIP: 1.02 (ref 1–1.03)
SPECIMEN SOURCE: ABNORMAL
T VAGINALIS GENITAL QL WET PREP: ABNORMAL
URN SPEC COLLECT METH UR: NORMAL
UROBILINOGEN UR STRIP-ACNC: NEGATIVE EU/DL
WBC # BLD AUTO: 5.11 K/UL (ref 3.9–12.7)
WBC #/AREA VAG WET PREP: ABNORMAL
YEAST GENITAL QL WET PREP: ABNORMAL

## 2019-11-30 PROCEDURE — 85025 COMPLETE CBC W/AUTO DIFF WBC: CPT

## 2019-11-30 PROCEDURE — 25000003 PHARM REV CODE 250: Performed by: EMERGENCY MEDICINE

## 2019-11-30 PROCEDURE — 93005 ELECTROCARDIOGRAM TRACING: CPT

## 2019-11-30 PROCEDURE — 36415 COLL VENOUS BLD VENIPUNCTURE: CPT

## 2019-11-30 PROCEDURE — 87491 CHLMYD TRACH DNA AMP PROBE: CPT

## 2019-11-30 PROCEDURE — 96361 HYDRATE IV INFUSION ADD-ON: CPT

## 2019-11-30 PROCEDURE — 80053 COMPREHEN METABOLIC PANEL: CPT

## 2019-11-30 PROCEDURE — 87210 SMEAR WET MOUNT SALINE/INK: CPT

## 2019-11-30 PROCEDURE — 81003 URINALYSIS AUTO W/O SCOPE: CPT

## 2019-11-30 PROCEDURE — 63600175 PHARM REV CODE 636 W HCPCS: Performed by: EMERGENCY MEDICINE

## 2019-11-30 PROCEDURE — 96374 THER/PROPH/DIAG INJ IV PUSH: CPT

## 2019-11-30 PROCEDURE — 81025 URINE PREGNANCY TEST: CPT | Performed by: EMERGENCY MEDICINE

## 2019-11-30 PROCEDURE — 99284 EMERGENCY DEPT VISIT MOD MDM: CPT | Mod: 25

## 2019-11-30 PROCEDURE — 83690 ASSAY OF LIPASE: CPT

## 2019-11-30 RX ORDER — METRONIDAZOLE 500 MG/1
500 TABLET ORAL EVERY 12 HOURS
Qty: 14 TABLET | Refills: 0 | Status: SHIPPED | OUTPATIENT
Start: 2019-11-30 | End: 2019-12-07

## 2019-11-30 RX ORDER — DICYCLOMINE HYDROCHLORIDE 10 MG/1
20 CAPSULE ORAL
Status: COMPLETED | OUTPATIENT
Start: 2019-11-30 | End: 2019-11-30

## 2019-11-30 RX ORDER — ONDANSETRON 2 MG/ML
4 INJECTION INTRAMUSCULAR; INTRAVENOUS
Status: COMPLETED | OUTPATIENT
Start: 2019-11-30 | End: 2019-11-30

## 2019-11-30 RX ORDER — ONDANSETRON 4 MG/1
4 TABLET, FILM COATED ORAL EVERY 6 HOURS
Qty: 12 TABLET | Refills: 0 | Status: SHIPPED | OUTPATIENT
Start: 2019-11-30 | End: 2020-09-04

## 2019-11-30 RX ORDER — DICYCLOMINE HYDROCHLORIDE 20 MG/1
20 TABLET ORAL 2 TIMES DAILY
Qty: 20 TABLET | Refills: 0 | Status: SHIPPED | OUTPATIENT
Start: 2019-11-30 | End: 2019-12-30

## 2019-11-30 RX ADMIN — DICYCLOMINE HYDROCHLORIDE 20 MG: 10 CAPSULE ORAL at 01:11

## 2019-11-30 RX ADMIN — ONDANSETRON 4 MG: 2 INJECTION INTRAMUSCULAR; INTRAVENOUS at 01:11

## 2019-11-30 RX ADMIN — SODIUM CHLORIDE 1000 ML: 0.9 INJECTION, SOLUTION INTRAVENOUS at 01:11

## 2019-11-30 NOTE — ED PROVIDER NOTES
Encounter Date: 11/30/2019    SCRIBE #1 NOTE: I, Pascale Choi, am scribing for, and in the presence of, Dr. Eron Zamora.       History     Chief Complaint   Patient presents with    Abdominal Pain     abdominal pain since tuesday worsening today    also complains of nausea and diarrhea        Time seen by provider: 1:15 AM on 11/30/2019    Cristina Curiel is a 23 y.o. female who presents the ED with complaints of decreased appetite since x2 days ago and abdominal pain since x1 day ago. The patient reports having 3 episodes of diarrhea without blood yesterday and 1 episode of vomiting. She reports taking pepto bismal today without relief. The patient mentions a light yellow abnormal discharge. The patient denies any recent unprotected encounters, urinary symptoms, history of UTI, or any other symptoms at this time. Zithromax allergy noted.    The history is provided by the patient.     Review of patient's allergies indicates:   Allergen Reactions    Zithromax [azithromycin]      No past medical history on file.  Past Surgical History:   Procedure Laterality Date    ANTERIOR CRUCIATE LIGAMENT REPAIR      HERNIA REPAIR       Family History   Problem Relation Age of Onset    Breast cancer Neg Hx     Ovarian cancer Neg Hx      Social History     Tobacco Use    Smoking status: Never Smoker    Smokeless tobacco: Never Used   Substance Use Topics    Alcohol use: Yes     Comment: occas    Drug use: Yes     Types: Marijuana     Review of Systems   Constitutional: Positive for appetite change (decreased). Negative for fever.   HENT: Negative for congestion.    Eyes: Negative for visual disturbance.   Respiratory: Negative for wheezing.    Cardiovascular: Negative for chest pain.   Gastrointestinal: Positive for abdominal pain, diarrhea, nausea and vomiting.   Genitourinary: Positive for vaginal discharge. Negative for difficulty urinating, dysuria and frequency.   Musculoskeletal: Negative for joint swelling.   Skin:  Negative for rash.   Neurological: Negative for syncope.   Hematological: Does not bruise/bleed easily.   Psychiatric/Behavioral: Negative for confusion.       Physical Exam     Initial Vitals [11/30/19 0017]   BP Pulse Resp Temp SpO2   (!) 108/57 73 17 98.3 °F (36.8 °C) 99 %      MAP       --         Physical Exam    Nursing note and vitals reviewed.  Constitutional: She appears well-nourished.   HENT:   Head: Normocephalic and atraumatic.   Eyes: Conjunctivae and EOM are normal.   Neck: Normal range of motion. Neck supple. No thyroid mass present.   Cardiovascular: Normal rate, regular rhythm and normal heart sounds. Exam reveals no gallop and no friction rub.    No murmur heard.  Pulmonary/Chest: Breath sounds normal. She has no wheezes. She has no rhonchi. She has no rales.   Equal, bilateral breath sounds noted without wheezing.   Abdominal: Soft. Normal appearance and bowel sounds are normal. There is no tenderness.   No palpable abdominal tenderness noted.     Genitourinary:   Genitourinary Comments: No scant. Clear vaginal discharge. No CMT. Normal external findings.   Neurological: She is alert and oriented to person, place, and time. She has normal strength. No cranial nerve deficit or sensory deficit.   Skin: Skin is warm and dry. No rash noted. No erythema.   Psychiatric: She has a normal mood and affect. Her speech is normal. Cognition and memory are normal.         ED Course   Procedures  Labs Reviewed   CBC W/ AUTO DIFFERENTIAL - Abnormal; Notable for the following components:       Result Value    Mean Corpuscular Hemoglobin Conc 31.7 (*)     Gran% 37.4 (*)     All other components within normal limits   COMPREHENSIVE METABOLIC PANEL - Abnormal; Notable for the following components:    Glucose 68 (*)     All other components within normal limits   VAGINAL SCREEN - Abnormal; Notable for the following components:    Clue Cells Moderate (*)     WBC - Vaginal Screen Few (*)     Bacteria - Vaginal Screen  Moderate (*)     All other components within normal limits   C. TRACHOMATIS/N. GONORRHOEAE BY AMP DNA   LIPASE   URINALYSIS, REFLEX TO URINE CULTURE    Narrative:     Preferred Collection Type->Urine, Clean Catch   POCT URINE PREGNANCY     EKG Readings: (Independently Interpreted)   Initial Reading: No STEMI. Heart Rate: 53 bpm.   Sinus bradycardia with sinus arrhythmia. Normal axis. Normal intervals.       Imaging Results    None          Medical Decision Making:   History:   Old Medical Records: I decided to obtain old medical records.  Independently Interpreted Test(s):   I have ordered and independently interpreted EKG Reading(s) - see prior notes  Clinical Tests:   Lab Tests: Ordered and Reviewed  Medical Tests: Reviewed and Ordered  ED Management:  Pt interviewed and assessed emergently. VSS. Abd exam is soft and nonsurgical. Pt reported resolution of symptoms with symptomatic treatment and hydration. Labs neg for evidence of progressing infection/inflamm, end organ dysfunction, dehydration, electrolyte abnormality. Chaperoned pelvic exam reveals scant VD. No CMT. Clue cells noted on wet prep. Pt educated about flagyl therapy for BV. Low suspicion for life threatening path including append., torsion, PID/TOA, obstruction/perf, pancreatitis, cholecyst. Susp fooborne or viral gastroenteritic process. She is educated about supportive care for NVD and will be dc'd with medication for symptoms improvement. Asked to f/u with OBGYN or PCP services asap. She is asked to return to the ED for any new, worsening or concerning symptoms. Pt agreeable and dc'd in stable condition.            Scribe Attestation:   Scribe #1: I performed the above scribed service and the documentation accurately describes the services I performed. I attest to the accuracy of the note.    I, Dr. Eron Zamora, personally performed the services described in this documentation. All medical record entries made by the scribe were at my direction  and in my presence.  I have reviewed the chart and agree that the record reflects my personal performance and is accurate and complete. Eron Zamora MD.  8:40 AM 11/30/2019                        Clinical Impression:       ICD-10-CM ICD-9-CM   1. Acute diarrhea R19.7 787.91   2. Chest pain R07.9 786.50   3. Nausea and vomiting, intractability of vomiting not specified, unspecified vomiting type R11.2 787.01   4. Bacterial vaginosis N76.0 616.10    B96.89 041.9         Disposition:   Disposition: Discharged  Condition: Stable                     Eron Zamora MD  11/30/19 0841

## 2019-12-01 LAB
C TRACH DNA SPEC QL NAA+PROBE: NOT DETECTED
N GONORRHOEA DNA SPEC QL NAA+PROBE: NOT DETECTED

## 2020-01-23 ENCOUNTER — TELEPHONE (OUTPATIENT)
Dept: OBSTETRICS AND GYNECOLOGY | Facility: CLINIC | Age: 25
End: 2020-01-23

## 2020-01-23 NOTE — TELEPHONE ENCOUNTER
----- Message from Jhoan Galvin sent at 1/23/2020 12:58 PM CST -----  Contact: pt  Type: Needs Medical Advice    Who Called:  pt    Best Call Back Number: [phone]  Additional Information: Would like to speak to a nurse in the office. Would not give any further information. Please call to advise.

## 2020-01-30 ENCOUNTER — TELEPHONE (OUTPATIENT)
Dept: OBSTETRICS AND GYNECOLOGY | Facility: CLINIC | Age: 25
End: 2020-01-30

## 2020-01-30 ENCOUNTER — OFFICE VISIT (OUTPATIENT)
Dept: OBSTETRICS AND GYNECOLOGY | Facility: CLINIC | Age: 25
End: 2020-01-30
Payer: COMMERCIAL

## 2020-01-30 VITALS
BODY MASS INDEX: 22.31 KG/M2 | HEIGHT: 62 IN | SYSTOLIC BLOOD PRESSURE: 108 MMHG | WEIGHT: 121.25 LBS | DIASTOLIC BLOOD PRESSURE: 78 MMHG

## 2020-01-30 DIAGNOSIS — Z11.3 SCREEN FOR STD (SEXUALLY TRANSMITTED DISEASE): ICD-10-CM

## 2020-01-30 DIAGNOSIS — Z01.812 PRE-PROCEDURE LAB EXAM: Primary | ICD-10-CM

## 2020-01-30 DIAGNOSIS — Z30.017 NEXPLANON INSERTION: ICD-10-CM

## 2020-01-30 LAB
B-HCG UR QL: NEGATIVE
C TRACH DNA SPEC QL NAA+PROBE: NOT DETECTED
CTP QC/QA: YES
N GONORRHOEA DNA SPEC QL NAA+PROBE: NOT DETECTED

## 2020-01-30 PROCEDURE — 11981 PR INSERT, DRUG DELIVERY IMPLANT, BIORESORB/BIODEGR/NON-BIODEGR: ICD-10-PCS | Mod: S$GLB,,, | Performed by: SPECIALIST

## 2020-01-30 PROCEDURE — 99999 PR PBB SHADOW E&M-EST. PATIENT-LVL III: CPT | Mod: PBBFAC,,, | Performed by: SPECIALIST

## 2020-01-30 PROCEDURE — 81025 URINE PREGNANCY TEST: CPT | Mod: S$GLB,,, | Performed by: SPECIALIST

## 2020-01-30 PROCEDURE — 99213 PR OFFICE/OUTPT VISIT, EST, LEVL III, 20-29 MIN: ICD-10-PCS | Mod: 25,S$GLB,, | Performed by: SPECIALIST

## 2020-01-30 PROCEDURE — 11981 INSERTION DRUG DLVR IMPLANT: CPT | Mod: S$GLB,,, | Performed by: SPECIALIST

## 2020-01-30 PROCEDURE — 87491 CHLMYD TRACH DNA AMP PROBE: CPT

## 2020-01-30 PROCEDURE — 3008F BODY MASS INDEX DOCD: CPT | Mod: CPTII,S$GLB,, | Performed by: SPECIALIST

## 2020-01-30 PROCEDURE — 81025 POCT URINE PREGNANCY: ICD-10-PCS | Mod: S$GLB,,, | Performed by: SPECIALIST

## 2020-01-30 PROCEDURE — 99999 PR PBB SHADOW E&M-EST. PATIENT-LVL III: ICD-10-PCS | Mod: PBBFAC,,, | Performed by: SPECIALIST

## 2020-01-30 PROCEDURE — 99213 OFFICE O/P EST LOW 20 MIN: CPT | Mod: 25,S$GLB,, | Performed by: SPECIALIST

## 2020-01-30 PROCEDURE — 3008F PR BODY MASS INDEX (BMI) DOCUMENTED: ICD-10-PCS | Mod: CPTII,S$GLB,, | Performed by: SPECIALIST

## 2020-01-30 NOTE — PROCEDURES
Procedures     Procedure  Nexplanon Placement    After proper informed conscent, nondominant upper inner arm prepped with Betadine solution. 1% Lidocaine administered sub-Q along placement path.  Nexplanon delivery needle placed under skin, advanced to hub base and delivery lever depressed with implant delivered without difficulty.  Implant palpated under skin and compression bandage applied. Tolerated well.

## 2020-01-30 NOTE — PROGRESS NOTES
23 yo BF presents for STD screening as well as Nexplanon insertion. No overt gyn complaints. UPT neg  History reviewed. No pertinent past medical history.    Past Surgical History:   Procedure Laterality Date    ANTERIOR CRUCIATE LIGAMENT REPAIR      HERNIA REPAIR         Family History   Problem Relation Age of Onset    Breast cancer Neg Hx     Ovarian cancer Neg Hx        Social History     Socioeconomic History    Marital status: Single     Spouse name: Not on file    Number of children: Not on file    Years of education: Not on file    Highest education level: Not on file   Occupational History    Not on file   Social Needs    Financial resource strain: Not on file    Food insecurity:     Worry: Not on file     Inability: Not on file    Transportation needs:     Medical: Not on file     Non-medical: Not on file   Tobacco Use    Smoking status: Never Smoker    Smokeless tobacco: Never Used   Substance and Sexual Activity    Alcohol use: Yes     Comment: occas    Drug use: Yes     Types: Marijuana    Sexual activity: Yes     Partners: Female     Birth control/protection: None     Comment: Condom sometimes   Lifestyle    Physical activity:     Days per week: Not on file     Minutes per session: Not on file    Stress: Not on file   Relationships    Social connections:     Talks on phone: Not on file     Gets together: Not on file     Attends [employer]ism service: Not on file     Active member of club or organization: Not on file     Attends meetings of clubs or organizations: Not on file     Relationship status: Not on file   Other Topics Concern    Not on file   Social History Narrative    Not on file       Current Outpatient Medications   Medication Sig Dispense Refill    fluconazole (DIFLUCAN) 200 MG Tab       ibuprofen (ADVIL,MOTRIN) 600 MG tablet Take 1 tablet (600 mg total) by mouth every 8 (eight) hours as needed for Pain. (Patient not taking: Reported on 1/30/2020) 20 tablet 0     ondansetron (ZOFRAN) 4 MG tablet Take 1 tablet (4 mg total) by mouth every 6 (six) hours. (Patient not taking: Reported on 1/30/2020) 12 tablet 0    terconazole (TERAZOL 3) 0.8 % vaginal cream Place 1 applicator vaginally every evening. (Patient not taking: Reported on 1/30/2020) 20 g 0     Current Facility-Administered Medications   Medication Dose Route Frequency Provider Last Rate Last Dose    medroxyPROGESTERone (DEPO-PROVERA) injection 150 mg  150 mg Intramuscular Q90 Days Fabiana Guerrero NP           Review of patient's allergies indicates:   Allergen Reactions    Zithromax [azithromycin]        Review of System:   General: no chills, fever, night sweats, weight gain or weight loss  Psychological: no depression or suicidal ideation  Breasts: no new or changing breast lumps, nipple discharge or masses.  Respiratory: no cough, shortness of breath, or wheezing  Cardiovascular: no chest pain or dyspnea on exertion  Gastrointestinal: no abdominal pain, change in bowel habits, or black or bloody stools  Genito-Urinary: no incontinence, urinary frequency/urgency or vulvar/vaginal symptoms, pelvic pain or abnormal vaginal bleeding.  Musculoskeletal: no gait disturbance or muscular weakness                                              General Appearance    A and O x 4, Cooperative, no distress   Breasts    Abdomen   Deferred  Soft, non-tender, bowel sounds active all four quadrants,  no masses, no organomegaly    Genitourinary:   External rectal exam shows no thrombosed external hemorrhoids.   Pelvic exam was performed with patient supine.  No labial fusion.  There is no rash, lesion or injury on the right labia.  There is no rash, lesion or injury on the left labia.  No bleeding and no signs of injury around the vaginal introitus, urethra is without lesions and well supported. The cervix is visualized with no discharge, lesions or friability.  No vaginal discharge found.  No significant Cystocele, Enterocele or  rectocele, and uterus well supported.  Bimanual exam:  The urethra is normal to palpation and there are no palpable vaginal wall masses.  Uterus is not deviated, not enlarged, not fixed, normal shape and not tender.  Cervix exhibits no motion tenderness.   Right adnexum displays no mass and no tenderness.  Left adnexum displays no mass and no tenderness.   Extremities: Extremities normal, atraumatic, no cyanosis or edema                       GC CZ submitted    Procedure  Nexplanon Placement    After proper informed conscent, nondominant upper inner arm prepped with Betadine solution. 1% Lidocaine administered sub-Q along placement path.  Nexplanon delivery needle placed under skin, advanced to hub base and delivery lever depressed with implant delivered without difficulty.  Implant palpated under skin and compression bandage applied. Tolerated well.      Plan Will follow results   Nexplanon card given   RTO 1 year/prn

## 2020-01-30 NOTE — TELEPHONE ENCOUNTER
----- Message from Ella Fonseca sent at 1/30/2020  2:40 PM CST -----  Contact: patient  Type: Needs Medical Advice    Who Called:  Patient  Best Call Back Number: [phone] (home)   Additional Information: the pt said her arm is bruised and she was told it would not bruised she would like a nurse to call her back to advise asap

## 2020-01-30 NOTE — TELEPHONE ENCOUNTER
Patient had Nexplanon inserted today informed not uncommon to bruise. Advised to monitor if increased swelling, bruising, pain or warm to touch contact office verbalized understanding

## 2020-02-04 ENCOUNTER — TELEPHONE (OUTPATIENT)
Dept: OBSTETRICS AND GYNECOLOGY | Facility: CLINIC | Age: 25
End: 2020-02-04

## 2020-02-04 NOTE — TELEPHONE ENCOUNTER
----- Message from Jona Marcelino sent at 2/4/2020 10:39 AM CST -----  Contact: Self  Pt calling in regards to a call back from nurse in office for her test results     Please advise pt can be contact at [phone]

## 2020-09-04 ENCOUNTER — TELEPHONE (OUTPATIENT)
Dept: FAMILY MEDICINE | Facility: CLINIC | Age: 25
End: 2020-09-04

## 2020-09-04 ENCOUNTER — HOSPITAL ENCOUNTER (OUTPATIENT)
Dept: RADIOLOGY | Facility: CLINIC | Age: 25
Discharge: HOME | End: 2020-09-04
Attending: NURSE PRACTITIONER
Payer: COMMERCIAL

## 2020-09-04 ENCOUNTER — OFFICE VISIT (OUTPATIENT)
Dept: FAMILY MEDICINE | Facility: CLINIC | Age: 25
End: 2020-09-04
Payer: COMMERCIAL

## 2020-09-04 VITALS
HEIGHT: 62 IN | OXYGEN SATURATION: 99 % | DIASTOLIC BLOOD PRESSURE: 78 MMHG | HEART RATE: 62 BPM | TEMPERATURE: 98 F | WEIGHT: 130.5 LBS | BODY MASS INDEX: 24.01 KG/M2 | SYSTOLIC BLOOD PRESSURE: 110 MMHG

## 2020-09-04 DIAGNOSIS — K59.00 CONSTIPATION, UNSPECIFIED CONSTIPATION TYPE: Primary | ICD-10-CM

## 2020-09-04 DIAGNOSIS — R10.32 LLQ PAIN: ICD-10-CM

## 2020-09-04 DIAGNOSIS — K59.00 CONSTIPATION, UNSPECIFIED CONSTIPATION TYPE: ICD-10-CM

## 2020-09-04 PROCEDURE — 74019 RADEX ABDOMEN 2 VIEWS: CPT | Mod: TC,FY,PO

## 2020-09-04 PROCEDURE — 74019 XR ABDOMEN FLAT AND ERECT: ICD-10-PCS | Mod: 26,,, | Performed by: RADIOLOGY

## 2020-09-04 PROCEDURE — 99999 PR PBB SHADOW E&M-EST. PATIENT-LVL IV: ICD-10-PCS | Mod: PBBFAC,,, | Performed by: NURSE PRACTITIONER

## 2020-09-04 PROCEDURE — 99214 OFFICE O/P EST MOD 30 MIN: CPT | Mod: S$GLB,,, | Performed by: NURSE PRACTITIONER

## 2020-09-04 PROCEDURE — 99214 PR OFFICE/OUTPT VISIT, EST, LEVL IV, 30-39 MIN: ICD-10-PCS | Mod: S$GLB,,, | Performed by: NURSE PRACTITIONER

## 2020-09-04 PROCEDURE — 99999 PR PBB SHADOW E&M-EST. PATIENT-LVL IV: CPT | Mod: PBBFAC,,, | Performed by: NURSE PRACTITIONER

## 2020-09-04 PROCEDURE — 74019 RADEX ABDOMEN 2 VIEWS: CPT | Mod: 26,,, | Performed by: RADIOLOGY

## 2020-09-04 RX ORDER — BISACODYL 5 MG
5 TABLET, DELAYED RELEASE (ENTERIC COATED) ORAL DAILY PRN
Refills: 0 | COMMUNITY
Start: 2020-09-04 | End: 2021-01-02 | Stop reason: CLARIF

## 2020-09-04 NOTE — TELEPHONE ENCOUNTER
----- Message from Melvina Rivera NP sent at 9/4/2020 11:05 AM CDT -----  I reviewed your the xray of the abdomen. The results were normal. Due to the pebble like stools, I recommend you take the dulcolax as needed, increase your fluid intake, and add fiber to your diet.

## 2020-09-04 NOTE — PATIENT INSTRUCTIONS
Constipation (Adult)  Constipation means that you have bowel movements that are less frequent than usual. Stools often become very hard and difficult to pass.  Constipation is very common. At some point in life it affects almost everyone. Since everyone's bowel habits are different, what is constipation to one person may not be to another. Your healthcare provider may do tests to diagnose constipation. It depends on what he or she finds when evaluating you.    Symptoms of constipation include:  · Abdominal pain  · Bloating  · Vomiting  · Painful bowel movements  · Itching, swelling, bleeding, or pain around the anus  Causes  Constipation can have many causes. These include:  · Diet low in fiber  · Too much dairy  · Not drinking enough liquids  · Lack of exercise or physical activity. This is especially true for older adults.  · Changes in lifestyle or daily routine, including pregnancy, aging, work, and travel  · Frequent use or misuse of laxatives  · Ignoring the urge to have a bowel movement or delaying it until later  · Medicines, such as certain prescription pain medicines, iron supplements, antacids, certain antidepressants, and calcium supplements  · Diseases like irritable bowel syndrome, bowel obstructions, stroke, diabetes, thyroid disease, Parkinson disease, hemorrhoids, and colon cancer  Complications  Potential complications of constipation can include:  · Hemorrhoids  · Rectal bleeding from hemorrhoids or anal fissures (skin tears)  · Hernias  · Dependency on laxatives  · Chronic constipation  · Fecal impaction  · Bowel obstruction or perforation  Home care  All treatment should be done after talking with your healthcare provider. This is especially true if you have another medical problems, are taking prescription medicines, or are an older adult. Treatment most often involves lifestyle changes. You may also need medicines. Your healthcare provider will tell you which will work best for you. Follow  the advice below to help avoid this problem in the future.  Lifestyle changes  These lifestyle changes can help prevent constipation:  · Diet. Eat a high-fiber diet, with fresh fruit and vegetables, and reduce dairy intake, meats, and processed foods  · Fluids. It's important to get enough fluids each day. Drink plenty of water when you eat more fiber. If you are on diet that limits the amount of fluid you can have, talk about this with your healthcare provider.  · Regular exercise. Check with your healthcare provider first.  Medications  Take any medicines as directed. Some laxatives are safe to use only every now and then. Others can be taken on a regular basis. Talk with your doctor or pharmacist if you have questions.  Prescription pain medicines can cause constipation. If you are taking this kind of medicine, ask your healthcare provider if you should also take a stool softener.  Medicines you may take to treat constipation include:  · Fiber supplements  · Stool softeners  · Laxatives  · Enemas  · Rectal suppositories  Follow-up care  Follow up with your healthcare provider if symptoms don't get better in the next few days. You may need to have more tests or see a specialist.  Call 911  Call 911 if any of these occur:  · Trouble breathing  · Stiff, rigid abdomen that is severely painful to touch  · Confusion  · Fainting or loss of consciousness  · Rapid heart rate  · Chest pain  When to seek medical advice  Call your healthcare provider right away if any of these occur:  · Fever over 100.4°F (38°C)  · Failure to resume normal bowel movements  · Pain in your abdomen or back gets worse  · Nausea or vomiting  · Swelling in your abdomen  · Blood in the stool  · Black, tarry stool  · Involuntary weight loss  · Weakness  Date Last Reviewed: 12/30/2015  © 4122-5919 [employer]. [address] [place] [address], [place] [address]. All rights reserved. This information is not intended as a substitute for  professional medical care. Always follow your healthcare professional's instructions.        Eating a High-Fiber Diet  Fiber is what gives strength and structure to plants. Most grains, beans, vegetables, and fruits contain fiber. Foods rich in fiber are often low in calories and fat, and they fill you up more. They may also reduce your risks for certain health problems. To find out the amount of fiber in canned, packaged, or frozen foods, read the Nutrition Facts label. It tells you how much fiber is in a serving.    Types of fiber and their benefits  There are two types of fiber: insoluble and soluble. They both aid digestion and help you maintain a healthy weight.  · Insoluble fiber. This is found in whole grains, cereals, certain fruits and vegetables such as apple skin, corn, and carrots. Insoluble fiber may prevent constipation and reduce the risk for certain types of cancer.  · Soluble fiber. This type of fiber is in oats, beans, and certain fruits and vegetables such as strawberries and peas. Soluble fiber can reduce cholesterol, which may help lower the risk for heart disease. It also helps control blood sugar levels.  Look for high-fiber foods  Try these foods to add fiber to your diet:  · Whole-grain breads and cereals. Try to eat 6 to 8 ounces a day. Include wheat and oat bran cereals, whole-wheat muffins or toast, and corn tortillas in your meals.  · Fruits. Try to eat 2 cups a day. Apples, oranges, strawberries, pears, and bananas are good sources. (Note: Fruit juice is low in fiber.)  · Vegetables. Try to eat at least 2.5 cups a day. Add asparagus, carrots, broccoli, peas, and corn to your meals.  · Beans. One cup of cooked lentils gives you over 15 grams of fiber. Try navy beans, lentils, and chickpeas.  · Seeds. A small handful of seeds gives you about 3 grams of fiber. Try sunflower seeds.  Keep track of your fiber  Keep track of how much fiber you eat. Start by reading food labels. Then eat a  variety of foods high in fiber. As you begin to eat more fiber, ask your healthcare provider how much water you should be drinking to keep your digestive system working smoothly.  You should aim for a certain amount of fiber in your diet each day. If you are a woman, that amount is between 25 and 28 grams per day. Men should aim for 30 to 33 grams per day. After age 50, your daily fiber needs drop to 22 grams for women and 28 grams for men.  Before you reach for the fiber supplements, think about this. Fiber is found naturally in healthy whole foods. It gives you that feeling of fullness after you eat. Taking fiber supplements or eating fiber-enriched foods will not give you this full feeling.  Your fiber intake is a good measure for the quality of your overall diet. If you are missing out on your daily amount of fiber, you may be lacking other important nutrients as well.  Date Last Reviewed: 5/11/2015  © 1543-1191 [employer]. [address] [place] [address], [place] [address]. All rights reserved. This information is not intended as a substitute for professional medical care. Always follow your healthcare professional's instructions.

## 2020-09-04 NOTE — PROGRESS NOTES
"Subjective:       Patient ID: Cristina Curiel is a 24 y.o. female.    Chief Complaint: GI Problem    GI Problem  Primary symptoms do not include fever, weight loss, fatigue, abdominal pain, nausea, vomiting, diarrhea, melena, hematemesis, jaundice, hematochezia, dysuria, myalgias, arthralgias or rash. The illness began more than 7 days ago. The onset was gradual. The problem has been gradually worsening.   The illness is also significant for bloating and constipation. The illness does not include chills, anorexia, dysphagia, odynophagia, tenesmus, back pain or itching. Associated medical issues do not include inflammatory bowel disease, GERD, gallstones, liver disease, alcohol abuse, PUD, gastric bypass, bowel resection, irritable bowel syndrome, hemorrhoids or diverticulitis.     Vitals:    09/04/20 0858   BP: 110/78   Pulse: 62   Temp: 97.7 °F (36.5 °C)   TempSrc: Skin   SpO2: 99%   Weight: 59.2 kg (130 lb 8.2 oz)   Height: 5' 2" (1.575 m)   PainSc: 0-No pain     Body mass index is 23.87 kg/m².    History reviewed. No pertinent past medical history.  Past Surgical History:   Procedure Laterality Date    ANTERIOR CRUCIATE LIGAMENT REPAIR      HERNIA REPAIR       Social History     Socioeconomic History    Marital status: Single     Spouse name: Not on file    Number of children: Not on file    Years of education: Not on file    Highest education level: Not on file   Occupational History    Not on file   Social Needs    Financial resource strain: Not on file    Food insecurity     Worry: Not on file     Inability: Not on file    Transportation needs     Medical: Not on file     Non-medical: Not on file   Tobacco Use    Smoking status: Never Smoker    Smokeless tobacco: Never Used   Substance and Sexual Activity    Alcohol use: Yes     Comment: occas    Drug use: Yes     Types: Marijuana    Sexual activity: Yes     Partners: Female     Birth control/protection: None     Comment: Condom sometimes "   Lifestyle    Physical activity     Days per week: Not on file     Minutes per session: Not on file    Stress: Not on file   Relationships    Social connections     Talks on phone: Not on file     Gets together: Not on file     Attends Pentecostal service: Not on file     Active member of club or organization: Not on file     Attends meetings of clubs or organizations: Not on file     Relationship status: Not on file   Other Topics Concern    Not on file   Social History Narrative    Not on file       Review of patient's allergies indicates:   Allergen Reactions    Zithromax [azithromycin]        Current Outpatient Medications:     bisacodyL (DULCOLAX) 5 mg EC tablet, Take 1 tablet (5 mg total) by mouth daily as needed for Constipation., Disp:  , Rfl: 0    Current Facility-Administered Medications:     medroxyPROGESTERone (DEPO-PROVERA) injection 150 mg, 150 mg, Intramuscular, Q90 Days, Fabiana Guerrero NP    Review of Systems   Constitutional: Negative for activity change, appetite change, chills, fatigue, fever and weight loss.   HENT: Negative for congestion, ear discharge, ear pain, facial swelling, hearing loss, postnasal drip, rhinorrhea, sinus pressure, sore throat, trouble swallowing and voice change.    Eyes: Negative for pain, discharge and itching.   Respiratory: Negative for apnea, cough, choking, chest tightness, shortness of breath and wheezing.    Cardiovascular: Negative for chest pain, palpitations and leg swelling.   Gastrointestinal: Positive for bloating and constipation. Negative for abdominal distention, abdominal pain, anal bleeding, anorexia, blood in stool, diarrhea, dysphagia, hematemesis, hematochezia, jaundice, melena, nausea, rectal pain and vomiting.   Endocrine: Negative for polydipsia, polyphagia and polyuria.   Genitourinary: Negative for difficulty urinating, dysuria, flank pain and urgency.   Musculoskeletal: Negative for arthralgias, back pain, gait problem, joint  swelling, myalgias and neck pain.   Skin: Negative for color change, itching, pallor, rash and wound.   Neurological: Negative for dizziness, syncope, facial asymmetry, numbness and headaches.   Hematological: Negative for adenopathy.   Psychiatric/Behavioral: Negative for agitation, behavioral problems and confusion.       Objective:      Physical Exam  Vitals signs and nursing note reviewed.   Constitutional:       General: She is not in acute distress.     Appearance: Normal appearance. She is well-developed. She is not ill-appearing, toxic-appearing or diaphoretic.   HENT:      Head: Normocephalic and atraumatic.      Right Ear: Tympanic membrane, ear canal and external ear normal. There is no impacted cerumen.      Left Ear: Tympanic membrane, ear canal and external ear normal. There is no impacted cerumen.      Nose: Nose normal. No congestion or rhinorrhea.      Mouth/Throat:      Mouth: Mucous membranes are moist.      Pharynx: Oropharynx is clear. No oropharyngeal exudate or posterior oropharyngeal erythema.   Eyes:      General: No scleral icterus.        Right eye: No discharge.         Left eye: No discharge.      Extraocular Movements: Extraocular movements intact.      Conjunctiva/sclera: Conjunctivae normal.      Pupils: Pupils are equal, round, and reactive to light.   Neck:      Musculoskeletal: Normal range of motion and neck supple. No neck rigidity or muscular tenderness.      Thyroid: No thyromegaly.      Vascular: No carotid bruit or JVD.      Trachea: No tracheal deviation.   Cardiovascular:      Rate and Rhythm: Normal rate and regular rhythm.      Pulses: Normal pulses.      Heart sounds: Normal heart sounds. No murmur. No friction rub. No gallop.    Pulmonary:      Effort: Pulmonary effort is normal. No respiratory distress.      Breath sounds: Normal breath sounds. No stridor. No wheezing, rhonchi or rales.   Chest:      Chest wall: No tenderness.   Abdominal:      General: Abdomen is  flat. Bowel sounds are normal. There is no distension.      Palpations: Abdomen is soft. There is no mass.      Tenderness: There is abdominal tenderness in the left lower quadrant. There is no right CVA tenderness, left CVA tenderness, guarding or rebound. Negative signs include Cagle's sign, Rovsing's sign, McBurney's sign, psoas sign and obturator sign.      Hernia: No hernia is present.   Musculoskeletal: Normal range of motion.         General: No swelling, tenderness, deformity or signs of injury.      Right lower leg: No edema.      Left lower leg: No edema.   Lymphadenopathy:      Cervical: No cervical adenopathy.   Skin:     General: Skin is warm and dry.      Capillary Refill: Capillary refill takes less than 2 seconds.      Coloration: Skin is not jaundiced or pale.      Findings: No bruising, erythema, lesion or rash.   Neurological:      General: No focal deficit present.      Mental Status: She is alert and oriented to person, place, and time.      Cranial Nerves: No cranial nerve deficit.      Sensory: No sensory deficit.      Motor: No weakness or abnormal muscle tone.      Coordination: Coordination normal.      Gait: Gait normal.      Deep Tendon Reflexes: Reflexes normal.   Psychiatric:         Mood and Affect: Mood normal.         Behavior: Behavior normal.         Thought Content: Thought content normal.         Judgment: Judgment normal.         Assessment:       1. Constipation, unspecified constipation type    2. LLQ pain        Plan:    Cristina was seen today for GI problem.    Diagnoses and all orders for this visit:    Constipation, unspecified constipation type  -     bisacodyL (DULCOLAX) 5 mg EC tablet; Take 1 tablet (5 mg total) by mouth daily as needed for Constipation.  LLQ Pain  -     X-Ray Abdomen Flat And Erect; Future    Patient was counseled that these lifestyle changes can help prevent constipation:  · Diet. Eat a high-fiber diet, with fresh fruit and vegetables, and reduce dairy  intake, meats, and processed foods.  An over the counter fiber supplement is recommended such as Fiberchoice chewables or Metamucil.  · Fluids. It's important to get enough fluids each day. Drink plenty of water when you eat more fiber. If you are on diet that limits the amount of fluid you can have, talk about this with your health care provider.  · Regular exercise. Check with your health care provider first.  OTC probiotics may also be of benefit like Align to help regulation.    Patient education provided.  All questions and concerns addressed  Patient verbalizes understanding    Follow up in about 2 weeks (around 9/18/2020).

## 2021-01-02 ENCOUNTER — HOSPITAL ENCOUNTER (EMERGENCY)
Facility: HOSPITAL | Age: 26
Discharge: HOME | End: 2021-01-02
Attending: EMERGENCY MEDICINE
Payer: COMMERCIAL

## 2021-01-02 VITALS
OXYGEN SATURATION: 100 % | TEMPERATURE: 99 F | RESPIRATION RATE: 16 BRPM | DIASTOLIC BLOOD PRESSURE: 60 MMHG | WEIGHT: 121 LBS | SYSTOLIC BLOOD PRESSURE: 121 MMHG | HEART RATE: 57 BPM | BODY MASS INDEX: 22.13 KG/M2

## 2021-01-02 DIAGNOSIS — T14.8XXA MUSCLE STRAIN: Primary | ICD-10-CM

## 2021-01-02 DIAGNOSIS — S39.012A BACK STRAIN, INITIAL ENCOUNTER: ICD-10-CM

## 2021-01-02 DIAGNOSIS — S16.1XXA NECK STRAIN, INITIAL ENCOUNTER: ICD-10-CM

## 2021-01-02 PROCEDURE — 99284 EMERGENCY DEPT VISIT MOD MDM: CPT | Mod: 25,ER

## 2021-01-02 PROCEDURE — 96372 THER/PROPH/DIAG INJ SC/IM: CPT | Mod: ER

## 2021-01-02 PROCEDURE — 63600175 PHARM REV CODE 636 W HCPCS: Mod: ER | Performed by: EMERGENCY MEDICINE

## 2021-01-02 RX ORDER — KETOROLAC TROMETHAMINE 30 MG/ML
60 INJECTION, SOLUTION INTRAMUSCULAR; INTRAVENOUS
Status: COMPLETED | OUTPATIENT
Start: 2021-01-02 | End: 2021-01-02

## 2021-01-02 RX ORDER — TRAMADOL HYDROCHLORIDE AND ACETAMINOPHEN 37.5; 325 MG/1; MG/1
1 TABLET, FILM COATED ORAL EVERY 6 HOURS PRN
Qty: 9 TABLET | Refills: 0 | Status: SHIPPED | OUTPATIENT
Start: 2021-01-02 | End: 2021-05-30

## 2021-01-02 RX ADMIN — KETOROLAC TROMETHAMINE 60 MG: 30 INJECTION, SOLUTION INTRAMUSCULAR at 08:01

## 2021-04-29 ENCOUNTER — PATIENT MESSAGE (OUTPATIENT)
Dept: RESEARCH | Facility: HOSPITAL | Age: 26
End: 2021-04-29

## 2021-05-30 ENCOUNTER — HOSPITAL ENCOUNTER (EMERGENCY)
Facility: HOSPITAL | Age: 26
Discharge: HOME | End: 2021-05-31
Attending: FAMILY MEDICINE
Payer: COMMERCIAL

## 2021-05-30 VITALS
WEIGHT: 128 LBS | TEMPERATURE: 99 F | OXYGEN SATURATION: 99 % | DIASTOLIC BLOOD PRESSURE: 63 MMHG | HEART RATE: 70 BPM | HEIGHT: 62 IN | SYSTOLIC BLOOD PRESSURE: 119 MMHG | BODY MASS INDEX: 23.55 KG/M2 | RESPIRATION RATE: 18 BRPM

## 2021-05-30 DIAGNOSIS — K52.9 GASTROENTERITIS: Primary | ICD-10-CM

## 2021-05-30 LAB
B-HCG UR QL: NEGATIVE
BACTERIA #/AREA URNS AUTO: NORMAL /HPF
BILIRUB UR QL STRIP: NEGATIVE
CLARITY UR REFRACT.AUTO: CLEAR
COLOR UR AUTO: ABNORMAL
GLUCOSE UR QL STRIP: NEGATIVE
HGB UR QL STRIP: NEGATIVE
KETONES UR QL STRIP: NEGATIVE
LEUKOCYTE ESTERASE UR QL STRIP: ABNORMAL
MICROSCOPIC COMMENT: NORMAL
NITRITE UR QL STRIP: NEGATIVE
PH UR STRIP: 5 [PH] (ref 5–8)
PROT UR QL STRIP: ABNORMAL
SP GR UR STRIP: 1.02 (ref 1–1.03)
SQUAMOUS #/AREA URNS AUTO: 8 /HPF
URN SPEC COLLECT METH UR: ABNORMAL
UROBILINOGEN UR STRIP-ACNC: 1 EU/DL
WBC #/AREA URNS AUTO: 1 /HPF (ref 0–5)

## 2021-05-30 PROCEDURE — 99284 EMERGENCY DEPT VISIT MOD MDM: CPT | Mod: ER

## 2021-05-30 PROCEDURE — 25000003 PHARM REV CODE 250: Mod: ER | Performed by: FAMILY MEDICINE

## 2021-05-30 PROCEDURE — 81025 URINE PREGNANCY TEST: CPT | Mod: ER | Performed by: FAMILY MEDICINE

## 2021-05-30 PROCEDURE — 81000 URINALYSIS NONAUTO W/SCOPE: CPT | Mod: ER | Performed by: FAMILY MEDICINE

## 2021-05-30 RX ORDER — ONDANSETRON 4 MG/1
4 TABLET, ORALLY DISINTEGRATING ORAL
Status: COMPLETED | OUTPATIENT
Start: 2021-05-30 | End: 2021-05-30

## 2021-05-30 RX ORDER — DICYCLOMINE HYDROCHLORIDE 10 MG/1
20 CAPSULE ORAL
Status: COMPLETED | OUTPATIENT
Start: 2021-05-31 | End: 2021-05-31

## 2021-05-30 RX ADMIN — ONDANSETRON 4 MG: 4 TABLET, ORALLY DISINTEGRATING ORAL at 11:05

## 2021-05-31 PROCEDURE — 25000003 PHARM REV CODE 250: Mod: ER | Performed by: FAMILY MEDICINE

## 2021-05-31 RX ORDER — ONDANSETRON 4 MG/1
4 TABLET, FILM COATED ORAL EVERY 8 HOURS PRN
Qty: 12 TABLET | Refills: 0 | Status: SHIPPED | OUTPATIENT
Start: 2021-05-31 | End: 2021-11-06 | Stop reason: ALTCHOICE

## 2021-05-31 RX ORDER — DICYCLOMINE HYDROCHLORIDE 20 MG/1
20 TABLET ORAL 3 TIMES DAILY PRN
Qty: 30 TABLET | Refills: 0 | Status: SHIPPED | OUTPATIENT
Start: 2021-05-31 | End: 2021-11-06 | Stop reason: ALTCHOICE

## 2021-05-31 RX ORDER — FAMOTIDINE 40 MG/1
40 TABLET, FILM COATED ORAL DAILY
Qty: 30 TABLET | Refills: 0 | Status: SHIPPED | OUTPATIENT
Start: 2021-05-31 | End: 2021-11-06 | Stop reason: ALTCHOICE

## 2021-05-31 RX ADMIN — DICYCLOMINE HYDROCHLORIDE 20 MG: 10 CAPSULE ORAL at 12:05

## 2021-06-04 ENCOUNTER — TELEPHONE (OUTPATIENT)
Dept: FAMILY MEDICINE | Facility: CLINIC | Age: 26
End: 2021-06-04

## 2021-06-04 ENCOUNTER — OFFICE VISIT (OUTPATIENT)
Dept: OBSTETRICS AND GYNECOLOGY | Facility: CLINIC | Age: 26
End: 2021-06-04
Payer: COMMERCIAL

## 2021-06-04 VITALS
SYSTOLIC BLOOD PRESSURE: 100 MMHG | WEIGHT: 130.06 LBS | BODY MASS INDEX: 23.93 KG/M2 | DIASTOLIC BLOOD PRESSURE: 62 MMHG | HEIGHT: 62 IN

## 2021-06-04 DIAGNOSIS — Z97.5 NEXPLANON IN PLACE: ICD-10-CM

## 2021-06-04 DIAGNOSIS — Z12.4 ENCOUNTER FOR PAPANICOLAOU SMEAR FOR CERVICAL CANCER SCREENING: ICD-10-CM

## 2021-06-04 DIAGNOSIS — Z11.3 SCREEN FOR STD (SEXUALLY TRANSMITTED DISEASE): ICD-10-CM

## 2021-06-04 DIAGNOSIS — Z01.419 WOMEN'S ANNUAL ROUTINE GYNECOLOGICAL EXAMINATION: Primary | ICD-10-CM

## 2021-06-04 PROCEDURE — 87591 N.GONORRHOEAE DNA AMP PROB: CPT | Performed by: NURSE PRACTITIONER

## 2021-06-04 PROCEDURE — 99395 PREV VISIT EST AGE 18-39: CPT | Mod: S$GLB,,, | Performed by: NURSE PRACTITIONER

## 2021-06-04 PROCEDURE — 87481 CANDIDA DNA AMP PROBE: CPT | Mod: 59 | Performed by: NURSE PRACTITIONER

## 2021-06-04 PROCEDURE — 87491 CHLMYD TRACH DNA AMP PROBE: CPT | Performed by: NURSE PRACTITIONER

## 2021-06-04 PROCEDURE — 99999 PR PBB SHADOW E&M-EST. PATIENT-LVL III: ICD-10-PCS | Mod: PBBFAC,,, | Performed by: NURSE PRACTITIONER

## 2021-06-04 PROCEDURE — 99999 PR PBB SHADOW E&M-EST. PATIENT-LVL III: CPT | Mod: PBBFAC,,, | Performed by: NURSE PRACTITIONER

## 2021-06-04 PROCEDURE — 88175 CYTOPATH C/V AUTO FLUID REDO: CPT | Performed by: NURSE PRACTITIONER

## 2021-06-04 PROCEDURE — 99395 PR PREVENTIVE VISIT,EST,18-39: ICD-10-PCS | Mod: S$GLB,,, | Performed by: NURSE PRACTITIONER

## 2021-06-08 LAB
C TRACH DNA SPEC QL NAA+PROBE: NOT DETECTED
N GONORRHOEA DNA SPEC QL NAA+PROBE: NOT DETECTED

## 2021-06-10 ENCOUNTER — PATIENT MESSAGE (OUTPATIENT)
Dept: OBSTETRICS AND GYNECOLOGY | Facility: CLINIC | Age: 26
End: 2021-06-10

## 2021-06-10 DIAGNOSIS — N76.0 BV (BACTERIAL VAGINOSIS): Primary | ICD-10-CM

## 2021-06-10 DIAGNOSIS — B96.89 BV (BACTERIAL VAGINOSIS): Primary | ICD-10-CM

## 2021-06-10 LAB
BACTERIAL VAGINOSIS DNA: POSITIVE
CANDIDA GLABRATA DNA: NEGATIVE
CANDIDA KRUSEI DNA: NEGATIVE
CANDIDA RRNA VAG QL PROBE: NEGATIVE
FINAL PATHOLOGIC DIAGNOSIS: NORMAL
Lab: NORMAL
T VAGINALIS RRNA GENITAL QL PROBE: NEGATIVE

## 2021-06-16 ENCOUNTER — PATIENT MESSAGE (OUTPATIENT)
Dept: OBSTETRICS AND GYNECOLOGY | Facility: CLINIC | Age: 26
End: 2021-06-16

## 2021-06-16 DIAGNOSIS — N76.0 BV (BACTERIAL VAGINOSIS): ICD-10-CM

## 2021-06-16 DIAGNOSIS — B96.89 BV (BACTERIAL VAGINOSIS): ICD-10-CM

## 2021-06-16 RX ORDER — METRONIDAZOLE 500 MG/1
500 TABLET ORAL 2 TIMES DAILY
Qty: 14 TABLET | Refills: 0 | Status: SHIPPED | OUTPATIENT
Start: 2021-06-16 | End: 2021-06-23

## 2021-06-16 RX ORDER — METRONIDAZOLE 500 MG/1
500 TABLET ORAL 2 TIMES DAILY
Qty: 14 TABLET | Refills: 0 | Status: SHIPPED | OUTPATIENT
Start: 2021-06-16 | End: 2021-06-16 | Stop reason: SDUPTHER

## 2021-09-29 ENCOUNTER — TELEPHONE (OUTPATIENT)
Dept: OBSTETRICS AND GYNECOLOGY | Facility: CLINIC | Age: 26
End: 2021-09-29
Payer: COMMERCIAL

## 2021-09-30 ENCOUNTER — OFFICE VISIT (OUTPATIENT)
Dept: OBSTETRICS AND GYNECOLOGY | Facility: CLINIC | Age: 26
End: 2021-09-30
Payer: COMMERCIAL

## 2021-09-30 VITALS — WEIGHT: 122 LBS | BODY MASS INDEX: 22.31 KG/M2 | DIASTOLIC BLOOD PRESSURE: 76 MMHG | SYSTOLIC BLOOD PRESSURE: 120 MMHG

## 2021-09-30 DIAGNOSIS — Z11.3 SCREEN FOR STD (SEXUALLY TRANSMITTED DISEASE): Primary | ICD-10-CM

## 2021-09-30 DIAGNOSIS — N76.0 ACUTE VAGINITIS: ICD-10-CM

## 2021-09-30 PROCEDURE — 87491 CHLMYD TRACH DNA AMP PROBE: CPT | Performed by: NURSE PRACTITIONER

## 2021-09-30 PROCEDURE — 99213 OFFICE O/P EST LOW 20 MIN: CPT | Mod: S$GLB,,, | Performed by: NURSE PRACTITIONER

## 2021-09-30 PROCEDURE — 87481 CANDIDA DNA AMP PROBE: CPT | Mod: 59 | Performed by: NURSE PRACTITIONER

## 2021-09-30 PROCEDURE — 87591 N.GONORRHOEAE DNA AMP PROB: CPT | Performed by: NURSE PRACTITIONER

## 2021-09-30 PROCEDURE — 99213 PR OFFICE/OUTPT VISIT, EST, LEVL III, 20-29 MIN: ICD-10-PCS | Mod: S$GLB,,, | Performed by: NURSE PRACTITIONER

## 2021-09-30 PROCEDURE — 99999 PR PBB SHADOW E&M-EST. PATIENT-LVL III: CPT | Mod: PBBFAC,,, | Performed by: NURSE PRACTITIONER

## 2021-09-30 PROCEDURE — 99999 PR PBB SHADOW E&M-EST. PATIENT-LVL III: ICD-10-PCS | Mod: PBBFAC,,, | Performed by: NURSE PRACTITIONER

## 2021-09-30 RX ORDER — METRONIDAZOLE 500 MG/1
500 TABLET ORAL 2 TIMES DAILY
Qty: 14 TABLET | Refills: 0 | Status: SHIPPED | OUTPATIENT
Start: 2021-09-30 | End: 2021-10-07

## 2021-10-01 LAB
C TRACH DNA SPEC QL NAA+PROBE: NOT DETECTED
N GONORRHOEA DNA SPEC QL NAA+PROBE: NOT DETECTED

## 2021-10-06 ENCOUNTER — PATIENT MESSAGE (OUTPATIENT)
Dept: OBSTETRICS AND GYNECOLOGY | Facility: CLINIC | Age: 26
End: 2021-10-06

## 2021-10-06 DIAGNOSIS — N76.1 SUBACUTE VAGINITIS: Primary | ICD-10-CM

## 2021-10-06 LAB
BACTERIAL VAGINOSIS DNA: POSITIVE
CANDIDA GLABRATA DNA: NEGATIVE
CANDIDA KRUSEI DNA: NEGATIVE
CANDIDA RRNA VAG QL PROBE: NEGATIVE
T VAGINALIS RRNA GENITAL QL PROBE: NEGATIVE

## 2021-11-01 ENCOUNTER — HOSPITAL ENCOUNTER (EMERGENCY)
Facility: HOSPITAL | Age: 26
Discharge: HOME | End: 2021-11-01
Payer: COMMERCIAL

## 2021-11-01 VITALS
HEIGHT: 62 IN | TEMPERATURE: 99 F | DIASTOLIC BLOOD PRESSURE: 89 MMHG | HEART RATE: 64 BPM | RESPIRATION RATE: 17 BRPM | SYSTOLIC BLOOD PRESSURE: 127 MMHG | BODY MASS INDEX: 22.7 KG/M2 | OXYGEN SATURATION: 100 % | WEIGHT: 123.38 LBS

## 2021-11-01 DIAGNOSIS — H92.01 OTALGIA, RIGHT: ICD-10-CM

## 2021-11-01 DIAGNOSIS — H66.001 ACUTE SUPPURATIVE OTITIS MEDIA OF RIGHT EAR WITHOUT SPONTANEOUS RUPTURE OF TYMPANIC MEMBRANE, RECURRENCE NOT SPECIFIED: Primary | ICD-10-CM

## 2021-11-01 PROCEDURE — 99283 EMERGENCY DEPT VISIT LOW MDM: CPT | Mod: ER

## 2021-11-01 RX ORDER — AMOXICILLIN 500 MG/1
500 CAPSULE ORAL EVERY 12 HOURS
Qty: 20 CAPSULE | Refills: 0 | Status: SHIPPED | OUTPATIENT
Start: 2021-11-01 | End: 2021-11-06 | Stop reason: ALTCHOICE

## 2021-11-06 ENCOUNTER — OFFICE VISIT (OUTPATIENT)
Dept: FAMILY MEDICINE | Facility: CLINIC | Age: 26
End: 2021-11-06
Payer: COMMERCIAL

## 2021-11-06 VITALS
BODY MASS INDEX: 21.87 KG/M2 | WEIGHT: 123.44 LBS | OXYGEN SATURATION: 98 % | DIASTOLIC BLOOD PRESSURE: 72 MMHG | SYSTOLIC BLOOD PRESSURE: 100 MMHG | HEART RATE: 84 BPM | HEIGHT: 63 IN | TEMPERATURE: 98 F

## 2021-11-06 DIAGNOSIS — J01.00 ACUTE MAXILLARY SINUSITIS, RECURRENCE NOT SPECIFIED: ICD-10-CM

## 2021-11-06 DIAGNOSIS — H10.10 ALLERGIC RHINOCONJUNCTIVITIS: ICD-10-CM

## 2021-11-06 DIAGNOSIS — H66.001 ACUTE SUPPURATIVE OTITIS MEDIA OF RIGHT EAR WITHOUT SPONTANEOUS RUPTURE OF TYMPANIC MEMBRANE, RECURRENCE NOT SPECIFIED: Primary | ICD-10-CM

## 2021-11-06 DIAGNOSIS — J30.9 ALLERGIC RHINOCONJUNCTIVITIS: ICD-10-CM

## 2021-11-06 PROCEDURE — 99999 PR PBB SHADOW E&M-EST. PATIENT-LVL III: CPT | Mod: PBBFAC,,, | Performed by: FAMILY MEDICINE

## 2021-11-06 PROCEDURE — 99214 OFFICE O/P EST MOD 30 MIN: CPT | Mod: 25,S$GLB,, | Performed by: FAMILY MEDICINE

## 2021-11-06 PROCEDURE — 99999 PR PBB SHADOW E&M-EST. PATIENT-LVL III: ICD-10-PCS | Mod: PBBFAC,,, | Performed by: FAMILY MEDICINE

## 2021-11-06 PROCEDURE — 96372 THER/PROPH/DIAG INJ SC/IM: CPT | Mod: S$GLB,,, | Performed by: FAMILY MEDICINE

## 2021-11-06 PROCEDURE — 96372 PR INJECTION,THERAP/PROPH/DIAG2ST, IM OR SUBCUT: ICD-10-PCS | Mod: S$GLB,,, | Performed by: FAMILY MEDICINE

## 2021-11-06 PROCEDURE — 99214 PR OFFICE/OUTPT VISIT, EST, LEVL IV, 30-39 MIN: ICD-10-PCS | Mod: 25,S$GLB,, | Performed by: FAMILY MEDICINE

## 2021-11-06 RX ORDER — AMOXICILLIN AND CLAVULANATE POTASSIUM 875; 125 MG/1; MG/1
1 TABLET, FILM COATED ORAL EVERY 12 HOURS
Qty: 20 TABLET | Refills: 0 | Status: SHIPPED | OUTPATIENT
Start: 2021-11-06 | End: 2021-11-16

## 2021-11-06 RX ORDER — TRIAMCINOLONE ACETONIDE 40 MG/ML
80 INJECTION, SUSPENSION INTRA-ARTICULAR; INTRAMUSCULAR
Status: COMPLETED | OUTPATIENT
Start: 2021-11-06 | End: 2021-11-06

## 2021-11-06 RX ORDER — FEXOFENADINE HCL AND PSEUDOEPHEDRINE HCI 180; 240 MG/1; MG/1
1 TABLET, EXTENDED RELEASE ORAL DAILY
Qty: 10 TABLET | Refills: 0 | Status: SHIPPED | OUTPATIENT
Start: 2021-11-06 | End: 2021-11-16

## 2021-11-06 RX ADMIN — TRIAMCINOLONE ACETONIDE 80 MG: 40 INJECTION, SUSPENSION INTRA-ARTICULAR; INTRAMUSCULAR at 08:11

## 2021-11-08 ENCOUNTER — PES CALL (OUTPATIENT)
Dept: ADMINISTRATIVE | Facility: CLINIC | Age: 26
End: 2021-11-08
Payer: COMMERCIAL

## 2022-03-28 ENCOUNTER — PATIENT MESSAGE (OUTPATIENT)
Dept: OBSTETRICS AND GYNECOLOGY | Facility: CLINIC | Age: 27
End: 2022-03-28
Payer: COMMERCIAL

## 2022-04-07 ENCOUNTER — LAB VISIT (OUTPATIENT)
Dept: LAB | Facility: HOSPITAL | Age: 27
End: 2022-04-07
Attending: NURSE PRACTITIONER
Payer: COMMERCIAL

## 2022-04-07 ENCOUNTER — OFFICE VISIT (OUTPATIENT)
Dept: OBSTETRICS AND GYNECOLOGY | Facility: CLINIC | Age: 27
End: 2022-04-07
Payer: COMMERCIAL

## 2022-04-07 VITALS
WEIGHT: 107.81 LBS | DIASTOLIC BLOOD PRESSURE: 70 MMHG | HEIGHT: 63 IN | SYSTOLIC BLOOD PRESSURE: 112 MMHG | BODY MASS INDEX: 19.1 KG/M2

## 2022-04-07 DIAGNOSIS — Z11.3 SCREEN FOR STD (SEXUALLY TRANSMITTED DISEASE): ICD-10-CM

## 2022-04-07 DIAGNOSIS — Z01.419 WOMEN'S ANNUAL ROUTINE GYNECOLOGICAL EXAMINATION: Primary | ICD-10-CM

## 2022-04-07 PROCEDURE — 99395 PREV VISIT EST AGE 18-39: CPT | Mod: S$GLB,,, | Performed by: NURSE PRACTITIONER

## 2022-04-07 PROCEDURE — 80074 ACUTE HEPATITIS PANEL: CPT | Performed by: NURSE PRACTITIONER

## 2022-04-07 PROCEDURE — 86592 SYPHILIS TEST NON-TREP QUAL: CPT | Performed by: NURSE PRACTITIONER

## 2022-04-07 PROCEDURE — 99999 PR PBB SHADOW E&M-EST. PATIENT-LVL III: ICD-10-PCS | Mod: PBBFAC,,, | Performed by: NURSE PRACTITIONER

## 2022-04-07 PROCEDURE — 87591 N.GONORRHOEAE DNA AMP PROB: CPT | Mod: 59 | Performed by: NURSE PRACTITIONER

## 2022-04-07 PROCEDURE — 87481 CANDIDA DNA AMP PROBE: CPT | Mod: 59 | Performed by: NURSE PRACTITIONER

## 2022-04-07 PROCEDURE — 99999 PR PBB SHADOW E&M-EST. PATIENT-LVL III: CPT | Mod: PBBFAC,,, | Performed by: NURSE PRACTITIONER

## 2022-04-07 PROCEDURE — 36415 COLL VENOUS BLD VENIPUNCTURE: CPT | Mod: PN | Performed by: NURSE PRACTITIONER

## 2022-04-07 PROCEDURE — 87389 HIV-1 AG W/HIV-1&-2 AB AG IA: CPT | Performed by: NURSE PRACTITIONER

## 2022-04-07 PROCEDURE — 87491 CHLMYD TRACH DNA AMP PROBE: CPT | Performed by: NURSE PRACTITIONER

## 2022-04-07 PROCEDURE — 99395 PR PREVENTIVE VISIT,EST,18-39: ICD-10-PCS | Mod: S$GLB,,, | Performed by: NURSE PRACTITIONER

## 2022-04-07 NOTE — PROGRESS NOTES
CC: Annual  HPI: Pt is a 26 y.o.  female who presents for routine annual exam. She is now working at [employer]. 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 She uses Nexplanon for contraception- exp 1/23. She does want STD screening.  Denies any GYN complaints.  The patient participates in regular exercise: yes.  The patient does not smoke.  The patient wears seatbelts.   Pt denies any domestic violence.     FH:  Breast cancer: none  Colon cancer: none  Ovarian cancer: none  Endometrial cancer: none    ROS:  GENERAL: Feeling well overall. Denies fever or chills.   SKIN: Denies rash or lesions.   HEAD: Denies head injury or headache.   NODES: Denies enlarged lymph nodes.   CHEST: Denies chest pain or shortness of breath.   CARDIOVASCULAR: Denies palpitations or left sided chest pain.   ABDOMEN: No abdominal pain, constipation, diarrhea, nausea, vomiting or rectal bleeding.   URINARY: No dysuria, hematuria, or burning on urination.  REPRODUCTIVE: See HPI.   BREASTS: Denies pain, lumps, or nipple discharge.   HEMATOLOGIC: No easy bruisability or excessive bleeding.   MUSCULOSKELETAL: Denies joint pain or swelling.   NEUROLOGIC: Denies syncope or weakness.   PSYCHIATRIC: Denies depression, anxiety or mood swings.    PE:   APPEARANCE: Well nourished, well developed, Black or  female in no acute distress.  NODES: no cervical, supraclavicular, or inguinal lymphadenopathy  BREASTS: Symmetrical, no skin changes or visible lesions. No palpable masses, nipple discharge or adenopathy bilaterally.  ABDOMEN: Soft. No tenderness or masses. No distention. No hernias palpated. No CVA tenderness.  VULVA: No lesions. Normal external female genitalia.  URETHRAL MEATUS: Normal size and location, no lesions, no prolapse.  URETHRA: No masses, tenderness, or prolapse.  VAGINA: Moist. No lesions or lacerations noted. No abnormal discharge present. No odor present.   CERVIX: No lesions or discharge. No cervical motion tenderness.    UTERUS: Normal size, regular shape, mobile, non-tender.  ADNEXA: No tenderness. No fullness or masses palpated in the adnexal regions.   ANUS PERINEUM: Normal.      Diagnosis:  1. Women's annual routine gynecological examination    2. Screen for STD (sexually transmitted disease)        Plan:   Pap not indicated- last pap 6/21 WNL  STD screening     Orders Placed This Encounter    C. trachomatis/N. gonorrhoeae by AMP DNA [facility]; Vagina    Vaginosis Screen by DNA Probe    HIV 1/2 Ag/Ab (4th Gen)    RPR    Hepatitis Panel, Acute       Patient was counseled today on the new ACS guidelines for cervical cytology screening as well as the current recommendations for breast cancer screening. She was counseled to follow up with her PCP for other routine health maintenance. Counseling session lasted approximately 10 minutes, and all her questions were answered.    Follow-up with me in 1 year for routine exam    Jo Valdivia, YEFRI-C

## 2022-04-08 LAB
BACTERIAL VAGINOSIS DNA: NEGATIVE
C TRACH DNA SPEC QL NAA+PROBE: NOT DETECTED
CANDIDA GLABRATA DNA: NEGATIVE
CANDIDA KRUSEI DNA: NEGATIVE
CANDIDA RRNA VAG QL PROBE: NEGATIVE
N GONORRHOEA DNA SPEC QL NAA+PROBE: NOT DETECTED
RPR SER QL: NORMAL
T VAGINALIS RRNA GENITAL QL PROBE: NEGATIVE

## 2022-04-11 LAB
HAV IGM SERPL QL IA: NEGATIVE
HBV CORE IGM SERPL QL IA: NEGATIVE
HBV SURFACE AG SERPL QL IA: NEGATIVE
HCV AB SERPL QL IA: NEGATIVE
HIV 1+2 AB+HIV1 P24 AG SERPL QL IA: NEGATIVE

## 2022-05-10 ENCOUNTER — HOSPITAL ENCOUNTER (EMERGENCY)
Facility: HOSPITAL | Age: 27
Discharge: HOME | End: 2022-05-10
Attending: EMERGENCY MEDICINE
Payer: COMMERCIAL

## 2022-05-10 VITALS
DIASTOLIC BLOOD PRESSURE: 85 MMHG | SYSTOLIC BLOOD PRESSURE: 116 MMHG | RESPIRATION RATE: 15 BRPM | BODY MASS INDEX: 18.78 KG/M2 | OXYGEN SATURATION: 98 % | WEIGHT: 106 LBS | HEART RATE: 93 BPM | TEMPERATURE: 98 F

## 2022-05-10 DIAGNOSIS — R05.9 COUGH: ICD-10-CM

## 2022-05-10 DIAGNOSIS — J10.1 INFLUENZA A: Primary | ICD-10-CM

## 2022-05-10 LAB
ALBUMIN SERPL BCP-MCNC: 4.7 G/DL (ref 3.5–5.2)
ALP SERPL-CCNC: 71 U/L (ref 38–126)
ALT SERPL W/O P-5'-P-CCNC: 22 U/L (ref 10–44)
ANION GAP SERPL CALC-SCNC: 13 MMOL/L (ref 8–16)
AST SERPL-CCNC: 27 U/L (ref 15–46)
B-HCG UR QL: NEGATIVE
BASOPHILS # BLD AUTO: 0.05 K/UL (ref 0–0.2)
BASOPHILS NFR BLD: 1 % (ref 0–1.9)
BILIRUB SERPL-MCNC: 1.6 MG/DL (ref 0.1–1)
BILIRUB UR QL STRIP: NEGATIVE
CALCIUM SERPL-MCNC: 9.1 MG/DL (ref 8.7–10.5)
CHLORIDE SERPL-SCNC: 104 MMOL/L (ref 95–110)
CLARITY UR REFRACT.AUTO: CLEAR
CO2 SERPL-SCNC: 23 MMOL/L (ref 23–29)
COLOR UR AUTO: ABNORMAL
CREAT SERPL-MCNC: 0.75 MG/DL (ref 0.5–1.4)
DIFFERENTIAL METHOD: ABNORMAL
EOSINOPHIL # BLD AUTO: 0.1 K/UL (ref 0–0.5)
EOSINOPHIL NFR BLD: 1.5 % (ref 0–8)
ERYTHROCYTE [DISTWIDTH] IN BLOOD BY AUTOMATED COUNT: 12 % (ref 11.5–14.5)
EST. GFR  (AFRICAN AMERICAN): >60 ML/MIN/1.73 M^2
EST. GFR  (NON AFRICAN AMERICAN): >60 ML/MIN/1.73 M^2
GLUCOSE SERPL-MCNC: 106 MG/DL (ref 70–110)
GLUCOSE UR QL STRIP: NEGATIVE
HCT VFR BLD AUTO: 41.4 % (ref 37–48.5)
HGB BLD-MCNC: 13.4 G/DL (ref 12–16)
HGB UR QL STRIP: NEGATIVE
IMM GRANULOCYTES # BLD AUTO: 0.02 K/UL (ref 0–0.04)
IMM GRANULOCYTES NFR BLD AUTO: 0.4 % (ref 0–0.5)
INFLUENZA A, MOLECULAR: POSITIVE
INFLUENZA B, MOLECULAR: NEGATIVE
KETONES UR QL STRIP: ABNORMAL
LEUKOCYTE ESTERASE UR QL STRIP: NEGATIVE
LIPASE SERPL-CCNC: 21 U/L (ref 23–300)
LYMPHOCYTES # BLD AUTO: 0.9 K/UL (ref 1–4.8)
LYMPHOCYTES NFR BLD: 19.1 % (ref 18–48)
MCH RBC QN AUTO: 28.2 PG (ref 27–31)
MCHC RBC AUTO-ENTMCNC: 32.4 G/DL (ref 32–36)
MCV RBC AUTO: 87 FL (ref 82–98)
MONOCYTES # BLD AUTO: 0.9 K/UL (ref 0.3–1)
MONOCYTES NFR BLD: 18.3 % (ref 4–15)
NEUTROPHILS # BLD AUTO: 2.9 K/UL (ref 1.8–7.7)
NEUTROPHILS NFR BLD: 59.7 % (ref 38–73)
NITRITE UR QL STRIP: NEGATIVE
NRBC BLD-RTO: 0 /100 WBC
PH UR STRIP: 7 [PH] (ref 5–8)
PLATELET # BLD AUTO: 185 K/UL (ref 150–450)
PMV BLD AUTO: 11.3 FL (ref 9.2–12.9)
POTASSIUM SERPL-SCNC: 3.9 MMOL/L (ref 3.5–5.1)
PROT SERPL-MCNC: 8.7 G/DL (ref 6–8.4)
PROT UR QL STRIP: ABNORMAL
RBC # BLD AUTO: 4.76 M/UL (ref 4–5.4)
SARS-COV-2 RDRP RESP QL NAA+PROBE: NEGATIVE
SODIUM SERPL-SCNC: 140 MMOL/L (ref 136–145)
SP GR UR STRIP: 1.01 (ref 1–1.03)
SPECIMEN SOURCE: ABNORMAL
URN SPEC COLLECT METH UR: ABNORMAL
UROBILINOGEN UR STRIP-ACNC: 1 EU/DL
UUN UR-MCNC: 7 MG/DL (ref 7–17)
WBC # BLD AUTO: 4.81 K/UL (ref 3.9–12.7)

## 2022-05-10 PROCEDURE — 25000003 PHARM REV CODE 250: Mod: ER | Performed by: PHYSICIAN ASSISTANT

## 2022-05-10 PROCEDURE — 63600175 PHARM REV CODE 636 W HCPCS: Mod: ER | Performed by: PHYSICIAN ASSISTANT

## 2022-05-10 PROCEDURE — 99284 EMERGENCY DEPT VISIT MOD MDM: CPT | Mod: 25,ER

## 2022-05-10 PROCEDURE — 87502 INFLUENZA DNA AMP PROBE: CPT | Mod: ER

## 2022-05-10 PROCEDURE — 81025 URINE PREGNANCY TEST: CPT | Mod: ER | Performed by: EMERGENCY MEDICINE

## 2022-05-10 PROCEDURE — 81003 URINALYSIS AUTO W/O SCOPE: CPT | Mod: ER | Performed by: EMERGENCY MEDICINE

## 2022-05-10 PROCEDURE — 96361 HYDRATE IV INFUSION ADD-ON: CPT | Mod: ER

## 2022-05-10 PROCEDURE — 87502 INFLUENZA DNA AMP PROBE: CPT | Mod: ER | Performed by: EMERGENCY MEDICINE

## 2022-05-10 PROCEDURE — 85025 COMPLETE CBC W/AUTO DIFF WBC: CPT | Mod: ER | Performed by: EMERGENCY MEDICINE

## 2022-05-10 PROCEDURE — 96374 THER/PROPH/DIAG INJ IV PUSH: CPT | Mod: ER

## 2022-05-10 PROCEDURE — 83690 ASSAY OF LIPASE: CPT | Mod: ER | Performed by: EMERGENCY MEDICINE

## 2022-05-10 PROCEDURE — 80053 COMPREHEN METABOLIC PANEL: CPT | Mod: ER | Performed by: EMERGENCY MEDICINE

## 2022-05-10 PROCEDURE — U0002 COVID-19 LAB TEST NON-CDC: HCPCS | Mod: ER | Performed by: EMERGENCY MEDICINE

## 2022-05-10 RX ORDER — ONDANSETRON 4 MG/1
4 TABLET, FILM COATED ORAL EVERY 12 HOURS PRN
Qty: 12 TABLET | Refills: 0 | Status: SHIPPED | OUTPATIENT
Start: 2022-05-10 | End: 2022-11-11

## 2022-05-10 RX ORDER — ACETAMINOPHEN 500 MG
1000 TABLET ORAL
Status: DISCONTINUED | OUTPATIENT
Start: 2022-05-10 | End: 2022-05-10 | Stop reason: HOSPADM

## 2022-05-10 RX ORDER — ONDANSETRON 2 MG/ML
4 INJECTION INTRAMUSCULAR; INTRAVENOUS
Status: COMPLETED | OUTPATIENT
Start: 2022-05-10 | End: 2022-05-10

## 2022-05-10 RX ORDER — BENZONATATE 100 MG/1
100 CAPSULE ORAL 3 TIMES DAILY PRN
Qty: 10 CAPSULE | Refills: 0 | Status: SHIPPED | OUTPATIENT
Start: 2022-05-10 | End: 2022-05-20

## 2022-05-10 RX ADMIN — SODIUM CHLORIDE 1000 ML: 0.9 INJECTION, SOLUTION INTRAVENOUS at 10:05

## 2022-05-10 RX ADMIN — ONDANSETRON 4 MG: 2 INJECTION INTRAMUSCULAR; INTRAVENOUS at 10:05

## 2022-05-10 NOTE — ED PROVIDER NOTES
Encounter Date: 5/10/2022       History     Chief Complaint   Patient presents with    Vomiting     I have been vomiting for two days. I also have a headache.      26-year-old female presents emergency department for evaluation of 3 day history of nasal congestion, runny nose, cough, frontal headache, generalized body aches, nausea and vomiting.  She reports that the symptoms began gradually 3 days ago and have been intermittent since onset.  She reports that 4 days ago she without heavily drinking, and then the symptoms began the next day.  No treatment was attempted at home or prior to arrival.  She denies any fever, dizziness, vision changes, weakness, neck pain, chest pain, shortness of breath, palpitations, flank pain, pelvic pain, dysuria, diarrhea or constipation.  She denies any known sick contacts.  Patient also reports some weight loss over the last 2-3 months.  She reports that she has not seen her primary care provider for this yet.  She denies any dietary changes or intentional weight loss.  She denies any night sweats, fevers, rashes or new medications.          Review of patient's allergies indicates:   Allergen Reactions    Zithromax [azithromycin]      No past medical history on file.  Past Surgical History:   Procedure Laterality Date    ANTERIOR CRUCIATE LIGAMENT REPAIR      HERNIA REPAIR       Family History   Problem Relation Age of Onset    Breast cancer Neg Hx     Ovarian cancer Neg Hx     Colon cancer Neg Hx      Social History     Tobacco Use    Smoking status: Never Smoker    Smokeless tobacco: Never Used   Substance Use Topics    Alcohol use: Yes     Comment: occas    Drug use: Not Currently     Types: Marijuana     Comment: daily-     Review of Systems   Constitutional: Negative for activity change, appetite change and fever.   HENT: Positive for congestion, rhinorrhea and sinus pressure. Negative for ear discharge, ear pain, facial swelling, sinus pain, sore throat and trouble  swallowing.    Eyes: Negative for photophobia, redness and visual disturbance.   Respiratory: Positive for cough. Negative for chest tightness, shortness of breath and wheezing.    Cardiovascular: Negative for chest pain, palpitations and leg swelling.   Gastrointestinal: Positive for nausea and vomiting. Negative for abdominal pain, constipation and diarrhea.   Genitourinary: Negative for decreased urine volume, dysuria, flank pain, pelvic pain, vaginal bleeding and vaginal discharge.   Musculoskeletal: Negative for back pain and neck pain.   Skin: Negative for rash.   Neurological: Positive for headaches. Negative for dizziness, syncope, weakness, light-headedness and numbness.       Physical Exam     Initial Vitals [05/10/22 0941]   BP Pulse Resp Temp SpO2   116/85 93 15 98.3 °F (36.8 °C) 98 %      MAP       --         Physical Exam    Nursing note and vitals reviewed.  Constitutional: She appears well-developed and well-nourished. She is not diaphoretic. No distress.   HENT:   Head: Normocephalic and atraumatic.   Right Ear: External ear normal.   Left Ear: External ear normal.   Nose: Nose normal.   Mouth/Throat: Oropharynx is clear and moist.   Eyes: Conjunctivae and EOM are normal. Pupils are equal, round, and reactive to light.   Neck: Neck supple.   Normal range of motion.  Cardiovascular: Normal rate, regular rhythm and normal heart sounds.   Pulmonary/Chest: Breath sounds normal. No respiratory distress. She has no wheezes. She has no rhonchi. She has no rales. She exhibits no tenderness.   Abdominal: Abdomen is soft. Bowel sounds are normal. She exhibits no distension. There is no abdominal tenderness.   No right CVA tenderness.  No left CVA tenderness. There is no rebound.   Musculoskeletal:      Cervical back: Normal range of motion and neck supple.     Neurological: She is alert and oriented to person, place, and time. No cranial nerve deficit.   Skin: Skin is warm and dry.   Psychiatric: She has a  normal mood and affect.         ED Course   Procedures  Labs Reviewed   INFLUENZA A & B BY MOLECULAR - Abnormal; Notable for the following components:       Result Value    Influenza A, Molecular Positive (*)     All other components within normal limits   CBC W/ AUTO DIFFERENTIAL - Abnormal; Notable for the following components:    Lymph # 0.9 (*)     Mono % 18.3 (*)     All other components within normal limits   COMPREHENSIVE METABOLIC PANEL - Abnormal; Notable for the following components:    Total Protein 8.7 (*)     Total Bilirubin 1.6 (*)     All other components within normal limits   LIPASE - Abnormal; Notable for the following components:    Lipase Result 21 (*)     All other components within normal limits   URINALYSIS, REFLEX TO URINE CULTURE - Abnormal; Notable for the following components:    Protein, UA Trace (*)     Ketones, UA 2+ (*)     All other components within normal limits    Narrative:     Preferred Collection Type->Urine, Clean Catch  Specimen Source->Urine  Collection Type->Urine, Clean Catch   PREGNANCY TEST, URINE RAPID    Narrative:     Specimen Source->Urine   SARS-COV-2 RNA AMPLIFICATION, QUAL    Narrative:     Is the patient symptomatic?->Yes          Imaging Results          X-Ray Chest PA And Lateral (Final result)  Result time 05/10/22 10:50:05    Final result by Chnu Park MD (05/10/22 10:50:05)                 Impression:      No acute process seen.      Electronically signed by: Chun Park MD  Date:    05/10/2022  Time:    10:50             Narrative:    EXAMINATION:  XR CHEST PA AND LATERAL    CLINICAL HISTORY:  Cough, unspecified    COMPARISON:  02/28/2018    FINDINGS:  Cardiac silhouette is normal.  The lungs demonstrate no evidence of active disease.  No evidence of pleural effusion or pneumothorax.  Bones appear intact.                                 Medications   sodium chloride 0.9% bolus 1,000 mL (1,000 mLs Intravenous New Bag 5/10/22 5285)   acetaminophen  tablet 1,000 mg (has no administration in time range)   ondansetron injection 4 mg (4 mg Intravenous Given 5/10/22 1034)     Medical Decision Making:   Initial Assessment:   26-year-old female presents emergency department for evaluation of nausea, vomiting, frontal headache, nasal congestion, cough and generalized body aches.  Physical exam reveals a nontoxic-appearing female no acute distress.  Patient is afebrile vital signs within normal limits.  Neurological exam reveals an alert and oriented patient.  Neck is supple, no meningeal signs noted.  Lungs clear to auscultation bilaterally.  Abdominal exam reveals soft abdomen, nontender to palpation.  No CVA tenderness noted.  Differential Diagnosis:   Viral syndrome  COVID-19  Influenza  Dehydration  Pregnancy  Electrolyte abnormality  Renal insufficiency  ED Management:  CBC reveals no acute leukocytosis or anemia.  CMP reveals total bilirubin 1.6, all other results within normal limits.  Lipase 21. Urinalysis reveals no evidence of urinary tract infection and 2+ ketones.  UPT negative.  Rapid COVID negative.  Influenza positive.  Chest x-ray report reveals no acute process noted.  Discussed these findings at length patient verbalizes understanding and agreement course of treatment.  Instructed the patient to follow up with her primary care provider for re-evaluation and to return to the emergency department immediately for any new or worsening symptoms.  Discussed Tamiflu, patient declined at this time.                      Clinical Impression:   Final diagnoses:  [R05.9] Cough  [J10.1] Influenza A (Primary)          ED Disposition Condition    Discharge Stable        ED Prescriptions     Medication Sig Dispense Start Date End Date Auth. Provider    ondansetron (ZOFRAN) 4 MG tablet Take 1 tablet (4 mg total) by mouth every 12 (twelve) hours as needed for Nausea. 12 tablet 5/10/2022  Vero Aguayo PA-C    benzonatate (TESSALON) 100 MG capsule Take 1  capsule (100 mg total) by mouth 3 (three) times daily as needed. 10 capsule 5/10/2022 5/20/2022 Vero Aguayo PA-C        Follow-up Information    None          Vero Aguayo PA-C  05/10/22 1121

## 2022-06-12 ENCOUNTER — HOSPITAL ENCOUNTER (EMERGENCY)
Facility: HOSPITAL | Age: 27
Discharge: HOME | End: 2022-06-12
Attending: EMERGENCY MEDICINE
Payer: COMMERCIAL

## 2022-06-12 VITALS
TEMPERATURE: 99 F | HEIGHT: 63 IN | HEART RATE: 72 BPM | WEIGHT: 111 LBS | OXYGEN SATURATION: 100 % | RESPIRATION RATE: 16 BRPM | BODY MASS INDEX: 19.67 KG/M2 | DIASTOLIC BLOOD PRESSURE: 83 MMHG | SYSTOLIC BLOOD PRESSURE: 136 MMHG

## 2022-06-12 DIAGNOSIS — H66.91 RIGHT OTITIS MEDIA, UNSPECIFIED OTITIS MEDIA TYPE: Primary | ICD-10-CM

## 2022-06-12 LAB — SARS-COV-2 RDRP RESP QL NAA+PROBE: NEGATIVE

## 2022-06-12 PROCEDURE — 99283 EMERGENCY DEPT VISIT LOW MDM: CPT | Mod: ER

## 2022-06-12 PROCEDURE — U0002 COVID-19 LAB TEST NON-CDC: HCPCS | Mod: ER | Performed by: EMERGENCY MEDICINE

## 2022-06-12 PROCEDURE — 25000003 PHARM REV CODE 250: Mod: ER | Performed by: EMERGENCY MEDICINE

## 2022-06-12 RX ORDER — CEFDINIR 300 MG/1
300 CAPSULE ORAL 2 TIMES DAILY
Qty: 20 CAPSULE | Refills: 0 | Status: SHIPPED | OUTPATIENT
Start: 2022-06-12 | End: 2022-06-22

## 2022-06-12 RX ORDER — IBUPROFEN 600 MG/1
600 TABLET ORAL
Status: COMPLETED | OUTPATIENT
Start: 2022-06-12 | End: 2022-06-12

## 2022-06-12 RX ORDER — AMOXICILLIN AND CLAVULANATE POTASSIUM 875; 125 MG/1; MG/1
1 TABLET, FILM COATED ORAL
Status: COMPLETED | OUTPATIENT
Start: 2022-06-12 | End: 2022-06-12

## 2022-06-12 RX ADMIN — IBUPROFEN 600 MG: 600 TABLET, FILM COATED ORAL at 11:06

## 2022-06-12 RX ADMIN — AMOXICILLIN AND CLAVULANATE POTASSIUM 1 TABLET: 875; 125 TABLET, FILM COATED ORAL at 11:06

## 2022-06-13 NOTE — ED PROVIDER NOTES
Encounter Date: 6/12/2022       History     Chief Complaint   Patient presents with    Otalgia     Right ear and posterior neck pain. Patient also c/o a sore throat.      Patient currently presents with complaint of ear pain.  This is localized to the R ear.  Onset was noted yesterday.  There has not been associated drainage.  There have been associated upper respiratory symptoms.  Hearing loss is noted.  There is not suspected FB.          Review of patient's allergies indicates:   Allergen Reactions    Zithromax [azithromycin]      No past medical history on file.  Past Surgical History:   Procedure Laterality Date    ANTERIOR CRUCIATE LIGAMENT REPAIR      HERNIA REPAIR       Family History   Problem Relation Age of Onset    Breast cancer Neg Hx     Ovarian cancer Neg Hx     Colon cancer Neg Hx      Social History     Tobacco Use    Smoking status: Never Smoker    Smokeless tobacco: Never Used   Substance Use Topics    Alcohol use: Yes     Comment: occas    Drug use: Not Currently     Types: Marijuana     Comment: daily-     Review of Systems   Constitutional: Negative for chills and fever.   HENT: Positive for ear pain. Negative for congestion, ear discharge and rhinorrhea.    Respiratory: Negative for cough and shortness of breath.    Cardiovascular: Negative for chest pain and palpitations.   Gastrointestinal: Negative for abdominal pain, diarrhea and vomiting.   Genitourinary: Negative for dysuria.   Skin: Negative for color change and rash.   Allergic/Immunologic: Negative for immunocompromised state.   Neurological: Negative for dizziness and light-headedness.   Hematological: Negative for adenopathy. Does not bruise/bleed easily.     Physical Exam     Initial Vitals [06/12/22 2228]   BP Pulse Resp Temp SpO2   136/83 72 16 99 °F (37.2 °C) 100 %      MAP       --         Physical Exam    Nursing note and vitals reviewed.  Constitutional: She appears well-developed and well-nourished. She is not  diaphoretic. No distress.   HENT:   Head: Normocephalic and atraumatic.   Right Ear: Hearing, external ear and ear canal normal. There is tenderness. Tympanic membrane is retracted. A middle ear effusion is present.   Left Ear: Hearing normal.   Nose: Nose normal.   Mouth/Throat: Oropharynx is clear and moist.   Eyes: Conjunctivae are normal. No scleral icterus.   Cardiovascular: Normal rate, regular rhythm, normal heart sounds and intact distal pulses.   Pulmonary/Chest: Breath sounds normal. No respiratory distress.     Neurological: She is alert and oriented to person, place, and time.   Skin: Skin is warm and dry.         ED Course   Procedures  Labs Reviewed   SARS-COV-2 RNA AMPLIFICATION, QUAL    Narrative:     Is the patient symptomatic?->Yes          Imaging Results    None          Medications   amoxicillin-clavulanate 875-125mg per tablet 1 tablet (1 tablet Oral Given 6/12/22 2301)   ibuprofen tablet 600 mg (600 mg Oral Given 6/12/22 2301)     Medical Decision Making:   ED Management:  All findings were reviewed with the patient/family in detail.  I see no indication of an emergent process beyond that addressed during our encounter but have duly counseled the patient/family regarding the need for prompt follow-up as well as the indications that should prompt immediate return to the emergency room should new or worrisome developments occur.  The patient has additionally been provided with printed information regarding diagnosis as well as instructions regarding follow up and any medications intended to manage the patient's aforementioned conditions.  The patient/family communicates understanding of all this information and all remaining questions and concerns were addressed at this time.                            Clinical Impression:   Final diagnoses:  [H66.91] Right otitis media, unspecified otitis media type (Primary)          ED Disposition Condition    Discharge Stable        ED Prescriptions      Medication Sig Dispense Start Date End Date Auth. Provider    cefdinir (OMNICEF) 300 MG capsule Take 1 capsule (300 mg total) by mouth 2 (two) times daily. for 10 days 20 capsule 6/12/2022 6/22/2022 Michael Davis MD        Follow-up Information     Follow up With Specialties Details Why Contact Info    PCP  Schedule an appointment as soon as possible for a visit  for reassessment     [facility] - Emergency Dept Emergency Medicine Go to  As needed, If symptoms worsen [address] [facility] [address] [phone]           Michael Davis MD  06/13/22 0225

## 2022-07-05 ENCOUNTER — HOSPITAL ENCOUNTER (EMERGENCY)
Facility: HOSPITAL | Age: 27
Discharge: HOME | End: 2022-07-05
Attending: EMERGENCY MEDICINE
Payer: COMMERCIAL

## 2022-07-05 VITALS
OXYGEN SATURATION: 100 % | SYSTOLIC BLOOD PRESSURE: 132 MMHG | DIASTOLIC BLOOD PRESSURE: 62 MMHG | TEMPERATURE: 99 F | WEIGHT: 112 LBS | HEIGHT: 62 IN | BODY MASS INDEX: 20.61 KG/M2 | HEART RATE: 70 BPM | RESPIRATION RATE: 20 BRPM

## 2022-07-05 DIAGNOSIS — M25.531 WRIST PAIN, ACUTE, RIGHT: Primary | ICD-10-CM

## 2022-07-05 DIAGNOSIS — T14.90XA INJURY: ICD-10-CM

## 2022-07-05 PROCEDURE — 99283 EMERGENCY DEPT VISIT LOW MDM: CPT | Mod: 25,ER

## 2022-07-05 PROCEDURE — 29125 APPL SHORT ARM SPLINT STATIC: CPT | Mod: RT,ER

## 2022-07-05 RX ORDER — NAPROXEN 500 MG/1
500 TABLET ORAL 2 TIMES DAILY WITH MEALS
Qty: 20 TABLET | Refills: 0 | Status: SHIPPED | OUTPATIENT
Start: 2022-07-05 | End: 2022-07-15

## 2022-07-05 NOTE — DISCHARGE INSTRUCTIONS
I suspect that you have a wrist sprain.  Take medication as prescribed.  Ice elevate and rest.  Use the splint at all times until you are able to see the orthopedist.   No

## 2022-07-05 NOTE — Clinical Note
"Cristina Mendezra" Moisés was seen and treated in our emergency department on 7/5/2022.  She may return to work on 07/08/2022.       If you have any questions or concerns, please don't hesitate to call.      GREGORY Bingham RN    "

## 2022-07-05 NOTE — ED PROVIDER NOTES
Encounter Date: 7/5/2022       History     Chief Complaint   Patient presents with    Wrist Pain     C/o right wrist pain. States was on some slip and slide tarps this yesterday. Mild swelling noted. Able to move digits and bend wrist but is painful. No open wounds or obvious deformity     26-year-old female presents ER for evaluation of right wrist pain.  Patient states that she was on the slip and slide on Saturday and hurt her right wrist.  She reports worsening pain to the palmar aspect of the wrist and notices some mild numbness and tingling to the for 2nd and 3rd digit of the right hand, palmar aspect.  She feels as though her 1st 3 digits are slightly weaker than the rest of her fingers.  She denies any previous injury trauma to the site.  Patient reports that she noticed some mild swelling that has improved.  No redness.  No fevers or chills    The history is provided by the patient.     Review of patient's allergies indicates:   Allergen Reactions    Zithromax [azithromycin]      No past medical history on file.  Past Surgical History:   Procedure Laterality Date    ANTERIOR CRUCIATE LIGAMENT REPAIR      HERNIA REPAIR       Family History   Problem Relation Age of Onset    Breast cancer Neg Hx     Ovarian cancer Neg Hx     Colon cancer Neg Hx      Social History     Tobacco Use    Smoking status: Never Smoker    Smokeless tobacco: Never Used   Substance Use Topics    Alcohol use: Yes     Comment: occas    Drug use: Not Currently     Types: Marijuana     Comment: daily-     Review of Systems   Constitutional: Negative for chills and fever.   Eyes: Negative for visual disturbance.   Respiratory: Negative for shortness of breath.    Cardiovascular: Negative for chest pain.   Gastrointestinal: Negative for nausea and vomiting.   Genitourinary: Negative for dysuria and flank pain.   Musculoskeletal: Negative for myalgias.   Skin: Negative for rash.   Allergic/Immunologic: Negative for  immunocompromised state.   Neurological: Positive for weakness and numbness.   Hematological: Does not bruise/bleed easily.   Psychiatric/Behavioral: Negative for confusion.       Physical Exam     Initial Vitals [07/05/22 1623]   BP Pulse Resp Temp SpO2   (!) 141/64 74 19 99.4 °F (37.4 °C) 99 %      MAP       --         Physical Exam    Vitals reviewed.  Constitutional: She appears well-developed and well-nourished. She is not diaphoretic. No distress.   HENT:   Head: Normocephalic and atraumatic.   Eyes: Conjunctivae and EOM are normal.   Neck: Neck supple.   Pulmonary/Chest: No respiratory distress.   Musculoskeletal:         General: Normal range of motion.      Right wrist: Tenderness (diffuse, worse over the volar aspect, overlying the capal ligament) present. No swelling or deformity. Normal range of motion.      Left wrist: Normal.      Cervical back: Neck supple.     Neurological: She is alert and oriented to person, place, and time. She has normal strength. A sensory deficit (mildy decreased sensation to palmar 1/2/3rd digits of right hand) is present.         ED Course   Procedures  Labs Reviewed - No data to display       Imaging Results          X-Ray Wrist Complete Right (Final result)  Result time 07/05/22 16:36:55    Final result by Bia Ordonez MD (07/05/22 16:36:55)                 Impression:      No acute process seen      Electronically signed by: José Luis Amezquita  Date:    07/05/2022  Time:    16:36             Narrative:    EXAMINATION:  XR WRIST COMPLETE 3 VIEWS RIGHT    CLINICAL HISTORY:  Injury, unspecified, initial encounter    TECHNIQUE:  PA, lateral, and oblique views of the right wrist were performed.    COMPARISON:  None    FINDINGS:  No acute fracture or dislocation.  Soft tissues are unremarkable.  Normal growth plates are seen.                                 Medications - No data to display        APC / Resident Notes:   Patient seen in the ER promptly upon arrival.  She is  afebrile, no acute distress.  Physical examination reveals tenderness on palpation diffusely to the wrist worsening over the bauer aspect of the wrist overlying the carpal ligament.  patient reports slightly decreased sensation on palpation of the plantar aspect of the for 2nd and 3rd digit.  Dorsal sensation fully intact.  No true weakness noted on exam eye exam.  Distal pulses intact and equal bilaterally.    Suspect that patient's symptoms may be secondary to a wrist sprain and possible median nerve injury.  Will place patient into a cock-up wrist splint.  Will advise to ice elevate and rest.  Will give orthopedist follow-up information.  Will also prescribed home on naproxen.  Advised to follow up with physician provided to her.  She was given strict return precautions ED.  Stable for discharge and close follow-up at this time.    Disclaimer: This note has been generated using voice-recognition software. There may be typographical errors that have been missed during proof-reading.                   Clinical Impression:   Final diagnoses:  [T14.90XA] Injury  [M25.531] Wrist pain, acute, right (Primary)          ED Disposition Condition    Discharge Stable        ED Prescriptions     Medication Sig Dispense Start Date End Date Auth. Provider    naproxen (NAPROSYN) 500 MG tablet Take 1 tablet (500 mg total) by mouth 2 (two) times daily with meals. for 10 days 20 tablet 7/5/2022 7/15/2022 Bethany Thakkar PA-C        Follow-up Information     Follow up With Specialties Details Why Contact Info Additional Information    [facility]   [address], [address]  [facility] [address] [phone] Please park in [address] or [address] and use [address] entrance. Take Medical Office Bldg. elevators.           Bethany Thakkar PA-C  07/05/22 5016

## 2022-08-01 ENCOUNTER — PATIENT MESSAGE (OUTPATIENT)
Dept: ORTHOPEDICS | Facility: CLINIC | Age: 27
End: 2022-08-01

## 2022-08-01 ENCOUNTER — OFFICE VISIT (OUTPATIENT)
Dept: ORTHOPEDICS | Facility: CLINIC | Age: 27
End: 2022-08-01
Payer: COMMERCIAL

## 2022-08-01 VITALS — BODY MASS INDEX: 20.61 KG/M2 | HEIGHT: 62 IN | WEIGHT: 112 LBS

## 2022-08-01 DIAGNOSIS — M25.531 WRIST PAIN, ACUTE, RIGHT: ICD-10-CM

## 2022-08-01 PROCEDURE — 99999 PR PBB SHADOW E&M-EST. PATIENT-LVL III: ICD-10-PCS | Mod: PBBFAC,,, | Performed by: ORTHOPAEDIC SURGERY

## 2022-08-01 PROCEDURE — 99203 OFFICE O/P NEW LOW 30 MIN: CPT | Mod: S$GLB,,, | Performed by: ORTHOPAEDIC SURGERY

## 2022-08-01 PROCEDURE — 99203 PR OFFICE/OUTPT VISIT, NEW, LEVL III, 30-44 MIN: ICD-10-PCS | Mod: S$GLB,,, | Performed by: ORTHOPAEDIC SURGERY

## 2022-08-01 PROCEDURE — 99999 PR PBB SHADOW E&M-EST. PATIENT-LVL III: CPT | Mod: PBBFAC,,, | Performed by: ORTHOPAEDIC SURGERY

## 2022-08-01 RX ORDER — SULINDAC 200 MG/1
200 TABLET ORAL 2 TIMES DAILY WITH MEALS
Qty: 30 TABLET | Refills: 1 | Status: SHIPPED | OUTPATIENT
Start: 2022-08-01 | End: 2023-02-14

## 2022-08-01 NOTE — PROGRESS NOTES
"Subjective:      Patient ID: Cristina Curiel is a 26 y.o. female.    Chief Complaint: Consult (R Wrist )      HPI  Cristina Curiel is a  26 y.o. female presenting today for right wrist injury.  There was a history of trauma.  Onset of symptoms began 3 weeks ago when she sustained a fall on her right wrist  She was seen at urgent care noted to have a sprain of her right wrist but she was also having some numbness tingling at that time  She was given a splint which she removed but she continues to have some pain in the wrist  The numbness has improved initially was in the index and middle finger she does notice some continued swelling as well.      Review of patient's allergies indicates:   Allergen Reactions    Zithromax [azithromycin]          Current Outpatient Medications   Medication Sig Dispense Refill    etonogestrel (IMPLANON SDRM) by Subdermal route.      boric acid (BORIC ACID) vaginal suppository Place 1 each (650 mg total) vaginally nightly as needed (vaginitis). (Patient not taking: No sig reported) 30 suppository 5    ondansetron (ZOFRAN) 4 MG tablet Take 1 tablet (4 mg total) by mouth every 12 (twelve) hours as needed for Nausea. (Patient not taking: Reported on 8/1/2022) 12 tablet 0    sulindac (CLINORIL) 200 MG Tab Take 1 tablet (200 mg total) by mouth 2 (two) times daily with meals. 30 tablet 1     No current facility-administered medications for this visit.       History reviewed. No pertinent past medical history.    Past Surgical History:   Procedure Laterality Date    ANTERIOR CRUCIATE LIGAMENT REPAIR      HERNIA REPAIR         Review of Systems:  ROS    OBJECTIVE:     PHYSICAL EXAM:  Height: 5' 2" (157.5 cm) Weight: 50.8 kg (112 lb)  Vitals:    08/01/22 1304   Weight: 50.8 kg (112 lb)   Height: 5' 2" (1.575 m)   PainSc: 0-No pain     Well developed, well nourished female in no acute distress  Alert and oriented x 3  HEENT- Normal exam  Lungs- Clear to auscultation  Heart- Regular rate and " rhythm  Abdomen- Soft nontender  Extremity exam- examination right hand and wrist there is some mild swelling  No bruising no specific areas of tenderness  Range of motion wrist fingers full  Tinel sign mildly positive   strength slightly decreased  She has slight clicking with circumduction of the wrist    RADIOGRAPHS:  AP lateral x-ray of the right wrist shows no fractures or dislocation  Comments: I have personally reviewed the imaging and I agree with the above radiologist's report.    ASSESSMENT/PLAN:     IMPRESSION:  1. Right wrist sprain with possible TFC injury.    2. Moderate irritation of the median nerve right wrist     PLAN:  Think the numbness she is experiencing is related to the median nerve  For treatment I recommended a wrist splint for part-time use  I have also started on Clinoril 200 mg twice a day with food  I do want to keep an eye on the wrist to make sure that the clicking does not persist and if it does we may need to get an MRI scan of the right wrist  Follow-up 3-4 weeks       - We talked at length about the anatomy and pathophysiology of   Encounter Diagnosis   Name Primary?    Wrist pain, acute, right            Disclaimer: This note has been generated using voice-recognition software. There may be typographical errors that have been missed during proof-reading.

## 2022-08-29 ENCOUNTER — OFFICE VISIT (OUTPATIENT)
Dept: ORTHOPEDICS | Facility: CLINIC | Age: 27
End: 2022-08-29
Payer: COMMERCIAL

## 2022-08-29 VITALS — WEIGHT: 112 LBS | HEIGHT: 62 IN | BODY MASS INDEX: 20.61 KG/M2

## 2022-08-29 DIAGNOSIS — M25.531 WRIST PAIN, ACUTE, RIGHT: Primary | ICD-10-CM

## 2022-08-29 PROCEDURE — 99213 PR OFFICE/OUTPT VISIT, EST, LEVL III, 20-29 MIN: ICD-10-PCS | Mod: S$GLB,,, | Performed by: ORTHOPAEDIC SURGERY

## 2022-08-29 PROCEDURE — 99999 PR PBB SHADOW E&M-EST. PATIENT-LVL III: CPT | Mod: PBBFAC,,, | Performed by: ORTHOPAEDIC SURGERY

## 2022-08-29 PROCEDURE — 99999 PR PBB SHADOW E&M-EST. PATIENT-LVL III: ICD-10-PCS | Mod: PBBFAC,,, | Performed by: ORTHOPAEDIC SURGERY

## 2022-08-29 PROCEDURE — 99213 OFFICE O/P EST LOW 20 MIN: CPT | Mod: S$GLB,,, | Performed by: ORTHOPAEDIC SURGERY

## 2022-08-29 RX ORDER — DICLOFENAC SODIUM 10 MG/G
2 GEL TOPICAL 3 TIMES DAILY
Qty: 100 G | Refills: 1 | Status: SHIPPED | OUTPATIENT
Start: 2022-08-29 | End: 2023-02-14

## 2022-08-29 NOTE — PROGRESS NOTES
"Subjective:      Patient ID: Cristina Curiel is a 26 y.o. female.  Chief Complaint: Follow-up (R Wrist )      HPI  Cristina Curiel is a  26 y.o. female presenting today for follow up of right wrist injury.  She reports that she is improved with the right wrist but now having some pain in the right elbow just a little bit of stiffness mainly no numbness or tingling reported.    Review of patient's allergies indicates:   Allergen Reactions    Zithromax [azithromycin]          Current Outpatient Medications   Medication Sig Dispense Refill    etonogestrel (IMPLANON SDRM) by Subdermal route.      sulindac (CLINORIL) 200 MG Tab Take 1 tablet (200 mg total) by mouth 2 (two) times daily with meals. 30 tablet 1    boric acid (BORIC ACID) vaginal suppository Place 1 each (650 mg total) vaginally nightly as needed (vaginitis). (Patient not taking: No sig reported) 30 suppository 5    diclofenac sodium (VOLTAREN) 1 % Gel Apply 2 g topically 3 (three) times daily. 100 g 1    ondansetron (ZOFRAN) 4 MG tablet Take 1 tablet (4 mg total) by mouth every 12 (twelve) hours as needed for Nausea. (Patient not taking: Reported on 8/1/2022) 12 tablet 0     No current facility-administered medications for this visit.       History reviewed. No pertinent past medical history.    Past Surgical History:   Procedure Laterality Date    ANTERIOR CRUCIATE LIGAMENT REPAIR      HERNIA REPAIR         OBJECTIVE:   PHYSICAL EXAM:  Height: 5' 2" (157.5 cm) Weight: 50.8 kg (112 lb)  Vitals:    08/29/22 1114   Weight: 50.8 kg (112 lb)   Height: 5' 2" (1.575 m)   PainSc: 0-No pain     Ortho/SPM Exam  Examination right elbow no tenderness no swelling no bruising   Ran pain on full extension strength intact no instability   Neurologic exam intact  Examination right wrist no tenderness full range of motion wrist good strength ge of motion full   Mild    RADIOGRAPHS:  None  Comments: I have personally reviewed the imaging and I agree with the above radiologist's " report.    ASSESSMENT/PLAN:     IMPRESSION:  1. Right wrist sprain improved.      2. Mild pain right elbow possibly tendinitis    PLAN:  I recommended Advil or Motrin by mouth   Voltaren gel topical for both the wrist and elbow   Squeeze ball for strengthening       FOLLOW UP:  3-4 weeks    Disclaimer: This note has been generated using voice-recognition software. There may be typographical errors that have been missed during proof-reading.

## 2022-09-04 ENCOUNTER — HOSPITAL ENCOUNTER (EMERGENCY)
Facility: HOSPITAL | Age: 27
Discharge: HOME | End: 2022-09-04
Attending: EMERGENCY MEDICINE
Payer: COMMERCIAL

## 2022-09-04 VITALS
HEIGHT: 62 IN | HEART RATE: 76 BPM | RESPIRATION RATE: 16 BRPM | BODY MASS INDEX: 20.8 KG/M2 | OXYGEN SATURATION: 100 % | SYSTOLIC BLOOD PRESSURE: 131 MMHG | WEIGHT: 113 LBS | DIASTOLIC BLOOD PRESSURE: 72 MMHG | TEMPERATURE: 98 F

## 2022-09-04 DIAGNOSIS — S00.83XA CONTUSION OF FOREHEAD, INITIAL ENCOUNTER: ICD-10-CM

## 2022-09-04 DIAGNOSIS — R11.2 NAUSEA AND VOMITING, INTRACTABILITY OF VOMITING NOT SPECIFIED, UNSPECIFIED VOMITING TYPE: Primary | ICD-10-CM

## 2022-09-04 LAB
ALBUMIN SERPL BCP-MCNC: 5.2 G/DL (ref 3.5–5.2)
ALP SERPL-CCNC: 82 U/L (ref 38–126)
ALT SERPL W/O P-5'-P-CCNC: 30 U/L (ref 10–44)
ANION GAP SERPL CALC-SCNC: 14 MMOL/L (ref 8–16)
AST SERPL-CCNC: 38 U/L (ref 15–46)
B-HCG UR QL: NEGATIVE
BACTERIA #/AREA URNS AUTO: NORMAL /HPF
BILIRUB SERPL-MCNC: 0.9 MG/DL (ref 0.1–1)
BILIRUB UR QL STRIP: NEGATIVE
CALCIUM SERPL-MCNC: 9.9 MG/DL (ref 8.7–10.5)
CHLORIDE SERPL-SCNC: 108 MMOL/L (ref 95–110)
CLARITY UR REFRACT.AUTO: CLEAR
CO2 SERPL-SCNC: 26 MMOL/L (ref 23–29)
COLOR UR AUTO: YELLOW
CREAT SERPL-MCNC: 0.7 MG/DL (ref 0.5–1.4)
EST. GFR  (NO RACE VARIABLE): >60 ML/MIN/1.73 M^2
GLUCOSE SERPL-MCNC: 96 MG/DL (ref 70–110)
GLUCOSE UR QL STRIP: NEGATIVE
HGB UR QL STRIP: NEGATIVE
HYALINE CASTS UR QL AUTO: 0 /LPF
KETONES UR QL STRIP: ABNORMAL
LEUKOCYTE ESTERASE UR QL STRIP: NEGATIVE
LIPASE SERPL-CCNC: 55 U/L (ref 23–300)
MICROSCOPIC COMMENT: NORMAL
NITRITE UR QL STRIP: NEGATIVE
PH UR STRIP: 7 [PH] (ref 5–8)
POTASSIUM SERPL-SCNC: 4 MMOL/L (ref 3.5–5.1)
PROT SERPL-MCNC: 9.5 G/DL (ref 6–8.4)
PROT UR QL STRIP: ABNORMAL
RBC #/AREA URNS AUTO: 1 /HPF (ref 0–4)
SODIUM SERPL-SCNC: 148 MMOL/L (ref 136–145)
SP GR UR STRIP: 1.02 (ref 1–1.03)
URN SPEC COLLECT METH UR: ABNORMAL
UROBILINOGEN UR STRIP-ACNC: NEGATIVE EU/DL
UUN UR-MCNC: 8 MG/DL (ref 7–17)
WBC #/AREA URNS AUTO: 1 /HPF (ref 0–5)
WBC CLUMPS UR QL AUTO: NORMAL

## 2022-09-04 PROCEDURE — 81025 URINE PREGNANCY TEST: CPT | Mod: ER | Performed by: EMERGENCY MEDICINE

## 2022-09-04 PROCEDURE — 99285 EMERGENCY DEPT VISIT HI MDM: CPT | Mod: 25,ER

## 2022-09-04 PROCEDURE — 25000003 PHARM REV CODE 250: Mod: ER | Performed by: EMERGENCY MEDICINE

## 2022-09-04 PROCEDURE — 83690 ASSAY OF LIPASE: CPT | Mod: ER | Performed by: EMERGENCY MEDICINE

## 2022-09-04 PROCEDURE — 80053 COMPREHEN METABOLIC PANEL: CPT | Mod: ER | Performed by: EMERGENCY MEDICINE

## 2022-09-04 PROCEDURE — 63600175 PHARM REV CODE 636 W HCPCS: Mod: ER | Performed by: EMERGENCY MEDICINE

## 2022-09-04 PROCEDURE — 96374 THER/PROPH/DIAG INJ IV PUSH: CPT | Mod: ER

## 2022-09-04 PROCEDURE — 81000 URINALYSIS NONAUTO W/SCOPE: CPT | Mod: ER | Performed by: EMERGENCY MEDICINE

## 2022-09-04 RX ORDER — ONDANSETRON 4 MG/1
4 TABLET, ORALLY DISINTEGRATING ORAL EVERY 6 HOURS PRN
Qty: 20 TABLET | Refills: 0 | Status: SHIPPED | OUTPATIENT
Start: 2022-09-04 | End: 2022-11-11 | Stop reason: SDUPTHER

## 2022-09-04 RX ORDER — ONDANSETRON 2 MG/ML
4 INJECTION INTRAMUSCULAR; INTRAVENOUS
Status: COMPLETED | OUTPATIENT
Start: 2022-09-04 | End: 2022-09-04

## 2022-09-04 RX ORDER — SODIUM CHLORIDE 9 MG/ML
INJECTION, SOLUTION INTRAVENOUS
Status: COMPLETED | OUTPATIENT
Start: 2022-09-04 | End: 2022-09-04

## 2022-09-04 RX ADMIN — SODIUM CHLORIDE: 0.9 INJECTION, SOLUTION INTRAVENOUS at 05:09

## 2022-09-04 RX ADMIN — ONDANSETRON 4 MG: 2 INJECTION INTRAMUSCULAR; INTRAVENOUS at 05:09

## 2022-09-04 NOTE — Clinical Note
"Cristina Patel" Moisés was seen and treated in our emergency department on 9/4/2022.  She may return to work on 09/06/2022.       If you have any questions or concerns, please don't hesitate to call.      JOANNE Parker RN    "

## 2022-09-04 NOTE — ED PROVIDER NOTES
"Encounter Date: 9/4/2022       History     Chief Complaint   Patient presents with    Vomiting     Pt reports vomiting since this morning. Pt states she had "a little" of diarrhea. Pt requesting oral fluids, instructed pt she needs to wait be evaluated by doctor. Pt states "Y'all gonna check my head? I fell last night when I was drinking. I had a bruise. I been shivering and sweating."      26-year-old female presents with vomiting today.  Patient says she was out drinking last night.  She reports of fall.  She says her friends told her that she fell from standing.  She complains of pain to the top of her forehead.  She complains of abdominal cramping.  No fever.  No chest pain or shortness of breath.    Review of patient's allergies indicates:   Allergen Reactions    Zithromax [azithromycin]      History reviewed. No pertinent past medical history.  Past Surgical History:   Procedure Laterality Date    ANTERIOR CRUCIATE LIGAMENT REPAIR      HERNIA REPAIR       Family History   Problem Relation Age of Onset    Breast cancer Neg Hx     Ovarian cancer Neg Hx     Colon cancer Neg Hx      Social History     Tobacco Use    Smoking status: Never    Smokeless tobacco: Never   Substance Use Topics    Alcohol use: Yes     Comment: occas    Drug use: Not Currently     Types: Marijuana     Comment: daily-     Review of Systems   Constitutional:  Negative for fever.   Eyes:  Negative for pain.   Respiratory:  Negative for shortness of breath.    Cardiovascular:  Negative for chest pain.   Gastrointestinal:  Positive for abdominal pain, nausea and vomiting.   Genitourinary:  Negative for difficulty urinating.   Musculoskeletal:  Negative for back pain and neck pain.   Neurological:  Positive for headaches.   Psychiatric/Behavioral:  Negative for confusion.      Physical Exam     Initial Vitals [09/04/22 1618]   BP Pulse Resp Temp SpO2   131/72 76 16 98.2 °F (36.8 °C) 100 %      MAP       --         Physical Exam    Nursing note " and vitals reviewed.  HENT:   Mild hematoma to upper forehead   Eyes: Conjunctivae and EOM are normal.   Neck:   Normal range of motion.  Cardiovascular:      Exam reveals no gallop and no friction rub.       No murmur heard.  Pulmonary/Chest: Breath sounds normal. No respiratory distress.   Abdominal: Abdomen is soft. There is no abdominal tenderness.   Musculoskeletal:         General: No edema. Normal range of motion.      Cervical back: Normal range of motion.     Neurological: She is alert and oriented to person, place, and time. She has normal strength. No cranial nerve deficit.   Psychiatric: She has a normal mood and affect.       ED Course   Procedures  Labs Reviewed   URINALYSIS, REFLEX TO URINE CULTURE - Abnormal; Notable for the following components:       Result Value    Protein, UA 2+ (*)     Ketones, UA 2+ (*)     All other components within normal limits    Narrative:     Preferred Collection Type->Urine, Clean Catch  Specimen Source->Urine  Collection Type->Urine, Clean Catch   COMPREHENSIVE METABOLIC PANEL - Abnormal; Notable for the following components:    Sodium 148 (*)     Total Protein 9.5 (*)     All other components within normal limits   PREGNANCY TEST, URINE RAPID    Narrative:     Specimen Source->Urine   LIPASE   URINALYSIS MICROSCOPIC    Narrative:     Preferred Collection Type->Urine, Clean Catch  Specimen Source->Urine  Collection Type->Urine, Clean Catch          Imaging Results              CT Head Without Contrast (Final result)  Result time 09/04/22 17:06:26      Final result by Roman Cheng Jr., MD (09/04/22 17:06:26)                   Impression:      1. No acute intracranial findings.  All CT scans at this facility are performed  using dose modulation techniques as appropriate to performed exam including the following:  automated exposure control; adjustment of mA and/or kV according to the patients size (this includes techniques or standardized protocols for targeted exams  where dose is matched to indication/reason for exam: i.e. extremities or head);  iterative reconstruction technique.      Electronically signed by: Roman Cheng Jr., MD  Date:    09/04/2022  Time:    17:06               Narrative:    EXAMINATION:  CT HEAD WITHOUT CONTRAST    CLINICAL HISTORY:  fall; head trauma.    TECHNIQUE:  CT scan was obtained of the head without administration of contrast.    COMPARISON:  None    FINDINGS:  Ventricles and basal cisterns are normal.  No hemorrhage, mass effect or midline shift.  No cerebral or cerebellar parenchymal abnormality.  Mucosal thickening ethmoid and left maxillary sinuses.  Mastoid air cells are clear.  Calvarium is intact.                                       Medications   ondansetron injection 4 mg (4 mg Intravenous Given 9/4/22 1701)   0.9%  NaCl infusion ( Intravenous New Bag 9/4/22 1702)     Medical Decision Making:   Initial Assessment:   26-year-old female presenting with vomiting today after night of drinking last night.  Had a fall sustaining head injury.  Vital signs unremarkable.  No focal abdominal tenderness.  Small forehead hematoma.  IV fluids given.  Labs unremarkable.  CT head shows no acute intracranial process.  Low suspicion for acute life-threatening illness.  Plan for symptomatic treatment at home and primary care follow-up.  Return instructions given.  Patient verbalized understanding and agreement with plan.                    Clinical Impression:   Final diagnoses:  [R11.2] Nausea and vomiting, intractability of vomiting not specified, unspecified vomiting type (Primary)  [S00.83XA] Contusion of forehead, initial encounter      ED Disposition Condition    Discharge Stable          ED Prescriptions       Medication Sig Dispense Start Date End Date Auth. Provider    ondansetron (ZOFRAN-ODT) 4 MG TbDL Take 1 tablet (4 mg total) by mouth every 6 (six) hours as needed (nausea/vomiting). 20 tablet 9/4/2022 -- Eron Carrillo MD          Follow-up  Information       Follow up With Specialties Details Why Contact Info    Primary care  Schedule an appointment as soon as possible for a visit in 1 week               Eron Carrillo MD  09/04/22 3574

## 2022-11-11 ENCOUNTER — HOSPITAL ENCOUNTER (EMERGENCY)
Facility: HOSPITAL | Age: 27
Discharge: HOME | End: 2022-11-11
Attending: FAMILY MEDICINE
Payer: COMMERCIAL

## 2022-11-11 VITALS
BODY MASS INDEX: 19.75 KG/M2 | TEMPERATURE: 98 F | OXYGEN SATURATION: 99 % | SYSTOLIC BLOOD PRESSURE: 124 MMHG | HEART RATE: 73 BPM | DIASTOLIC BLOOD PRESSURE: 59 MMHG | WEIGHT: 108 LBS | RESPIRATION RATE: 17 BRPM

## 2022-11-11 DIAGNOSIS — K52.9 GASTROENTERITIS: Primary | ICD-10-CM

## 2022-11-11 LAB
INFLUENZA A, MOLECULAR: NEGATIVE
INFLUENZA B, MOLECULAR: NEGATIVE
SARS-COV-2 RDRP RESP QL NAA+PROBE: NEGATIVE
SPECIMEN SOURCE: NORMAL

## 2022-11-11 PROCEDURE — U0002 COVID-19 LAB TEST NON-CDC: HCPCS | Mod: ER | Performed by: FAMILY MEDICINE

## 2022-11-11 PROCEDURE — 99284 EMERGENCY DEPT VISIT MOD MDM: CPT | Mod: ER

## 2022-11-11 PROCEDURE — 25000003 PHARM REV CODE 250: Mod: ER | Performed by: FAMILY MEDICINE

## 2022-11-11 PROCEDURE — 87502 INFLUENZA DNA AMP PROBE: CPT | Mod: ER | Performed by: FAMILY MEDICINE

## 2022-11-11 RX ORDER — ONDANSETRON 4 MG/1
4 TABLET, ORALLY DISINTEGRATING ORAL
Status: COMPLETED | OUTPATIENT
Start: 2022-11-11 | End: 2022-11-11

## 2022-11-11 RX ORDER — DICYCLOMINE HYDROCHLORIDE 10 MG/1
20 CAPSULE ORAL
Status: COMPLETED | OUTPATIENT
Start: 2022-11-11 | End: 2022-11-11

## 2022-11-11 RX ORDER — FAMOTIDINE 20 MG/1
20 TABLET, FILM COATED ORAL
Status: COMPLETED | OUTPATIENT
Start: 2022-11-11 | End: 2022-11-11

## 2022-11-11 RX ORDER — ONDANSETRON 4 MG/1
4 TABLET, ORALLY DISINTEGRATING ORAL EVERY 6 HOURS PRN
Qty: 20 TABLET | Refills: 0 | Status: SHIPPED | OUTPATIENT
Start: 2022-11-11 | End: 2023-02-14

## 2022-11-11 RX ORDER — FAMOTIDINE 20 MG/1
20 TABLET, FILM COATED ORAL 2 TIMES DAILY
Qty: 60 TABLET | Refills: 0 | Status: SHIPPED | OUTPATIENT
Start: 2022-11-11 | End: 2023-02-14

## 2022-11-11 RX ORDER — DICYCLOMINE HYDROCHLORIDE 20 MG/1
20 TABLET ORAL 3 TIMES DAILY PRN
Qty: 30 TABLET | Refills: 0 | Status: SHIPPED | OUTPATIENT
Start: 2022-11-11 | End: 2023-02-14

## 2022-11-11 RX ADMIN — DICYCLOMINE HYDROCHLORIDE 20 MG: 10 CAPSULE ORAL at 09:11

## 2022-11-11 RX ADMIN — FAMOTIDINE 20 MG: 20 TABLET ORAL at 09:11

## 2022-11-11 RX ADMIN — ONDANSETRON 4 MG: 4 TABLET, ORALLY DISINTEGRATING ORAL at 09:11

## 2022-11-11 NOTE — ED PROVIDER NOTES
Encounter Date: 11/11/2022       History     Chief Complaint   Patient presents with    nausea and vomiting     Pt states she has nausea and vomiting since Wednesday. Denies fever     26-year-old female complains of nausea, 1 episode of vomiting and diarrhea.  No fever chills or rigors.  Claims 1 of the family member has flu.  She denies nasal congestion or cough.    The history is provided by the patient.   Review of patient's allergies indicates:   Allergen Reactions    Zithromax [azithromycin]      No past medical history on file.  Past Surgical History:   Procedure Laterality Date    ANTERIOR CRUCIATE LIGAMENT REPAIR      HERNIA REPAIR       Family History   Problem Relation Age of Onset    Breast cancer Neg Hx     Ovarian cancer Neg Hx     Colon cancer Neg Hx      Social History     Tobacco Use    Smoking status: Never    Smokeless tobacco: Never   Substance Use Topics    Alcohol use: Yes     Comment: occas    Drug use: Not Currently     Types: Marijuana     Comment: daily-     Review of Systems   Constitutional:  Negative for fever.   HENT:  Negative for sore throat.    Respiratory:  Negative for shortness of breath.    Cardiovascular:  Negative for chest pain.   Gastrointestinal:  Positive for diarrhea, nausea and vomiting.   Genitourinary:  Negative for dysuria.   Musculoskeletal:  Negative for back pain.   Skin:  Negative for rash.   Neurological:  Positive for headaches. Negative for weakness.   Hematological:  Does not bruise/bleed easily.   All other systems reviewed and are negative.    Physical Exam     Initial Vitals [11/11/22 0821]   BP Pulse Resp Temp SpO2   (!) 124/59 73 17 97.7 °F (36.5 °C) 99 %      MAP       --         Physical Exam    Nursing note and vitals reviewed.  Constitutional: Vital signs are normal. She appears well-developed and well-nourished. She is active. No distress.   HENT:   Head: Normocephalic.   Nose: Nose normal.   Mouth/Throat: Oropharynx is clear and moist and mucous  membranes are normal.   Eyes: Conjunctivae, EOM and lids are normal.   Neck: Neck supple.   Normal range of motion.  Cardiovascular:  Normal rate, regular rhythm, S1 normal, S2 normal and normal heart sounds.           Pulmonary/Chest: Breath sounds normal. No respiratory distress. She has no wheezes. She has no rales.   Abdominal: Abdomen is soft. Bowel sounds are normal. She exhibits no distension. There is no abdominal tenderness. There is no rebound and no guarding.   Musculoskeletal:      Right upper arm: Normal.      Left upper arm: Normal.      Cervical back: Normal range of motion and neck supple.      Right lower leg: Normal.      Left lower leg: Normal.     Neurological: She is alert and oriented to person, place, and time. She has normal strength. GCS score is 15. GCS eye subscore is 4. GCS verbal subscore is 5. GCS motor subscore is 6.   Skin: Skin is warm. Capillary refill takes less than 2 seconds.   Psychiatric: She has a normal mood and affect. Her speech is normal and behavior is normal. Thought content normal. Cognition and memory are normal.       ED Course   Procedures  Labs Reviewed   INFLUENZA A & B BY MOLECULAR   SARS-COV-2 RNA AMPLIFICATION, QUAL    Narrative:     Is the patient symptomatic?->Yes          Imaging Results    None          Medications   ondansetron disintegrating tablet 4 mg (has no administration in time range)   dicyclomine capsule 20 mg (has no administration in time range)   famotidine tablet 20 mg (has no administration in time range)     Medical Decision Making:   Clinical Tests:   Lab Tests: Ordered and Reviewed       <> Summary of Lab: Negative for flu and COVID.  ED Management:  Possible gastroenteritis.  Treated with Zofran, Bentyl and Pepcid.  Along with prescription.  Follow-up ED with any worsening symptoms.                        Clinical Impression:   Final diagnoses:  [K52.9] Gastroenteritis (Primary)      ED Disposition Condition    Discharge Stable          ED  Prescriptions       Medication Sig Dispense Start Date End Date Auth. Provider    ondansetron (ZOFRAN-ODT) 4 MG TbDL Take 1 tablet (4 mg total) by mouth every 6 (six) hours as needed (nausea/vomiting). 20 tablet 11/11/2022 -- Zackery Monterroso MD    famotidine (PEPCID) 20 MG tablet Take 1 tablet (20 mg total) by mouth 2 (two) times daily. 60 tablet 11/11/2022 -- Zackery Monterroso MD    dicyclomine (BENTYL) 20 mg tablet Take 1 tablet (20 mg total) by mouth 3 (three) times daily as needed (abdominal pain). 30 tablet 11/11/2022 -- Zackery Monterroso MD          Follow-up Information       Follow up With Specialties Details Why Contact Info    Primarycare physician  In 2 days F/U              Zackery Monterroso MD  11/11/22 3572

## 2022-11-11 NOTE — Clinical Note
"Cristina Mendezalpesh Curiel was seen and treated in our emergency department on 11/11/2022.  She may return to work on 11/14/2022.       If you have any questions or concerns, please don't hesitate to call.      Zackery Monterroso MD"

## 2022-11-15 ENCOUNTER — TELEPHONE (OUTPATIENT)
Dept: ORTHOPEDICS | Facility: CLINIC | Age: 27
End: 2022-11-15
Payer: COMMERCIAL

## 2022-11-15 NOTE — TELEPHONE ENCOUNTER
----- Message from Catherine Peña sent at 11/15/2022 10:54 AM CST -----  Type:  Needs Medical Advice    Who Called: PT  Symptoms (please be specific):  PT HAS STICHES ON ACL & MENICUS TEAR - WEATHER LOCKING - FROM PROCEDURE DONE MANY YRS AGO AT [facility]  How long has patient had these symptoms:    Pharmacy name and phone #:    Would the patient rather a call back or a response via [facility]? CALL  Best Call Back Number: [phone]  Additional Information: NEEDS ADVICE TO GET SOME RELIEF

## 2022-11-18 ENCOUNTER — HOSPITAL ENCOUNTER (OUTPATIENT)
Dept: RADIOLOGY | Facility: HOSPITAL | Age: 27
Discharge: HOME | End: 2022-11-18
Attending: ORTHOPAEDIC SURGERY
Payer: COMMERCIAL

## 2022-11-18 ENCOUNTER — OFFICE VISIT (OUTPATIENT)
Dept: ORTHOPEDICS | Facility: CLINIC | Age: 27
End: 2022-11-18
Payer: COMMERCIAL

## 2022-11-18 VITALS — HEIGHT: 62 IN | BODY MASS INDEX: 20.24 KG/M2 | WEIGHT: 110 LBS

## 2022-11-18 DIAGNOSIS — M25.569 CHRONIC KNEE PAIN, UNSPECIFIED LATERALITY: ICD-10-CM

## 2022-11-18 DIAGNOSIS — S83.511S RUPTURE OF ANTERIOR CRUCIATE LIGAMENT OF RIGHT KNEE, SEQUELA: ICD-10-CM

## 2022-11-18 DIAGNOSIS — G89.29 CHRONIC KNEE PAIN, UNSPECIFIED LATERALITY: Primary | ICD-10-CM

## 2022-11-18 DIAGNOSIS — M22.2X1 RIGHT PATELLOFEMORAL SYNDROME: Primary | ICD-10-CM

## 2022-11-18 DIAGNOSIS — G89.29 CHRONIC KNEE PAIN, UNSPECIFIED LATERALITY: ICD-10-CM

## 2022-11-18 DIAGNOSIS — M25.569 CHRONIC KNEE PAIN, UNSPECIFIED LATERALITY: Primary | ICD-10-CM

## 2022-11-18 PROCEDURE — 73564 X-RAY EXAM KNEE 4 OR MORE: CPT | Mod: TC,50,PN

## 2022-11-18 PROCEDURE — 99999 PR PBB SHADOW E&M-EST. PATIENT-LVL III: CPT | Mod: PBBFAC,,, | Performed by: ORTHOPAEDIC SURGERY

## 2022-11-18 PROCEDURE — 99999 PR PBB SHADOW E&M-EST. PATIENT-LVL III: ICD-10-PCS | Mod: PBBFAC,,, | Performed by: ORTHOPAEDIC SURGERY

## 2022-11-18 PROCEDURE — 73564 XR KNEE ORTHO BILAT WITH FLEXION: ICD-10-PCS | Mod: 26,50,, | Performed by: RADIOLOGY

## 2022-11-18 PROCEDURE — 99203 PR OFFICE/OUTPT VISIT, NEW, LEVL III, 30-44 MIN: ICD-10-PCS | Mod: S$GLB,,, | Performed by: ORTHOPAEDIC SURGERY

## 2022-11-18 PROCEDURE — 73564 X-RAY EXAM KNEE 4 OR MORE: CPT | Mod: 26,50,, | Performed by: RADIOLOGY

## 2022-11-18 PROCEDURE — 99203 OFFICE O/P NEW LOW 30 MIN: CPT | Mod: S$GLB,,, | Performed by: ORTHOPAEDIC SURGERY

## 2022-11-18 NOTE — PROGRESS NOTES
Subjective:      Patient ID: Cristina Curiel is a 26 y.o. female.    Chief Complaint: Consult of the Right Knee    HPI     They have experienced problems with their right knee over the past several months. The patient reports relevant history of injury/aggravation.  The patient had a history of ACL reconstruction for a sports injury years ago.  No recent injury.  Pain is located  anteriorly Associated symptoms include pseudolocking.  Symptoms are aggravated by squatting at work. They have been treated with nothing recently.   Symptoms have recently stayed the same. Ambulation reportedly has not been impaired. Self care ADLs are not painful.     Review of Systems   Constitutional: Negative for fever and weight loss.   HENT:  Negative for congestion.    Eyes:  Negative for visual disturbance.   Cardiovascular:  Negative for chest pain.   Respiratory:  Negative for shortness of breath.    Hematologic/Lymphatic: Negative for bleeding problem. Does not bruise/bleed easily.   Skin:  Negative for poor wound healing.   Musculoskeletal:  Positive for joint pain.   Gastrointestinal:  Negative for abdominal pain.   Genitourinary:  Negative for dysuria.   Neurological:  Negative for seizures.   Psychiatric/Behavioral:  Negative for altered mental status.    Allergic/Immunologic: Negative for persistent infections.       Objective:      Ortho/SPM Exam      Right knee    The patient is not in acute distress.   Body habitus is normal.   Sclera appear normal  No respiratory distress  The patient walks without a limp.  Resisted SLR negative.   The skin over the knee is intact.  Healed portals noted  Knee effusion 0  Tendernes is located absent.  Range of motion- Flexion full, Extension full.   Ligament exam:   MCL intact   Lachman equivocal              Post sag intact    LCL intact  Patellar apprehension negative.  Popliteal cyst negative  Patellar crepitation present  Flexion/pinch negative.  Pulses DP present, PT present.  Motor  normal 5/5 strength in all tested muscle groups.   Sensory normal.    I reviewed the relevant imaging for the patient's condition:  Right knee radiographs show no fracture or dislocation.  Postsurgical changes suggestive of ACL reconstruction or present.  Joint space is maintained      Assessment:       Encounter Diagnoses   Name Primary?    Right patellofemoral syndrome Yes    Rupture of anterior cruciate ligament of right knee, sequela    Although it is possible that the ACL graft has stretched over time, current symptoms are most suggestive of patellofemoral etiology         Plan:       Cristina was seen today for consult.    Diagnoses and all orders for this visit:    Right patellofemoral syndrome    Rupture of anterior cruciate ligament of right knee, sequela  I explained my diagnostic impression and the reasoning behind it in detail, using layman's terms.  Models and/or pictures were used to help in the explanation.    J brace    Physical therapy

## 2022-12-01 ENCOUNTER — CLINICAL SUPPORT (OUTPATIENT)
Dept: REHABILITATION | Facility: HOSPITAL | Age: 27
End: 2022-12-01
Attending: ORTHOPAEDIC SURGERY
Payer: COMMERCIAL

## 2022-12-01 DIAGNOSIS — S83.511S RUPTURE OF ANTERIOR CRUCIATE LIGAMENT OF RIGHT KNEE, SEQUELA: ICD-10-CM

## 2022-12-01 DIAGNOSIS — R29.898 WEAKNESS OF BOTH LOWER EXTREMITIES: ICD-10-CM

## 2022-12-01 DIAGNOSIS — R26.9 GAIT ABNORMALITY: ICD-10-CM

## 2022-12-01 DIAGNOSIS — M22.2X1 RIGHT PATELLOFEMORAL SYNDROME: ICD-10-CM

## 2022-12-01 PROCEDURE — 97110 THERAPEUTIC EXERCISES: CPT | Mod: PO

## 2022-12-01 PROCEDURE — 97161 PT EVAL LOW COMPLEX 20 MIN: CPT | Mod: PO

## 2022-12-02 PROBLEM — R29.898 WEAKNESS OF BOTH LOWER EXTREMITIES: Status: ACTIVE | Noted: 2022-12-02

## 2022-12-02 PROBLEM — R26.9 GAIT ABNORMALITY: Status: ACTIVE | Noted: 2022-12-02

## 2022-12-02 NOTE — PLAN OF CARE
[facility]   Physical Therapy Initial Evaluation     Date: 12/1/2022   Name: Cristina Curiel  Clinic Number: [id number]    Therapy Diagnosis:   Encounter Diagnoses   Name Primary?    Right patellofemoral syndrome     Rupture of anterior cruciate ligament of right knee, sequela     Gait abnormality     Weakness of both lower extremities      Physician: Keith Bella MD    Physician Orders: PT Eval and Treat: Emphasize quad strengthening and proprioception  Medical Diagnosis from Referral:   M22.2X1 (ICD-10-CM) - Right patellofemoral syndrome   S83.511S (ICD-10-CM) - Rupture of anterior cruciate ligament of right knee, sequela     Evaluation Date: 12/1/2022  Authorization Period Expiration: 11/18/2023  Plan of Care Expiration: 1/13/2023  Progress Note Due: 1/3/2023  Visit # / Visits authorized: 1/ 1   FOTO: 0/5    Precautions: Standard     Time In: 5:07 pm  Time Out: 6:00 pm  Total Appointment Time (timed & untimed codes): 53 minutes      SUBJECTIVE     Date of onset: October 2022    History of current condition - Cristina reports: My R knee has been bothering me because of the cold weather. I recently just passed my test to be a  and do not want any problems because of my knee. Every winter I get these problems, but it is bothering me more this year because I am outside in the cold air more. I am having some pain today because it is cold outside.    Falls: None reported    Imaging,     X-ray Bilateral Knee:  FINDINGS:  Sequela of prior right ACL reconstruction.  Hardware projects in appropriate alignment.  Bilateral knee joint cartilage spaces appear maintained.  No acute fracture, dislocation, or osseous destruction.    Prior Therapy: Yes, it helped but pt feels as if   Social History: lives with their family  Occupation: Firewoman  Prior Level of Function: Independent  Current Level of Function: Independent    Pain:  Current 2/10, worst 5/10, best 0/10   Location: R  knee  Description: Aching  Aggravating Factors: Walking  Easing Factors: hot bath, hot shower with epsom salt    Patients goals: be able to perform job responsibilities     Medical History:   No past medical history on file.    Surgical History:   Cristina Curiel  has a past surgical history that includes Hernia repair and Anterior cruciate ligament repair.    Medications:   Cristina has a current medication list which includes the following prescription(s): boric acid, diclofenac sodium, dicyclomine, etonogestrel, famotidine, ondansetron, and sulindac.    Allergies:   Review of patient's allergies indicates:   Allergen Reactions    Zithromax [azithromycin]         OBJECTIVE     Observation: Pt is a 27 yo F presenting to therapy in no apparent distress. Pt has to bend down on one knee to perform work tasks. Tends to use R knee to bend down. Was prescribed a knee brace, but does not use it.    Gait: lateral leaning onto RLE     Range of Motion:   Knee Left active Left Passive Right Active R passive   Flexion 135  135   Extension 0 NT -2 0       Lower Extremity Strength  Right LE  Left LE    Knee extension: 4-/5 Knee extension: 4+/5   Knee flexion: 4/5 Knee flexion: 4+/5   Hip flexion: 3+/5 Hip flexion: 4-/5   Hip extension:  3/5 Hip extension: 3+/5   Hip abduction: 4+/5 Hip abduction: 4/5   Hip adduction: 4+/5 Hip adduction 4+/5   Ankle dorsiflexion: 5/5 Ankle dorsiflexion: 5/5   Ankle plantarflexion: 3/5 Ankle plantarflexion: 3/5       Joint Mobility:  some hypomobility with patellar mobs superior and inferior more    Palpation: no TTP      Limitation/Restriction for FOTO Knee Survey    Therapist reviewed FOTO scores for Cristina Curiel on 12/1/2022.   FOTO documents entered into [employer] - see Media section.    Limitation Score: 91%         TREATMENT     Total Treatment time (time-based codes) separate from Evaluation: 15 minutes      Cristina received the treatments listed below:      Instruction on HEP:      therapeutic  exercises to develop strength, endurance, and ROM for 15 minutes including:    Date 12/1/2022   POC exp 1/12/2023   PN 1/3/2023   FTF 1/3/2023   FOTO 0/5           Bike    TM    HS str    Gastroc Str.        QS    SAQ    SLR    SL Abd    Prone quad str    Corby Hip Add    LAQs        Shuttle  Leg Press    TKE    Hip Abd    Hip Flex    Hip Ext    HS Curls        Step Ups    Step Downs    Seen by: EG          PATIENT EDUCATION AND HOME EXERCISES     Education provided:   - importance of consistent performance of HEP  - role of PT/PTA    Written Home Exercises Provided: yes. Exercises were reviewed and Cristina was able to demonstrate them prior to the end of the session.  Cristina demonstrated good  understanding of the education provided. See EMR under Patient Instructions for exercises provided during therapy sessions.    ASSESSMENT     Cristina is a 26 y.o. female referred to outpatient Physical Therapy with a medical diagnosis of R PFS and s/p R ACL repair sequela. Patient presents with minimal pain in her R knee that gets to moderate levels of pain at the worst. She displays some gait abnormalities with increased lateral leaning towards the R, BLE weakness R>L and some minimal ROM deficits of the R knee. She expresses that she has increasing concern about her R knee deficits due to her change in job to be a firewoman. She explains that this job is very physically demanding and she has been experiencing increased pain provocation with the lower temperatures and having to remain outdoors for longer periods of time. Alternative movement patterns to avoid overly stressing R knee are suggested.    Patient prognosis is Excellent.   Patient will benefit from skilled outpatient Physical Therapy to address the deficits stated above and in the chart below, provide patient /family education, and to maximize patient's level of independence.     Plan of care discussed with patient: Yes  Patient's spiritual, cultural and educational  needs considered and patient is agreeable to the plan of care and goals as stated below:     Anticipated Barriers for therapy: None    Medical Necessity is demonstrated by the following  History  Co-morbidities and personal factors that may impact the plan of care Co-morbidities:   None    Personal Factors:   no deficits     low   Examination  Body Structures and Functions, activity limitations and participation restrictions that may impact the plan of care Body Regions:   lower extremities    Body Systems:    ROM  Strength  Gait    Participation Restrictions:   None    Activity limitations:   Learning and applying knowledge  no deficits    General Tasks and Commands  no deficits    Communication  no deficits    Mobility  lifting and carrying objects  moving around using equipment (WC)  using transportation (bus, train, plane, car)  driving (bike, car, motorcycle)    Self care  no deficits    Domestic Life  shopping  cooking  doing house work (cleaning house, washing dishes, laundry)    Interactions/Relationships  no deficits    Life Areas  no deficits    Community and Social Life  community life  recreation and leisure         low   Clinical Presentation stable and uncomplicated low   Decision Making/ Complexity Score: low     Goals:  Short Term Goals: (4 weeks)  Patient will be compliant with HEP to promote the independent management of current diagnosis. In progress, not met  Patient will increase strength of BLE > or =  to 4+/5 to improve ability to perform tasks for her job. In progress, not met  Patient will increase active R knee flexion to 135 degrees to have equivalent ROM as L knee as well as improved performance of activities for her job. In progress, not met  Patient will report a decrease in R knee pain to 3/10 at the worst during ADL's. In progress, not met    Long Term Goals: (6 weeks)  Patient will increase strength of RLE musculature to 5/5 to improve stability while ambulating over uneven surfaces  as well as ability to perform tasks for her job. In progress, not met  Patient will report a decrease in complaints of R knee pain to 1/10 at the worst during ADL's. In progress, not met  Patient will improve FOTO limitation status from 91% to 99% placing the patient in the 1% impaired, limited, or restricted category indicating increased functional mobility. In progress, not met     PLAN   Plan of care Certification: 12/1/2022 to 1/12/2023.    Outpatient Physical Therapy 1 times weekly for 6 weeks to include the following interventions: Electrical Stimulation NMES, Gait Training, Manual Therapy, Moist Heat/ Ice, Neuromuscular Re-ed, Patient Education, Therapeutic Activities, and Therapeutic Exercise.     Jessika Ayoub, PT      I CERTIFY THE NEED FOR THESE SERVICES FURNISHED UNDER THIS PLAN OF TREATMENT AND WHILE UNDER MY CARE   Physician's comments:     Physician's Signature: ___________________________________________________

## 2023-01-20 ENCOUNTER — TELEPHONE (OUTPATIENT)
Dept: ORTHOPEDICS | Facility: CLINIC | Age: 28
End: 2023-01-20
Payer: COMMERCIAL

## 2023-01-20 NOTE — TELEPHONE ENCOUNTER
Called patient to get her appointment rescheduled with Ramirez Helton PA-C. Pt has a voicemail box that has not been set up.

## 2023-01-31 ENCOUNTER — HOSPITAL ENCOUNTER (EMERGENCY)
Facility: HOSPITAL | Age: 28
Discharge: HOME | End: 2023-01-31
Attending: EMERGENCY MEDICINE
Payer: COMMERCIAL

## 2023-01-31 VITALS
DIASTOLIC BLOOD PRESSURE: 79 MMHG | HEART RATE: 79 BPM | OXYGEN SATURATION: 98 % | RESPIRATION RATE: 17 BRPM | BODY MASS INDEX: 21.35 KG/M2 | HEIGHT: 62 IN | SYSTOLIC BLOOD PRESSURE: 129 MMHG | WEIGHT: 116 LBS | TEMPERATURE: 99 F

## 2023-01-31 DIAGNOSIS — B34.9 ACUTE VIRAL SYNDROME: Primary | ICD-10-CM

## 2023-01-31 LAB
GROUP A STREP, MOLECULAR: NEGATIVE
INFLUENZA A, MOLECULAR: NEGATIVE
INFLUENZA B, MOLECULAR: NEGATIVE
SARS-COV-2 RDRP RESP QL NAA+PROBE: NEGATIVE
SPECIMEN SOURCE: NORMAL

## 2023-01-31 PROCEDURE — 99284 EMERGENCY DEPT VISIT MOD MDM: CPT | Mod: ER

## 2023-01-31 PROCEDURE — 87502 INFLUENZA DNA AMP PROBE: CPT | Mod: ER | Performed by: EMERGENCY MEDICINE

## 2023-01-31 PROCEDURE — 96372 THER/PROPH/DIAG INJ SC/IM: CPT | Performed by: EMERGENCY MEDICINE

## 2023-01-31 PROCEDURE — 87651 STREP A DNA AMP PROBE: CPT | Mod: ER | Performed by: EMERGENCY MEDICINE

## 2023-01-31 PROCEDURE — U0002 COVID-19 LAB TEST NON-CDC: HCPCS | Mod: ER | Performed by: EMERGENCY MEDICINE

## 2023-01-31 PROCEDURE — 63600175 PHARM REV CODE 636 W HCPCS: Mod: ER | Performed by: EMERGENCY MEDICINE

## 2023-01-31 RX ORDER — DEXAMETHASONE SODIUM PHOSPHATE 4 MG/ML
8 INJECTION, SOLUTION INTRA-ARTICULAR; INTRALESIONAL; INTRAMUSCULAR; INTRAVENOUS; SOFT TISSUE
Status: COMPLETED | OUTPATIENT
Start: 2023-01-31 | End: 2023-01-31

## 2023-01-31 RX ADMIN — DEXAMETHASONE SODIUM PHOSPHATE 8 MG: 4 INJECTION, SOLUTION INTRA-ARTICULAR; INTRALESIONAL; INTRAMUSCULAR; INTRAVENOUS; SOFT TISSUE at 09:01

## 2023-01-31 NOTE — Clinical Note
"Cristina Mendezalpesh Curiel was seen and treated in our emergency department on 1/31/2023.  She may return to work on 02/01/2023.       If you have any questions or concerns, please don't hesitate to call.      Dora Will RN    "

## 2023-01-31 NOTE — ED PROVIDER NOTES
Encounter Date: 1/31/2023       History     Chief Complaint   Patient presents with    Influenza     Pt C/O flu like symptoms X 2 days     27-year-old female presenting with 2 days of runny nose, congestion, headache, body aches.  No vomiting, diarrhea, fever.  No sick contacts.    Review of patient's allergies indicates:   Allergen Reactions    Zithromax [azithromycin]      No past medical history on file.  Past Surgical History:   Procedure Laterality Date    ANTERIOR CRUCIATE LIGAMENT REPAIR      HERNIA REPAIR       Family History   Problem Relation Age of Onset    Breast cancer Neg Hx     Ovarian cancer Neg Hx     Colon cancer Neg Hx      Social History     Tobacco Use    Smoking status: Never    Smokeless tobacco: Never   Substance Use Topics    Alcohol use: Yes     Comment: occas    Drug use: Not Currently     Types: Marijuana     Comment: daily-     Review of Systems   Constitutional:  Negative for fever.   HENT:  Positive for congestion and rhinorrhea. Negative for sore throat.    Respiratory:  Positive for cough.    Musculoskeletal:  Positive for myalgias.   Neurological:  Positive for headaches.     Physical Exam     Initial Vitals [01/31/23 0727]   BP Pulse Resp Temp SpO2   129/79 79 17 99.3 °F (37.4 °C) 98 %      MAP       --         Physical Exam    Nursing note and vitals reviewed.  HENT:   Head: Atraumatic.   Right Ear: External ear normal.   Left Ear: External ear normal.   Mouth/Throat: Oropharynx is clear and moist.   Eyes: Conjunctivae and EOM are normal.   Neck: Neck supple.   Normal range of motion.  Pulmonary/Chest: Breath sounds normal. No respiratory distress.   Musculoskeletal:      Cervical back: Normal range of motion and neck supple.     Neurological: She is alert and oriented to person, place, and time.       ED Course   Procedures  Labs Reviewed   GROUP A STREP, MOLECULAR   INFLUENZA A & B BY MOLECULAR   SARS-COV-2 RNA AMPLIFICATION, QUAL    Narrative:     Is the patient  symptomatic?->Yes          Imaging Results    None          Medications   dexAMETHasone injection 8 mg (has no administration in time range)     Medical Decision Making:   Initial Assessment:   27-year-old female with URI symptoms and cough for 2 days.  Appears well on exam.  Neck is supple.  Doubt meningitis.  Oropharynx is clear without evidence of peritonsillar abscess.  Lungs are clear.  COVID, flu and strep swabs are negative.  Suspect viral illness.  Plan for symptomatic treatment and primary care follow-up.                        Clinical Impression:   Final diagnoses:  [B34.9] Acute viral syndrome (Primary)        ED Disposition Condition    Discharge Stable          ED Prescriptions    None       Follow-up Information       Follow up With Specialties Details Why Contact Info    Primary care  Schedule an appointment as soon as possible for a visit in 3 days               Eron Carrillo MD  01/31/23 [id number]

## 2023-02-07 ENCOUNTER — TELEPHONE (OUTPATIENT)
Dept: OBSTETRICS AND GYNECOLOGY | Facility: CLINIC | Age: 28
End: 2023-02-07
Payer: COMMERCIAL

## 2023-02-07 DIAGNOSIS — Z30.9 ENCOUNTER FOR CONTRACEPTIVE MANAGEMENT, UNSPECIFIED TYPE: Primary | ICD-10-CM

## 2023-02-14 ENCOUNTER — PROCEDURE VISIT (OUTPATIENT)
Dept: OBSTETRICS AND GYNECOLOGY | Facility: CLINIC | Age: 28
End: 2023-02-14
Payer: COMMERCIAL

## 2023-02-14 VITALS
HEIGHT: 62 IN | BODY MASS INDEX: 21.3 KG/M2 | SYSTOLIC BLOOD PRESSURE: 120 MMHG | DIASTOLIC BLOOD PRESSURE: 72 MMHG | WEIGHT: 115.75 LBS

## 2023-02-14 DIAGNOSIS — Z30.46 ENCOUNTER FOR REMOVAL AND REINSERTION OF NEXPLANON: Primary | ICD-10-CM

## 2023-02-14 LAB
B-HCG UR QL: NEGATIVE
CTP QC/QA: YES

## 2023-02-14 PROCEDURE — 11983 PR REMOVAL W/ REINSERT DRUG IMPLANT DEVICE: ICD-10-PCS | Mod: S$GLB,,, | Performed by: NURSE PRACTITIONER

## 2023-02-14 PROCEDURE — 81025 POCT URINE PREGNANCY: ICD-10-PCS | Mod: S$GLB,,, | Performed by: NURSE PRACTITIONER

## 2023-02-14 PROCEDURE — 11983 REMOVE/INSERT DRUG IMPLANT: CPT | Mod: S$GLB,,, | Performed by: NURSE PRACTITIONER

## 2023-02-14 PROCEDURE — 99499 NO LOS: ICD-10-PCS | Mod: S$GLB,,, | Performed by: NURSE PRACTITIONER

## 2023-02-14 PROCEDURE — 81025 URINE PREGNANCY TEST: CPT | Mod: S$GLB,,, | Performed by: NURSE PRACTITIONER

## 2023-02-14 PROCEDURE — 99499 UNLISTED E&M SERVICE: CPT | Mod: S$GLB,,, | Performed by: NURSE PRACTITIONER

## 2023-02-14 NOTE — PROCEDURES
Procedures  CC: NEXPLANON REMOVAL    Cristina Curiel is a 27 y.o. female  presents for Nexplanon removal.    Vitals:    23 1355   BP: 120/72       UPT negative.    Nexplanon palpated through the skin.  Area wiped with rubbing alcohol.  3 cc of 1% lidocaine without epinephrine was injected in the subcutaneous tissue.  The area was prepped with betadine.  A stabbing incision was made with an 11 blade scalpel.  The Nexplanon was identified and grasped with curved hemostat.  It was removed intact.  A bandage was placed over the incision site. Pt tolerated the procedure well.

## 2023-02-14 NOTE — PROCEDURES
CC: NEXPLANON INSERTION      PRE-NEXPLANON INSERTION COUNSELING:  All contraceptive options were reviewed and the patient chooses Nexplanon.  Patients history was reviewed and there were no contraindications to Nexplanon.  The procedure and minimal risks of pain, bleeding, bruising and infection at the insertion site discussed. Possible irregular menstrual bleeding pattern versus amenorrhea was explained.  No protection against STDs discussed.  Written information provided; all questions answered and patient agrees to proceed.  Consent signed and scanned into computer.  NEXPLANON LOT #[id number],  EXPIRATION 10/24/2024    Vitals:    02/14/23 1355   BP: 120/72       EXAM:  With patient in supine position the nondominant arm was flexed at the elbow and externally rotated.  The insertion site was identified 6-8 cm above the elbow crease at the inner side of the upper arm overlying the groove between biceps and triceps.  The insertion site was marked and a second simone was placed 6-8 cm above the first.    PROCEDURE:  TIME OUT PERFORMED.  The insertion site was prepped with antiseptic and injected with 2 cc of 1% Xylocaine without epinephrine subq along the planned insertion canal.  Using sterile technique the Nexplanon applicator was visually verified and removed from the blister pack.  The Transparent Protection Cap was removed by sliding it horizontally in the direction of the arrow away from the needle.  The white colored implant was visualized by looking into the tip of the needle.   The needle tip was inserted bevel side up at a 30 degree angle to penetrate the skin.  The applicator was lowered parallel to the arm and the skin was tented with the needle.  While lifting skin with the needle, the needle was inserted to its full length.  Keeping the applicator in place, the purple slider was unlocked by pushing it slightly down.  The slider was then moved fully back until it stopped.  The applicator was then removed.  The Needle was noted to be fully retracted and only the purple tip of the obturator was visible.  The implant was palpable beneath the skin after insertion.  A small adhesive bandage and then a pressure bandage was placed over the insertion site.  The patient tolerated the procedure well.    ASSESSMENT:  1. Contraception management / Nexplanon insertion.V25.0.    POST NEXPLANON INSERTION COUNSELING:  Manage post nexplanon placement arm pain with NSAIDs, Tylenol or Rx per MedCard.  Keep arm elevated and apply intermittent ice packs to decrease pain and bruising for 24 Hours.  May remove bandage in 24 hours.  Nexplanon danger signs (worsening pain at insertion site, bleeding through bandage, redness and/or pus drainage at insertion site).  Removal in 3 years.    Counseling lasted approximately 15 minutes and all her questions were answered.    FOLLOW-UP: with me in two weeks.

## 2023-03-01 ENCOUNTER — PATIENT MESSAGE (OUTPATIENT)
Dept: OBSTETRICS AND GYNECOLOGY | Facility: CLINIC | Age: 28
End: 2023-03-01
Payer: COMMERCIAL

## 2023-03-01 ENCOUNTER — TELEPHONE (OUTPATIENT)
Dept: OBSTETRICS AND GYNECOLOGY | Facility: CLINIC | Age: 28
End: 2023-03-01
Payer: COMMERCIAL

## 2023-05-20 ENCOUNTER — HOSPITAL ENCOUNTER (EMERGENCY)
Facility: HOSPITAL | Age: 28
Discharge: HOME | End: 2023-05-20
Attending: FAMILY MEDICINE
Payer: COMMERCIAL

## 2023-05-20 VITALS
BODY MASS INDEX: 21.62 KG/M2 | OXYGEN SATURATION: 100 % | WEIGHT: 122 LBS | HEIGHT: 63 IN | DIASTOLIC BLOOD PRESSURE: 72 MMHG | SYSTOLIC BLOOD PRESSURE: 127 MMHG | TEMPERATURE: 99 F | RESPIRATION RATE: 18 BRPM | HEART RATE: 100 BPM

## 2023-05-20 DIAGNOSIS — T14.90XA INJURY: ICD-10-CM

## 2023-05-20 DIAGNOSIS — S00.83XA CONTUSION OF FACE, INITIAL ENCOUNTER: ICD-10-CM

## 2023-05-20 DIAGNOSIS — S80.02XA CONTUSION OF LEFT KNEE, INITIAL ENCOUNTER: Primary | ICD-10-CM

## 2023-05-20 LAB
B-HCG UR QL: NEGATIVE
CTP QC/QA: YES

## 2023-05-20 PROCEDURE — 25000003 PHARM REV CODE 250: Mod: ER | Performed by: FAMILY MEDICINE

## 2023-05-20 PROCEDURE — 99283 EMERGENCY DEPT VISIT LOW MDM: CPT | Mod: ER

## 2023-05-20 PROCEDURE — 81025 URINE PREGNANCY TEST: CPT | Mod: ER | Performed by: FAMILY MEDICINE

## 2023-05-20 RX ORDER — KETOROLAC TROMETHAMINE 10 MG/1
10 TABLET, FILM COATED ORAL
Status: COMPLETED | OUTPATIENT
Start: 2023-05-20 | End: 2023-05-20

## 2023-05-20 RX ORDER — NAPROXEN 500 MG/1
500 TABLET ORAL 2 TIMES DAILY
Qty: 20 TABLET | Refills: 0 | Status: SHIPPED | OUTPATIENT
Start: 2023-05-20 | End: 2023-06-01

## 2023-05-20 RX ADMIN — KETOROLAC TROMETHAMINE 10 MG: 10 TABLET, FILM COATED ORAL at 07:05

## 2023-05-20 NOTE — Clinical Note
"Cristina Patel" Moisés was seen and treated in our emergency department on 5/20/2023.  She may return with limitations on 05/29/2023.       Sincerely,      Zackery Monterroso MD    "

## 2023-05-20 NOTE — ED PROVIDER NOTES
Encounter Date: 5/20/2023       History     Chief Complaint   Patient presents with    Fall     Pt to the Er with left knee pain and swelling after falling. Pt tripped and fell in parking lot hitting knee on car park cement block     27-year-old female slipped and fell yesterday and sustained injury to her left lateral knee and bruise to her left facial area.  No loss of consciousness.  No vomiting.  Minimal pain on face but most of the pain is on her left knee area with swelling.  Took OTC pain medication with some improvement.  Patient is able to put partial weight and walk.    The history is provided by the patient.   Review of patient's allergies indicates:   Allergen Reactions    Zithromax [azithromycin]      No past medical history on file.  Past Surgical History:   Procedure Laterality Date    ANTERIOR CRUCIATE LIGAMENT REPAIR      HERNIA REPAIR       Family History   Problem Relation Age of Onset    Breast cancer Neg Hx     Ovarian cancer Neg Hx     Colon cancer Neg Hx      Social History     Tobacco Use    Smoking status: Never    Smokeless tobacco: Never   Substance Use Topics    Alcohol use: Yes     Comment: occas    Drug use: Not Currently     Types: Marijuana     Comment: daily-     Review of Systems   Constitutional:  Negative for fever.   HENT:  Negative for sore throat.    Respiratory:  Negative for shortness of breath.    Cardiovascular:  Negative for chest pain.   Gastrointestinal:  Negative for nausea.   Genitourinary:  Negative for dysuria.   Musculoskeletal:  Negative for back pain.   Skin:  Negative for rash.   Neurological:  Negative for weakness.   Hematological:  Does not bruise/bleed easily.   All other systems reviewed and are negative.    Physical Exam     Initial Vitals [05/20/23 0622]   BP Pulse Resp Temp SpO2   127/72 100 18 99.3 °F (37.4 °C) 100 %      MAP       --         Physical Exam    Nursing note and vitals reviewed.  Constitutional: Vital signs are normal. She appears  well-developed and well-nourished. She is active. No distress.   HENT:   Head: Normocephalic.   Nose: Nose normal.   Mouth/Throat: Oropharynx is clear and moist and mucous membranes are normal.   Left temporal area small bruise.  Minimal tenderness.   Eyes: Conjunctivae, EOM and lids are normal.   Neck: Neck supple.   Normal range of motion.  Cardiovascular:  Normal rate, regular rhythm, S1 normal, S2 normal and normal heart sounds.           Pulmonary/Chest: Breath sounds normal. No respiratory distress.   Abdominal: Abdomen is soft.   Musculoskeletal:      Right upper arm: Normal.      Left upper arm: Normal.      Cervical back: Normal range of motion and neck supple.      Right lower leg: Normal.      Left lower leg: Normal.      Comments: Left proximal knee with bruising and swelling noted.  Extreme flexion restricted secondary to pain.     Neurological: She is alert and oriented to person, place, and time. She has normal strength. GCS eye subscore is 4. GCS verbal subscore is 5. GCS motor subscore is 6.   Skin: Skin is warm. Capillary refill takes less than 2 seconds.   Psychiatric: She has a normal mood and affect. Her speech is normal and behavior is normal. Thought content normal. Cognition and memory are normal.       ED Course   Procedures  Labs Reviewed   POCT URINE PREGNANCY          Imaging Results              X-Ray Knee 3 View Left (In process)  Result time 05/20/23 06:40:42                     Medications   ketorolac tablet 10 mg (has no administration in time range)     Medical Decision Making:   Initial Assessment:   Facial contusion, abrasion,-minimal pain.- normal neuro exam.  Left lateral knee bruising and swelling.  Partial weight-bearing.  Partial flexion.  Differential Diagnosis:   Abrasion, contusion of facial area,  Left knee sprain, contusion, , fracture  ED Management:  X-ray of left knee with no acute fracture dislocation.  Abrasion on left temporal area minimal with no deep tenderness  or deformity or crackling.  Does not suspect any fracture or intracranial injury as patient GCS is 15 and no loss of consciousness.  Normal neuro exam.  Naproxen as needed.  Follow-up PCP in 1 week.  ED with any worsening symptoms.                        Clinical Impression:   Final diagnoses:  [T14.90XA] Injury  [S80.02XA] Contusion of left knee, initial encounter (Primary)  [S00.83XA] Contusion of face, initial encounter        ED Disposition Condition    Discharge Stable          ED Prescriptions       Medication Sig Dispense Start Date End Date Auth. Provider    naproxen (NAPROSYN) 500 MG tablet Take 1 tablet (500 mg total) by mouth 2 (two) times daily. 20 tablet 5/20/2023 -- Zackery Monterroso MD          Follow-up Information    None          Zackery Monterroso MD  05/20/23 1422

## 2023-06-01 ENCOUNTER — OFFICE VISIT (OUTPATIENT)
Dept: ORTHOPEDICS | Facility: CLINIC | Age: 28
End: 2023-06-01
Payer: COMMERCIAL

## 2023-06-01 VITALS — BODY MASS INDEX: 21.26 KG/M2 | WEIGHT: 120 LBS | HEIGHT: 63 IN

## 2023-06-01 DIAGNOSIS — S80.02XA CONTUSION OF LEFT KNEE, INITIAL ENCOUNTER: Primary | ICD-10-CM

## 2023-06-01 PROCEDURE — 99999 PR PBB SHADOW E&M-EST. PATIENT-LVL III: CPT | Mod: PBBFAC,,, | Performed by: ORTHOPAEDIC SURGERY

## 2023-06-01 PROCEDURE — 99213 OFFICE O/P EST LOW 20 MIN: CPT | Mod: S$GLB,,, | Performed by: ORTHOPAEDIC SURGERY

## 2023-06-01 PROCEDURE — 99999 PR PBB SHADOW E&M-EST. PATIENT-LVL III: ICD-10-PCS | Mod: PBBFAC,,, | Performed by: ORTHOPAEDIC SURGERY

## 2023-06-01 PROCEDURE — 99213 PR OFFICE/OUTPT VISIT, EST, LEVL III, 20-29 MIN: ICD-10-PCS | Mod: S$GLB,,, | Performed by: ORTHOPAEDIC SURGERY

## 2023-06-01 RX ORDER — INDOMETHACIN 50 MG/1
50 CAPSULE ORAL 2 TIMES DAILY WITH MEALS
Qty: 60 EACH | Refills: 1 | Status: SHIPPED | OUTPATIENT
Start: 2023-06-01 | End: 2023-11-08

## 2023-06-01 NOTE — PROGRESS NOTES
Subjective:      Patient ID: Cristina Curiel is a 27 y.o. female.  Patient examined with medical assistant, Star  Chief Complaint: Knee Injury    HPI     They have experienced problems with their left knee over the past 2 weeks. The patient reports relevant history of injury/aggravation.  She reportedly fell in a parking lot sustaining a direct blow proximal to the patella.  Pain is located  anteriorly Associated symptoms include swelling.  Symptoms are aggravated by no particular activity. They have been treated with nothing specific.   Symptoms have recently improved. Ambulation reportedly has not been impaired. Self care ADLs are not painful.     Review of Systems   Constitutional: Negative for fever and weight loss.   HENT:  Negative for congestion.    Eyes:  Negative for visual disturbance.   Cardiovascular:  Negative for chest pain.   Respiratory:  Negative for shortness of breath.    Hematologic/Lymphatic: Negative for bleeding problem. Does not bruise/bleed easily.   Skin:  Negative for poor wound healing.   Musculoskeletal:  Positive for joint pain.   Gastrointestinal:  Negative for abdominal pain.   Genitourinary:  Negative for dysuria.   Neurological:  Negative for seizures.   Psychiatric/Behavioral:  Negative for altered mental status.    Allergic/Immunologic: Negative for persistent infections.       Objective:      Ortho/SPM Exam        Left knee    The patient is not in acute distress.   Body habitus is normal.   Sclera appear normal  No respiratory distress  The patient walks without a limp.  Resisted SLR negative.   The skin over the knee is intact.  Knee effusion 0  Palpation:  There is a firm, 2 cm minimally tender nodule in the substance of the vastus lateralis proximal to the patella  Range of motion- Flexion full, Extension full.   Ligament exam:   MCL intact   Lachman intact              Post sag intact    LCL intact  Patellar apprehension negative.  Popliteal cyst negative  Patellar  crepitation absent.  Flexion/pinch negative.  Pulses DP present, PT present.  Motor normal 5/5 strength in all tested muscle groups.   Sensory normal.      I reviewed the relevant imaging for the patient's condition:  Left knee radiographs show no fracture or dislocation.    Assessment:       Encounter Diagnosis   Name Primary?    Contusion of left knee, initial encounter Yes            The palpable area is most likely hematoma.  There is some chance that this could progress to myositis ossificans.      Plan:       Cristina was seen today for knee injury.    Diagnoses and all orders for this visit:    Contusion of left knee, initial encounter            Explained my assessment    Local heat application recommended    Patient advised to discontinue Naprosyn    Indocin for 1 month-usual precautions explained

## 2023-07-05 ENCOUNTER — PATIENT MESSAGE (OUTPATIENT)
Dept: RESEARCH | Facility: HOSPITAL | Age: 28
End: 2023-07-05
Payer: COMMERCIAL

## 2023-07-11 ENCOUNTER — PATIENT MESSAGE (OUTPATIENT)
Dept: FAMILY MEDICINE | Facility: CLINIC | Age: 28
End: 2023-07-11
Payer: COMMERCIAL

## 2023-09-15 ENCOUNTER — OFFICE VISIT (OUTPATIENT)
Dept: FAMILY MEDICINE | Facility: CLINIC | Age: 28
End: 2023-09-15
Payer: COMMERCIAL

## 2023-09-15 DIAGNOSIS — L30.9 DERMATITIS: Primary | ICD-10-CM

## 2023-09-15 PROCEDURE — 1160F PR REVIEW ALL MEDS BY PRESCRIBER/CLIN PHARMACIST DOCUMENTED: ICD-10-PCS | Mod: CPTII,95,, | Performed by: NURSE PRACTITIONER

## 2023-09-15 PROCEDURE — 1160F RVW MEDS BY RX/DR IN RCRD: CPT | Mod: CPTII,95,, | Performed by: NURSE PRACTITIONER

## 2023-09-15 PROCEDURE — 1159F MED LIST DOCD IN RCRD: CPT | Mod: CPTII,95,, | Performed by: NURSE PRACTITIONER

## 2023-09-15 PROCEDURE — 99214 PR OFFICE/OUTPT VISIT, EST, LEVL IV, 30-39 MIN: ICD-10-PCS | Mod: 95,,, | Performed by: NURSE PRACTITIONER

## 2023-09-15 PROCEDURE — 99214 OFFICE O/P EST MOD 30 MIN: CPT | Mod: 95,,, | Performed by: NURSE PRACTITIONER

## 2023-09-15 PROCEDURE — 1159F PR MEDICATION LIST DOCUMENTED IN MEDICAL RECORD: ICD-10-PCS | Mod: CPTII,95,, | Performed by: NURSE PRACTITIONER

## 2023-09-15 RX ORDER — METHYLPREDNISOLONE 4 MG/1
TABLET ORAL
Qty: 21 EACH | Refills: 0 | Status: SHIPPED | OUTPATIENT
Start: 2023-09-15 | End: 2023-10-06

## 2023-09-15 NOTE — PROGRESS NOTES
Subjective:       Patient ID: Cristina Curiel is a 27 y.o. female.    Chief Complaint: Rash    Rash  This is a new problem. The current episode started in the past 7 days. The problem has been waxing and waning since onset. The rash is diffuse (scattered areas to trunk arm and legs). The rash is characterized by itchiness and redness. 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 Pertinent negatives include no anorexia, congestion, cough, diarrhea, eye pain, facial edema, fatigue, fever, joint pain, nail changes, rhinorrhea, shortness of breath, sore throat or vomiting. Past treatments include anti-itch cream and topical steroids. The treatment provided moderate relief. Her past medical history is significant for allergies and eczema. There is no history of asthma or varicella.       The patient location is: [place], [place]  The chief complaint leading to consultation is: RASH    Visit type: audiovisual    Face to Face time with patient: 15  20 minutes of total time spent on the encounter, which includes face to face time and non-face to face time preparing to see the patient (eg, review of tests), Obtaining and/or reviewing separately obtained history, Documenting clinical information in the electronic or other health record, Independently interpreting results (not separately reported) and communicating results to the patient/family/caregiver, or Care coordination (not separately reported).         Each patient to whom he or she provides medical services by telemedicine is:  (1) informed of the relationship between the physician and patient and the respective role of any other health care provider with respect to management of the patient; and (2) notified that he or she may decline to receive medical services by telemedicine and may withdraw from such care at any time.    Notes:        Review of Systems   Constitutional:   Negative for fatigue and fever.   HENT:  Negative for congestion, rhinorrhea and sore throat.    Eyes:  Negative for pain.   Respiratory:  Negative for cough and shortness of breath.    Gastrointestinal:  Negative for anorexia, diarrhea and vomiting.   Musculoskeletal:  Negative for joint pain.   Skin:  Positive for rash. Negative for nail changes.         Objective:     There were no vitals filed for this visit.       Physical Exam  Constitutional:       General: She is not in acute distress.     Appearance: She is well-developed. She is not diaphoretic.   HENT:      Head: Normocephalic and atraumatic.   Eyes:      General: No scleral icterus.        Right eye: No discharge.         Left eye: No discharge.      Conjunctiva/sclera: Conjunctivae normal.      Pupils: Pupils are equal, round, and reactive to light.   Pulmonary:      Effort: Pulmonary effort is normal. No respiratory distress.   Skin:     Findings: Rash present.      Comments: See pictures below.   Neurological:      Mental Status: She is alert and oriented to person, place, and time.   Psychiatric:         Behavior: Behavior normal.         Thought Content: Thought content normal.         Judgment: Judgment normal.                         Assessment:         ICD-10-CM ICD-9-CM   1. Dermatitis  L30.9 692.9       Plan:       Dermatitis  Hydrocortisone cream is helping so will treat with oral steroid  Benadryl prn itching  Change detergent back to one you know you have no reaction to.  Follow up as needed.  -     methylPREDNISolone (MEDROL DOSEPACK) 4 mg tablet; use as directed  Dispense: 21 each; Refill: 0      Follow up if symptoms worsen or fail to improve.     Patient's Medications   New Prescriptions    METHYLPREDNISOLONE (MEDROL DOSEPACK) 4 MG TABLET    use as directed   Previous Medications    ETONOGESTREL (IMPLANON SDRM)    by Subdermal route.    INDOMETHACIN (INDOCIN) 50 MG CAPSULE    Take 1 capsule (50 mg total) by mouth 2 (two) times daily with  meals.   Modified Medications    No medications on file   Discontinued Medications    No medications on file       History reviewed. No pertinent past medical history.    Past Surgical History:   Procedure Laterality Date    ANTERIOR CRUCIATE LIGAMENT REPAIR      HERNIA REPAIR         Family History   Problem Relation Age of Onset    Diabetes Mother     Breast cancer Neg Hx     Ovarian cancer Neg Hx     Colon cancer Neg Hx        Social History     Socioeconomic History    Marital status: Single   Tobacco Use    Smoking status: Never    Smokeless tobacco: Never   Substance and Sexual Activity    Alcohol use: Yes     Alcohol/week: 1.0 standard drink of alcohol     Types: 1 Drinks containing 0.5 oz of alcohol per week     Comment: occas    Drug use: Not Currently     Types: Marijuana     Comment: daily-    Sexual activity: Yes     Partners: Female     Birth control/protection: None, Implant     Comment: Condom sometimes

## 2023-09-20 ENCOUNTER — OFFICE VISIT (OUTPATIENT)
Dept: OBSTETRICS AND GYNECOLOGY | Facility: CLINIC | Age: 28
End: 2023-09-20
Payer: COMMERCIAL

## 2023-09-20 VITALS — BODY MASS INDEX: 21.52 KG/M2 | SYSTOLIC BLOOD PRESSURE: 104 MMHG | DIASTOLIC BLOOD PRESSURE: 57 MMHG | WEIGHT: 121.5 LBS

## 2023-09-20 DIAGNOSIS — L73.9 FOLLICULITIS: ICD-10-CM

## 2023-09-20 DIAGNOSIS — Z12.4 ENCOUNTER FOR PAPANICOLAOU SMEAR FOR CERVICAL CANCER SCREENING: ICD-10-CM

## 2023-09-20 DIAGNOSIS — Z01.419 WOMEN'S ANNUAL ROUTINE GYNECOLOGICAL EXAMINATION: Primary | ICD-10-CM

## 2023-09-20 DIAGNOSIS — Z11.3 SCREEN FOR STD (SEXUALLY TRANSMITTED DISEASE): ICD-10-CM

## 2023-09-20 PROCEDURE — 99395 PR PREVENTIVE VISIT,EST,18-39: ICD-10-PCS | Mod: S$GLB,,, | Performed by: NURSE PRACTITIONER

## 2023-09-20 PROCEDURE — 3074F PR MOST RECENT SYSTOLIC BLOOD PRESSURE < 130 MM HG: ICD-10-PCS | Mod: CPTII,S$GLB,, | Performed by: NURSE PRACTITIONER

## 2023-09-20 PROCEDURE — 88175 CYTOPATH C/V AUTO FLUID REDO: CPT | Performed by: PATHOLOGY

## 2023-09-20 PROCEDURE — 3008F BODY MASS INDEX DOCD: CPT | Mod: CPTII,S$GLB,, | Performed by: NURSE PRACTITIONER

## 2023-09-20 PROCEDURE — 99395 PREV VISIT EST AGE 18-39: CPT | Mod: S$GLB,,, | Performed by: NURSE PRACTITIONER

## 2023-09-20 PROCEDURE — 87591 N.GONORRHOEAE DNA AMP PROB: CPT | Performed by: NURSE PRACTITIONER

## 2023-09-20 PROCEDURE — 3078F DIAST BP <80 MM HG: CPT | Mod: CPTII,S$GLB,, | Performed by: NURSE PRACTITIONER

## 2023-09-20 PROCEDURE — 3008F PR BODY MASS INDEX (BMI) DOCUMENTED: ICD-10-PCS | Mod: CPTII,S$GLB,, | Performed by: NURSE PRACTITIONER

## 2023-09-20 PROCEDURE — 3078F PR MOST RECENT DIASTOLIC BLOOD PRESSURE < 80 MM HG: ICD-10-PCS | Mod: CPTII,S$GLB,, | Performed by: NURSE PRACTITIONER

## 2023-09-20 PROCEDURE — 88141 PR  CYTOPATH CERV/VAG INTERPRET: ICD-10-PCS | Mod: ,,, | Performed by: PATHOLOGY

## 2023-09-20 PROCEDURE — 1159F PR MEDICATION LIST DOCUMENTED IN MEDICAL RECORD: ICD-10-PCS | Mod: CPTII,S$GLB,, | Performed by: NURSE PRACTITIONER

## 2023-09-20 PROCEDURE — 1159F MED LIST DOCD IN RCRD: CPT | Mod: CPTII,S$GLB,, | Performed by: NURSE PRACTITIONER

## 2023-09-20 PROCEDURE — 3074F SYST BP LT 130 MM HG: CPT | Mod: CPTII,S$GLB,, | Performed by: NURSE PRACTITIONER

## 2023-09-20 PROCEDURE — 99999 PR PBB SHADOW E&M-EST. PATIENT-LVL III: ICD-10-PCS | Mod: PBBFAC,,, | Performed by: NURSE PRACTITIONER

## 2023-09-20 PROCEDURE — 99999 PR PBB SHADOW E&M-EST. PATIENT-LVL III: CPT | Mod: PBBFAC,,, | Performed by: NURSE PRACTITIONER

## 2023-09-20 PROCEDURE — 88141 CYTOPATH C/V INTERPRET: CPT | Mod: ,,, | Performed by: PATHOLOGY

## 2023-09-20 RX ORDER — MUPIROCIN 20 MG/G
OINTMENT TOPICAL 3 TIMES DAILY
Qty: 30 G | Refills: 1 | Status: SHIPPED | OUTPATIENT
Start: 2023-09-20 | End: 2023-11-08

## 2023-09-20 NOTE — PROGRESS NOTES
CC: Annual  HPI: Pt is a 27 y.o.  female who presents for routine annual exam. She completed [employer] and is working in [employer]. She uses Nexplanon for contraception. She does want STD screening.  Reports in groin hair bump to her mons. complaints.  The patient participates in regular exercise: yes.  The patient does not smoke. .   Pt denies any domestic violence.       ROS:  GENERAL: Feeling well overall. Denies fever or chills.   SKIN: Denies rash or lesions.   HEAD: Denies head injury or headache.   NODES: Denies enlarged lymph nodes.   CHEST: Denies chest pain or shortness of breath.   CARDIOVASCULAR: Denies palpitations or left sided chest pain.   ABDOMEN: No abdominal pain, constipation, diarrhea, nausea, vomiting or rectal bleeding.   URINARY: No dysuria, hematuria, or burning on urination.  REPRODUCTIVE: See HPI.   BREASTS: Denies pain, lumps, or nipple discharge.   HEMATOLOGIC: No easy bruisability or excessive bleeding.   MUSCULOSKELETAL: Denies joint pain or swelling.   NEUROLOGIC: Denies syncope or weakness.   PSYCHIATRIC: Denies depression, anxiety or mood swings.    PE:   APPEARANCE: Well nourished, well developed, Black or  female in no acute distress.  NODES: no cervical, supraclavicular, or inguinal lymphadenopathy  BREASTS: Symmetrical, no skin changes or visible lesions. No palpable masses, nipple discharge or adenopathy bilaterally.  ABDOMEN: Soft. No tenderness or masses. No distention. No hernias palpated. No CVA tenderness.  VULVA: No lesions. Normal external female genitalia.  URETHRAL MEATUS: Normal size and location, no lesions, no prolapse.  URETHRA: No masses, tenderness, or prolapse.  VAGINA: Moist. No lesions or lacerations noted. No abnormal discharge present. No odor present.   CERVIX: No lesions or discharge. No cervical motion tenderness.   UTERUS: Normal size, regular shape, mobile, non-tender.  ADNEXA: No tenderness. No fullness or masses palpated in  the adnexal regions.   ANUS PERINEUM: Normal.      Diagnosis:  1. Women's annual routine gynecological examination    2. Encounter for Papanicolaou smear for cervical cancer screening    3. Screen for STD (sexually transmitted disease)    4. Folliculitis        Plan:   Pap  GCCT  Folliculitis- Discussed to apply warm compresses 2  times per day for 15- 20 minutes to site for comfort. Avoid tight fitting clothing.   Can use Hibiclens antibacterial soap   Bactroban to area as needed  Avoid shaving too soon- wait until hair is at least 1/4 inch or    Orders Placed This Encounter    C. trachomatis/N. gonorrhoeae by AMP DNA    Liquid-Based Pap Smear, Screening    mupirocin (BACTROBAN) 2 % ointment       Patient was counseled today on the new ACS guidelines for cervical cytology screening as well as the current recommendations for breast cancer screening. She was counseled to follow up with her PCP for other routine health maintenance. Counseling session lasted approximately 10 minutes, and all her questions were answered.    Follow-up with me in 1 year for routine exam    AILIN Matute

## 2023-09-22 LAB
C TRACH DNA SPEC QL NAA+PROBE: NOT DETECTED
N GONORRHOEA DNA SPEC QL NAA+PROBE: NOT DETECTED

## 2023-09-27 LAB
FINAL PATHOLOGIC DIAGNOSIS: NORMAL
Lab: NORMAL

## 2023-11-08 ENCOUNTER — HOSPITAL ENCOUNTER (OUTPATIENT)
Dept: RADIOLOGY | Facility: HOSPITAL | Age: 28
Discharge: HOME | End: 2023-11-08
Attending: ORTHOPAEDIC SURGERY
Payer: COMMERCIAL

## 2023-11-08 ENCOUNTER — OFFICE VISIT (OUTPATIENT)
Dept: ORTHOPEDICS | Facility: CLINIC | Age: 28
End: 2023-11-08
Payer: COMMERCIAL

## 2023-11-08 VITALS
DIASTOLIC BLOOD PRESSURE: 78 MMHG | HEART RATE: 76 BPM | BODY MASS INDEX: 21.53 KG/M2 | SYSTOLIC BLOOD PRESSURE: 132 MMHG | HEIGHT: 63 IN | WEIGHT: 121.5 LBS

## 2023-11-08 DIAGNOSIS — M25.512 LEFT SHOULDER PAIN, UNSPECIFIED CHRONICITY: Primary | ICD-10-CM

## 2023-11-08 DIAGNOSIS — M25.512 LEFT SHOULDER PAIN, UNSPECIFIED CHRONICITY: ICD-10-CM

## 2023-11-08 DIAGNOSIS — M67.912 ROTATOR CUFF DISORDER, LEFT: Primary | ICD-10-CM

## 2023-11-08 PROCEDURE — 99214 PR OFFICE/OUTPT VISIT, EST, LEVL IV, 30-39 MIN: ICD-10-PCS | Mod: S$GLB,,, | Performed by: ORTHOPAEDIC SURGERY

## 2023-11-08 PROCEDURE — 73030 X-RAY EXAM OF SHOULDER: CPT | Mod: TC,PN,LT

## 2023-11-08 PROCEDURE — 73030 XR SHOULDER COMPLETE 2 OR MORE VIEWS LEFT: ICD-10-PCS | Mod: 26,LT,, | Performed by: RADIOLOGY

## 2023-11-08 PROCEDURE — 3078F PR MOST RECENT DIASTOLIC BLOOD PRESSURE < 80 MM HG: ICD-10-PCS | Mod: CPTII,S$GLB,, | Performed by: ORTHOPAEDIC SURGERY

## 2023-11-08 PROCEDURE — 73030 X-RAY EXAM OF SHOULDER: CPT | Mod: 26,LT,, | Performed by: RADIOLOGY

## 2023-11-08 PROCEDURE — 3008F PR BODY MASS INDEX (BMI) DOCUMENTED: ICD-10-PCS | Mod: CPTII,S$GLB,, | Performed by: ORTHOPAEDIC SURGERY

## 2023-11-08 PROCEDURE — 3075F SYST BP GE 130 - 139MM HG: CPT | Mod: CPTII,S$GLB,, | Performed by: ORTHOPAEDIC SURGERY

## 2023-11-08 PROCEDURE — 99214 OFFICE O/P EST MOD 30 MIN: CPT | Mod: S$GLB,,, | Performed by: ORTHOPAEDIC SURGERY

## 2023-11-08 PROCEDURE — 1159F PR MEDICATION LIST DOCUMENTED IN MEDICAL RECORD: ICD-10-PCS | Mod: CPTII,S$GLB,, | Performed by: ORTHOPAEDIC SURGERY

## 2023-11-08 PROCEDURE — 99999 PR PBB SHADOW E&M-EST. PATIENT-LVL III: CPT | Mod: PBBFAC,,, | Performed by: ORTHOPAEDIC SURGERY

## 2023-11-08 PROCEDURE — 3075F PR MOST RECENT SYSTOLIC BLOOD PRESS GE 130-139MM HG: ICD-10-PCS | Mod: CPTII,S$GLB,, | Performed by: ORTHOPAEDIC SURGERY

## 2023-11-08 PROCEDURE — 99999 PR PBB SHADOW E&M-EST. PATIENT-LVL III: ICD-10-PCS | Mod: PBBFAC,,, | Performed by: ORTHOPAEDIC SURGERY

## 2023-11-08 PROCEDURE — 3008F BODY MASS INDEX DOCD: CPT | Mod: CPTII,S$GLB,, | Performed by: ORTHOPAEDIC SURGERY

## 2023-11-08 PROCEDURE — 3078F DIAST BP <80 MM HG: CPT | Mod: CPTII,S$GLB,, | Performed by: ORTHOPAEDIC SURGERY

## 2023-11-08 PROCEDURE — 1159F MED LIST DOCD IN RCRD: CPT | Mod: CPTII,S$GLB,, | Performed by: ORTHOPAEDIC SURGERY

## 2023-11-08 RX ORDER — NAPROXEN 500 MG/1
500 TABLET ORAL DAILY
Qty: 14 TABLET | Refills: 0 | Status: SHIPPED | OUTPATIENT
Start: 2023-11-08 | End: 2023-11-22

## 2023-11-08 NOTE — PROGRESS NOTES
[facility], Orthopedics and Sports Medicine  [facility]    New Patient Shoulder Office Visit  11/08/2023     Subjective:      Cristina Curiel is a 27 y.o. right handed female referred by Judit Montiel for evaluation and treatment of a left shoulder problem. This is evaluated as a personal injury.    The patient notes the following symptoms: pain and stiffness.  Symptoms are located posterior and lateral left shoulder .    The symptoms began 1 week ago and are worsening.    The patient is active in none. 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    Related history: negative for prior surgery, trauma, arthritis or disorders.    No acute trauma to the affected shoulder. Patient is a  and reports that she was doing a task involving moving and dragging heavy hoses for    Outside reports reviewed: {Outside review:27648}.    History reviewed. No pertinent past medical history.    Patient Active Problem List   Diagnosis    Wrist pain, acute, right    Gait abnormality    Weakness of both lower extremities       Past Surgical History:   Procedure Laterality Date    ANTERIOR CRUCIATE LIGAMENT REPAIR      HERNIA REPAIR          No current outpatient medications     Review of patient's allergies indicates:   Allergen Reactions    Pineapple Hives    Zithromax [azithromycin]        Social History     Socioeconomic History    Marital status: Single   Tobacco Use    Smoking status: Some Days     Types: Vaping with nicotine    Smokeless tobacco: Never   Substance and Sexual Activity    Alcohol use: Yes     Alcohol/week: 1.0 standard drink of alcohol     Types: 1 Drinks containing 0.5 oz of alcohol per week     Comment: occas    Drug use: Not Currently     Types: Marijuana     Comment: daily-    Sexual activity: Yes     Partners: Female     Birth control/protection: None, Implant     Comment: Condom sometimes       Family History   Problem Relation Age of Onset    Diabetes  Cross coverage for PCP. Please let patient know Jacobo will be back in the clinic on Wednesday and will let her address refilling Xanax for patient.    "Mother     Breast cancer Neg Hx     Ovarian cancer Neg Hx     Colon cancer Neg Hx          ROS     Objective:      Ortho/SPM Exam    Imaging:  Radiographs of the {LEFT/RIGHT/Bl:02204} shoulder taken {ppiv imaging date:39703} were personally reviewed from {ppiv imaging source:39704::"the [facility] EMR"}.  Multiple views of the shoulder are available today for review, including an AP, scapular Y, axillary view.  The glenohumeral joint demonstrates {ppiv xray degen changes:88937}.  The acromioclavicular joint demonstrates {ppiv xray degen changes:34957}.  The glenohumeral joint is concentrically reduced.  There is no acute fracture or dislocation.      Procedures        Assessment:       Cristina Curiel is a 27 y.o. female seen in the office today. There were no encounter diagnoses.  {Blank Single:53862::"Referal to another provider (***)","Non-operative treatment","Operative treatment with ***","Further work-up"} is recommended at this time. ***. The natural history and expected course discussed with patient. Various treatment options were discussed, including their risks and benefits. All of the patient's questions were answered.     Plan:      {ppivOV general plan:63519}         Romeo Lomas IV, MD   of Clinical Orthopedics  Department of Orthopedic Surgery  [facility]  Office: [phone]  Website: [email or web address]            "

## 2023-11-08 NOTE — PROGRESS NOTES
[facility], Orthopedics and Sports Medicine  [facility]    New Patient Shoulder Office Visit  11/08/2023     Subjective:      Cristina Curiel is a 27 y.o. right handed female referred by Judit Montiel for evaluation and treatment of a left shoulder problem. This is evaluated as a personal injury.    The patient notes the following symptoms: pain and stiffness.  Symptoms are located posterior and lateral left shoulder.    The symptoms began 7 week ago and are worsening.  Pain is worsening since initial injury. Pain is present at night and often presents sleep. 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    Works as .       Related history: negative for prior surgery, trauma, arthritis or disorders.    Outside reports reviewed: historical medical records, office notes     History reviewed. No pertinent past medical history.    Patient Active Problem List   Diagnosis    Wrist pain, acute, right    Gait abnormality    Weakness of both lower extremities       Past Surgical History:   Procedure Laterality Date    ANTERIOR CRUCIATE LIGAMENT REPAIR      HERNIA REPAIR          Current Outpatient Medications   Medication Instructions    naproxen (NAPROSYN) 500 mg, Oral, Daily        Review of patient's allergies indicates:   Allergen Reactions    Pineapple Hives    Zithromax [azithromycin]        Social History     Socioeconomic History    Marital status: Single   Tobacco Use    Smoking status: Some Days     Types: Vaping with nicotine    Smokeless tobacco: Never   Substance and Sexual Activity    Alcohol use: Yes     Alcohol/week: 1.0 standard drink of alcohol     Types: 1 Drinks containing 0.5 oz of alcohol per week     Comment: occas    Drug use: Not Currently     Types: Marijuana     Comment: daily-    Sexual activity: Yes     Partners: Female     Birth control/protection: None, Implant     Comment: Condom  sometimes       Family History   Problem Relation Age of Onset    Diabetes Mother     Breast cancer Neg Hx     Ovarian cancer Neg Hx     Colon cancer Neg Hx          Review of Systems   Constitutional: Negative for fever.   HENT:  Negative for congestion.    Eyes:  Negative for blurred vision.   Cardiovascular:  Negative for chest pain and orthopnea.   Musculoskeletal:  Positive for joint pain. Negative for joint swelling and muscle weakness.   Gastrointestinal:  Negative for abdominal pain.   Neurological:  Negative for sensory change.        Objective:      General    Nursing note and vitals reviewed.  Constitutional: She is oriented to person, place, and time. She appears well-developed and well-nourished. No distress.   HENT:   Head: Normocephalic and atraumatic.   Eyes: EOM are normal. Pupils are equal, round, and reactive to light.   Cardiovascular:  Normal rate and regular rhythm.            Pulmonary/Chest: Effort normal and breath sounds normal. No respiratory distress.   Abdominal: Soft.   Neurological: She is alert and oriented to person, place, and time.   Psychiatric: She has a normal mood and affect. Her behavior is normal.         Right Shoulder Exam   Right shoulder exam is normal.    Inspection/Observation   Swelling: absent  Bruising: absent  Atrophy: absent    Tenderness   The patient is experiencing no tenderness.    Range of Motion   Active abduction:  170 normal   Forward Flexion:  170 normal   External Rotation 0 degrees:  50 normal   Internal rotation 0 degrees:  T8 normal     Tests & Signs   Drop arm: negative  Doss test: negative  Impingement: negative  Belly Press: negative  Active Compression Test ([place]'s Sign): negative  Speed's Test: negative    Other   Sensation: normal    Comments:  Sukhi test- negative    Left Shoulder Exam     Inspection/Observation   Swelling: absent  Bruising: absent  Atrophy: absent    Tenderness   The patient is experiencing no tenderness.     Range of  Motion   Active abduction:  170 abnormal   Forward Flexion:  170 abnormal   External Rotation 0 degrees:  50 normal   Internal rotation 0 degrees:  T8 normal     Muscle Strength   The patient has normal left shoulder strength.    Tests & Signs   Drop arm: positive  Doss test: positive  Impingement: positive  Rotator Cuff Painful Arc/Range: moderate  Belly Press: negative  Active Compression test (Alger's Sign): negative  Speed's Test: negative    Other   Sensation: normal     Comments:  Sukhi test- negative    Pain with shoulder range of motion       Muscle Strength   Right Upper Extremity   Shoulder Abduction: 5/5   Shoulder Internal Rotation: 5/5   Shoulder External Rotation: 5/5   Biceps: 5/5   Left Upper Extremity  Shoulder Abduction: 4/5   Shoulder Internal Rotation: 5/5   Shoulder External Rotation: 4/5   Biceps: 5/5     Reflexes     Left Side  Biceps:  2+  Triceps:  2+  Brachioradialis:  2+  Left [place]'s Sign:  Absent    Right Side   Biceps:  2+  Triceps:  2+  Brachioradialis:  2+  Right [place]'s Sign:  absent    Vascular Exam     Right Pulses      Radial:                    2+      Left Pulses      Radial:                    2+        Imaging:  Radiographs of the left shoulder taken 11/08/2023 were personally reviewed from the [facility] EMR.  Multiple views of the shoulder are available today for review, including an AP, scapular Y, axillary view.  The glenohumeral joint demonstrates no degenerative changes .  The acromioclavicular joint demonstrates no degenerative changes .  The glenohumeral joint is concentrically reduced.  There is no acute fracture or dislocation.      Procedures        Assessment:       Cristina Curiel is a 27 y.o. female seen in the office today. The encounter diagnosis was Rotator cuff disorder, left.  Further work-up is recommended at this time. Physical exam concerning for a rotator cuff tear vs muscle strain.  The natural history and expected course discussed with patient.  Various treatment options were discussed, including their risks and benefits. All of the patient's questions were answered.     Plan:      MRI Left Shoulder without contrast.  Follow up visit in approximately 2 weeks to discuss results of MRI.  Naproxen 500 mg daily PRN for 14 days         Romeo Lomas IV, MD   of Clinical Orthopedics  Department of Orthopedic Surgery  [facility]  Office: [phone]  Website: [email or web address]      Orders Placed This Encounter    MRI Shoulder Without Contrast Left    naproxen (NAPROSYN) 500 MG tablet

## 2023-11-09 ENCOUNTER — TELEPHONE (OUTPATIENT)
Dept: ORTHOPEDICS | Facility: CLINIC | Age: 28
End: 2023-11-09
Payer: COMMERCIAL

## 2023-11-09 ENCOUNTER — PATIENT MESSAGE (OUTPATIENT)
Dept: ORTHOPEDICS | Facility: CLINIC | Age: 28
End: 2023-11-09
Payer: COMMERCIAL

## 2023-11-09 NOTE — TELEPHONE ENCOUNTER
----- Message from Janee Dozier sent at 11/9/2023  3:35 PM CST -----  Type:  Letter for Leave of Absence    Who Called:pt  Does the patient know what this is regarding?:letter for leave of absence  Would the patient rather a call back or a response via [facility]? call  Best Call Back Number:[phone]  Additional Information:

## 2023-11-10 ENCOUNTER — TELEPHONE (OUTPATIENT)
Dept: ORTHOPEDICS | Facility: CLINIC | Age: 28
End: 2023-11-10
Payer: COMMERCIAL

## 2023-11-10 NOTE — TELEPHONE ENCOUNTER
----- Message from Brit Jerez sent at 11/10/2023  9:35 AM CST -----  Type:  Patient Returning Call    Who Called:pt  Who Left Message for Patient:francis  Does the patient know what this is regarding?:returning call  Would the patient rather a call back or a response via [facility]? call  Best Call Back Number: [phone]  Additional Information: leave of absence

## 2023-11-17 ENCOUNTER — HOSPITAL ENCOUNTER (OUTPATIENT)
Dept: RADIOLOGY | Facility: HOSPITAL | Age: 28
Discharge: HOME | End: 2023-11-17
Attending: STUDENT IN AN ORGANIZED HEALTH CARE EDUCATION/TRAINING PROGRAM
Payer: COMMERCIAL

## 2023-11-17 DIAGNOSIS — M67.912 ROTATOR CUFF DISORDER, LEFT: ICD-10-CM

## 2023-11-17 PROCEDURE — 73221 MRI JOINT UPR EXTREM W/O DYE: CPT | Mod: 26,LT,, | Performed by: RADIOLOGY

## 2023-11-17 PROCEDURE — 73221 MRI SHOULDER WITHOUT CONTRAST LEFT: ICD-10-PCS | Mod: 26,LT,, | Performed by: RADIOLOGY

## 2023-11-17 PROCEDURE — 73221 MRI JOINT UPR EXTREM W/O DYE: CPT | Mod: TC,PN,LT

## 2023-11-22 ENCOUNTER — OFFICE VISIT (OUTPATIENT)
Dept: ORTHOPEDICS | Facility: CLINIC | Age: 28
End: 2023-11-22
Payer: COMMERCIAL

## 2023-11-22 VITALS
HEIGHT: 63 IN | BODY MASS INDEX: 21.53 KG/M2 | SYSTOLIC BLOOD PRESSURE: 109 MMHG | DIASTOLIC BLOOD PRESSURE: 68 MMHG | HEART RATE: 82 BPM | WEIGHT: 121.5 LBS

## 2023-11-22 DIAGNOSIS — S46.012S ROTATOR CUFF STRAIN, LEFT, SEQUELA: Primary | ICD-10-CM

## 2023-11-22 DIAGNOSIS — M75.22 BICEPS TENDONITIS ON LEFT: ICD-10-CM

## 2023-11-22 PROCEDURE — 3078F PR MOST RECENT DIASTOLIC BLOOD PRESSURE < 80 MM HG: ICD-10-PCS | Mod: CPTII,S$GLB,, | Performed by: ORTHOPAEDIC SURGERY

## 2023-11-22 PROCEDURE — 3074F SYST BP LT 130 MM HG: CPT | Mod: CPTII,S$GLB,, | Performed by: ORTHOPAEDIC SURGERY

## 2023-11-22 PROCEDURE — 3074F PR MOST RECENT SYSTOLIC BLOOD PRESSURE < 130 MM HG: ICD-10-PCS | Mod: CPTII,S$GLB,, | Performed by: ORTHOPAEDIC SURGERY

## 2023-11-22 PROCEDURE — 3008F PR BODY MASS INDEX (BMI) DOCUMENTED: ICD-10-PCS | Mod: CPTII,S$GLB,, | Performed by: ORTHOPAEDIC SURGERY

## 2023-11-22 PROCEDURE — 99999 PR PBB SHADOW E&M-EST. PATIENT-LVL IV: ICD-10-PCS | Mod: PBBFAC,,, | Performed by: ORTHOPAEDIC SURGERY

## 2023-11-22 PROCEDURE — 1159F MED LIST DOCD IN RCRD: CPT | Mod: CPTII,S$GLB,, | Performed by: ORTHOPAEDIC SURGERY

## 2023-11-22 PROCEDURE — 99213 OFFICE O/P EST LOW 20 MIN: CPT | Mod: S$GLB,,, | Performed by: ORTHOPAEDIC SURGERY

## 2023-11-22 PROCEDURE — 3008F BODY MASS INDEX DOCD: CPT | Mod: CPTII,S$GLB,, | Performed by: ORTHOPAEDIC SURGERY

## 2023-11-22 PROCEDURE — 1159F PR MEDICATION LIST DOCUMENTED IN MEDICAL RECORD: ICD-10-PCS | Mod: CPTII,S$GLB,, | Performed by: ORTHOPAEDIC SURGERY

## 2023-11-22 PROCEDURE — 3078F DIAST BP <80 MM HG: CPT | Mod: CPTII,S$GLB,, | Performed by: ORTHOPAEDIC SURGERY

## 2023-11-22 PROCEDURE — 99213 PR OFFICE/OUTPT VISIT, EST, LEVL III, 20-29 MIN: ICD-10-PCS | Mod: S$GLB,,, | Performed by: ORTHOPAEDIC SURGERY

## 2023-11-22 PROCEDURE — 99999 PR PBB SHADOW E&M-EST. PATIENT-LVL IV: CPT | Mod: PBBFAC,,, | Performed by: ORTHOPAEDIC SURGERY

## 2023-11-22 NOTE — PROGRESS NOTES
[facility], Orthopedics and Sports Medicine  [facility]    Established Patient Shoulder Office Visit  11/22/2023     Diagnosis:  Left rotator cuff strain  Biceps tendonitis     Subjective:      Cristina Curiel is a 27 y.o. female who returns for follow up treatment of the left shoulder problem.    At the last office visit the patient was prescribed Tylenol and Naproxen for pain.     The patient has had advanced imaging of the shoulder, MRI which was reviewed with patient today.     The patient continues with the following symptoms: pain.  The symptoms are improving. Symptoms are located anterior and posterior shoulder.    Has been out of work as a , but pain is improving since last visit.    Has not started therapy.     Requesting claim to be processed by workers compensation.     Outside reports reviewed: none.    History reviewed. No pertinent past medical history.    Patient Active Problem List   Diagnosis    Wrist pain, acute, right    Gait abnormality    Weakness of both lower extremities       Past Surgical History:   Procedure Laterality Date    ANTERIOR CRUCIATE LIGAMENT REPAIR      HERNIA REPAIR          Current Outpatient Medications   Medication Instructions    naproxen (NAPROSYN) 500 mg, Oral, Daily        Review of patient's allergies indicates:   Allergen Reactions    Pineapple Hives    Zithromax [azithromycin]        Social History     Socioeconomic History    Marital status: Single   Tobacco Use    Smoking status: Some Days     Types: Vaping with nicotine    Smokeless tobacco: Never   Substance and Sexual Activity    Alcohol use: Yes     Alcohol/week: 1.0 standard drink of alcohol     Types: 1 Drinks containing 0.5 oz of alcohol per week     Comment: occas    Drug use: Not Currently     Types: Marijuana     Comment: daily-    Sexual activity: Yes     Partners: Female     Birth control/protection: None, Implant     Comment: Condom sometimes       Family History    Problem Relation Age of Onset    Diabetes Mother     Breast cancer Neg Hx     Ovarian cancer Neg Hx     Colon cancer Neg Hx          Review of Systems   Constitutional: Negative for fever.   HENT:  Negative for congestion.    Eyes:  Negative for blurred vision.   Cardiovascular:  Negative for chest pain and orthopnea.   Musculoskeletal:  Positive for myalgias and stiffness. Negative for back pain, falls, joint pain, joint swelling, muscle cramps, muscle weakness and neck pain.   Gastrointestinal:  Negative for abdominal pain.   Neurological:  Negative for sensory change.        Objective:      General    Nursing note and vitals reviewed.  Constitutional: She is oriented to person, place, and time. She appears well-developed and well-nourished. No distress.   HENT:   Head: Normocephalic and atraumatic.   Eyes: EOM are normal. Pupils are equal, round, and reactive to light.   Cardiovascular:  Normal rate and regular rhythm.            Pulmonary/Chest: Effort normal and breath sounds normal. No respiratory distress.   Abdominal: Soft.   Neurological: She is alert and oriented to person, place, and time.   Psychiatric: She has a normal mood and affect. Her behavior is normal.         Right Shoulder Exam   Right shoulder exam is normal.    Inspection/Observation   Swelling: absent  Bruising: absent  Atrophy: absent    Tenderness   The patient is experiencing no tenderness.    Range of Motion   Active abduction:  170 normal   Forward Flexion:  170 normal   External Rotation 0 degrees:  50 normal   Internal rotation 0 degrees:  T8 normal     Tests & Signs   Drop arm: negative  Doss test: negative  Impingement: negative  Belly Press: negative  Active Compression Test (Boston's Sign): negative  Speed's Test: negative    Other   Sensation: normal    Comments:  Sukhi test- negative    Left Shoulder Exam     Inspection/Observation   Swelling: absent  Bruising: absent  Atrophy: absent    Tenderness   The patient is  experiencing no tenderness.     Range of Motion   Active abduction:  170 abnormal   Forward Flexion:  170 abnormal   External Rotation 0 degrees:  50 normal   Internal rotation 0 degrees:  T8 normal     Muscle Strength   The patient has normal left shoulder strength.    Tests & Signs   Drop arm: positive  Doss test: positive  Impingement: positive  Rotator Cuff Painful Arc/Range: moderate  Belly Press: negative  Active Compression test (Duplin's Sign): negative  Speed's Test: negative    Other   Sensation: normal     Comments:  Sukhi test- negative    Pain with shoulder range of motion       Muscle Strength   Right Upper Extremity   Shoulder Abduction: 5/5   Shoulder Internal Rotation: 5/5   Shoulder External Rotation: 5/5   Biceps: 5/5   Left Upper Extremity  Shoulder Abduction: 4/5   Shoulder Internal Rotation: 5/5   Shoulder External Rotation: 4/5   Biceps: 5/5     Reflexes     Left Side  Biceps:  2+  Triceps:  2+  Brachioradialis:  2+  Left Washington's Sign:  Absent    Right Side   Biceps:  2+  Triceps:  2+  Brachioradialis:  2+  Right Washington's Sign:  absent    Vascular Exam     Right Pulses      Radial:                    2+      Left Pulses      Radial:                    2+        Imaging:  MRI of the left shoulder taken taken  11/17/2023  was personally reviewed from the [facility].  Multiple T1 and T2 sequences including axial, coronal, and sagittal views were reviewed.  No acute fractures or dislocations are noted in these images.  There is strain of the superior rotator cuff noted.  There is increased fluid in the biceps sheath.          Assessment:       Cristina Curiel is a 27 y.o. female seen in the office today. The primary encounter diagnosis was Rotator cuff strain, left, sequela. A diagnosis of Biceps tendonitis on left was also pertinent to this visit.  Non-operative treatment is recommended at this time. Her pain is actually improving overall but not back to normal so not safe for return to  work as .  Will refer for physical therapy given reassuring MRI.  Patient requested workers compensation for this case so will refer to occupational health.  Will consider injection if pain not better at next visit, declined today.  The natural history and expected course discussed with patient. Various treatment options were discussed, including their risks and benefits. All of the patient's questions were answered.     Plan:      Physical therapy and rehabilitation treatment.  Tylenol 650mg TID, PRN pain.  Follow up in 6 weeks.   Occupational health referral       Orders Placed This Encounter    Ambulatory referral/consult to Physical/Occupational Therapy    Ambulatory referral/consult to Occupational Medicine     Romeo Lomas IV, MD   of Clinical Orthopedics  Department of Orthopedic Surgery  [facility]  Office: [phone]  Website: [email or web address]

## 2023-11-22 NOTE — LETTER
November 22, 2023      [facility]  [address]  [address]  [place] [address]  Phone: [phone]  Fax: [phone]       Patient: Cristina Curiel   YOB: 1995  Date of Visit: 11/22/2023    To Whom It May Concern:    Carmelita Curiel  was at [facility] on 11/22/2023. The patient may not return to work until further evaluation in approximately 6 weeks from this date. If you have any questions or concerns, or if I can be of further assistance, please do not hesitate to contact me.    Sincerely,        Ramila Washington MD

## 2023-12-07 ENCOUNTER — CLINICAL SUPPORT (OUTPATIENT)
Dept: REHABILITATION | Facility: HOSPITAL | Age: 28
End: 2023-12-07
Payer: OTHER MISCELLANEOUS

## 2023-12-07 ENCOUNTER — TELEPHONE (OUTPATIENT)
Dept: ORTHOPEDICS | Facility: CLINIC | Age: 28
End: 2023-12-07
Payer: COMMERCIAL

## 2023-12-07 DIAGNOSIS — M25.512 ACUTE PAIN OF LEFT SHOULDER: Primary | ICD-10-CM

## 2023-12-07 DIAGNOSIS — M25.612 POST-TRAUMATIC STIFFNESS OF LEFT SHOULDER JOINT: ICD-10-CM

## 2023-12-07 DIAGNOSIS — S46.012S ROTATOR CUFF STRAIN, LEFT, SEQUELA: ICD-10-CM

## 2023-12-07 PROCEDURE — 97110 THERAPEUTIC EXERCISES: CPT | Mod: PO

## 2023-12-07 PROCEDURE — 97140 MANUAL THERAPY 1/> REGIONS: CPT | Mod: PO

## 2023-12-07 PROCEDURE — 97161 PT EVAL LOW COMPLEX 20 MIN: CPT | Mod: PO

## 2023-12-07 PROCEDURE — 97530 THERAPEUTIC ACTIVITIES: CPT | Mod: PO

## 2023-12-07 NOTE — PLAN OF CARE
[facility]   Physical Therapy Initial Evaluation      Date: 12/7/2023   Name: Cristina Curiel  Clinic Number: [id number]    Therapy Diagnosis:    Encounter Diagnoses   Name Primary?    Rotator cuff strain, left, sequela     Acute pain of left shoulder Yes    Post-traumatic stiffness of left shoulder joint       Physician: Ramila Washington MD     Physician Orders: PT Eval and Treat  Medical Diagnosis from Referral: S46.012S (ICD-10-CM) - Rotator cuff strain, left, sequela  Evaluation Date: 12/7/2023  Authorization Period Expiration: 5/30/2024  Plan of Care Expiration: 1/26/2023, 12 VISITS TOTAL - 6 WEEKS  Progress Note Due: 1/24/2023  Visit # / Visits authorized: 1/1   FOTO: 1/3 (last performed on 12/7/2023)    Precautions: Standard  Working status: Out of work    Time In: 11:00 AM  Time Out: 11:55 AM   Total Billable Time (timed & untimed codes): 55 minutes    Subjective       History of current condition - Cristina reports she pulled and strained her left shoulder during work when she was trying to lift heavy objects in 10/25/2023. She feels pain immediately but she is able to continue finish her training. She feels left shoulder pain aggravated a few days later. She was unable to move and raises her shoulder over head. She denied numbness and tingling, no cervical and shoulder injury history.     Imaging: [] Xray [x] MRI [] CT: Performed on: left shoulder   EXAM:  MRI SHOULDER WITHOUT CONTRAST LEFT     CLINICAL HISTORY: Left shoulder pain. Shoulder pain, rotator cuff disorder suspected, xray done; [M67.912]-Unspecified disorder of synovium and tendon, left shoulder.     TECHNIQUE: Standard multiplanar, multisequence MR imaging protocol of the left shoulder was performed.     FINDINGS: Patient motion degrades image quality.     ROTATOR CUFF: Unremarkable.  No full-thickness or significant focal partial-thickness tears.     MUSCULATURE: Within normal limits.     BURSITIS: No significant bursitis.      LONG HEAD BICEPS TENDON: Intact and normal in appearance.     LABRUM and GLENOHUMERAL LIGAMENTS: No discernible tears.     MARROW: No fractures, marrow edema or suspicious bone lesions.     GLENOHUMERAL JOINT: Well-preserved cartilage.  No full-thickness chondral defects.  Normal alignment.     AC JOINT: Normal alignment and appearance.     ACROMIAL ARCH: Type 1 acromion.  No os acromiale.       Pain:  Current 5/10, worst 8/10, best 0/10   Location: [] Right   [x] Left:  shoulder and deltoid   Description: aching , deep, dull, and spasm  Aggravating Factors: shoulder repetitive activities   Easing Factors: activity avoidance, rest    Prior Therapy:   [x] N/A    [] Yes:   Social History: Pt lives with their family  Occupation: Pt is    Prior Level of Function: Independent and pain free with all ADL, IADL, community mobility and functional activities.   Current Level of Function: Independent with all ADL, IADL, community mobility and functional activities with reports of increased pain and need for increased time and frequent breaks.      Dominant Extremity:    [x] Right    [] Left    Pts goals: Pt reported goals are to decrease overall pain levels in order to return to prior functional level.     Medical History:   No past medical history on file.    Surgical History:   Cristina Curiel  has a past surgical history that includes Hernia repair and Anterior cruciate ligament repair.    Medications:   Cristina currently has no medications in their medication list.    Allergies:   Review of patient's allergies indicates:   Allergen Reactions    Pineapple Hives    Zithromax [azithromycin]         Objective      Point tenderness at left upper trapezius, middle and anterior deltoid     Flexion: 95 deg  - passive - 135 deg   Abduction: 98 deg - passive - 129 deg pain stops further movement     Internal rotation: 55 deg   External rotation: 82 deg     Posterior - inferior joint mobilization causes pain     Flexion:  3+/5 abduction: 3+/5,       SENSATION  [x] Intact to Light Touch   [] Impaired:      POSTURE:  Pt presents with postural abnormalities which include:    [x] Forward Head   [] Increased Lumbar Lordosis   [x] Rounded Shoulder   [] Genu Recurvatum   [] Increased Thoracic Kyphosis [] Genu Valgus   [] Trunk Deviated    [] Pes Planus   [] Scapular Winging    [] Other:         Function:     Intake Outcome Measure for FOTO  Survey    Therapist reviewed FOTO scores for Cristina on 12/7/2023.   FOTO documents entered into [employer] - see Media section.    Intake Score: 51%         Treatment     Total Treatment time (time-based codes) separate from Evaluation: (23) minutes     Cristina received the treatments listed below:      UBE 10 min L2.0   HEP   Manual therapy - upper trapezius muscle release     Patient Education and Home Exercises     Education provided: (10) minutes  PURPOSE: Patient educated on the impairments noted above and the effects of physical therapy intervention to improve overall condition and QOL.   EXERCISE: Patient was educated on all the above exercise prior/during/after for proper posture, positioning, and execution for safe performance with home exercise program.   STRENGTH: Patient educated on the importance of improved core and extremity strength in order to improve alignment of the spine and extremities with static positions and dynamic movement.   POSTURE: Patient educated on postural awareness to reduce stress and maintain optimal alignment of the spine with static positions and dynamic movement   SLEEPING POSITIONS: Patient educated on the use of pillows to aid in neutral alignment of spine and extremities when sleeping in supine or side lying.  TRANSFERS & TRANSITIONS: Patient educated on proper technique for bed mobility, transitions and transfers to improve body mechanics and decrease risk of injury.   ERGONOMICS: Patient educated on proper ergonomics at the work station in order to maintain optimal  "alignment of the musculoskeletal system and improve efficiency in the work environment.    Written Home Exercises Provided: yes.  Exercises were reviewed and Cristina was able to demonstrate them prior to the end of the session.  Cristina demonstrated good understanding of the education provided. See EMR under Patient Instructions for exercises provided during therapy sessions.    Assessment     Cristina is a 27 y.o. female referred to outpatient Physical Therapy with a medical diagnosis of S46.012S (ICD-10-CM) - Rotator cuff strain, left, sequela. Pt presents with impairments in the following categories: IMPAIRMENTS: ROM, strength, endurance, joint mobility, muscle length, balance, posture, gait mechanics, core strength and stability, and functional movement patterns    Pt prognosis is Good  Pt will benefit from skilled outpatient Physical Therapy to address the deficits stated above and in the chart below, provide pt/family education, and to maximize pt's level of independence.     Plan of care discussed with patient: Yes  Pt's spiritual, cultural and educational needs considered and patient is agreeable to the plan of care and goals as stated below:     Anticipated Barriers for therapy: none    Medical Necessity is demonstrated by the following  History  Co-morbidities and personal factors that may impact the plan of care [x] LOW: no personal factors / co-morbidities  [] MODERATE: 1-2 personal factors / co-morbidities  [] HIGH: 3+ personal factors / co-morbidities    Moderate / High Support Documentation:   No past medical history on file.     Examination  Body Structures and Functions, activity limitations and participation restrictions that may impact the plan of care [x] LOW: addressing 1-2 elements  [] MODERATE: 3+ elements  [] HIGH: 4+ elements (please support below)    Moderate / High Support Documentation: See above in "Current Level of Function"      Clinical Presentation [x] LOW: stable  [] MODERATE: Evolving  [] " HIGH: Unstable     Decision Making/ Complexity Score: low         Short Term Goals:  2 weeks Status  Date Met   PAIN: Pt will report worst pain of 1/10 in order to progress toward max functional ability and improve quality of life. [x] Progressing  [] Met  [] Not Met    FUNCTION: Patient will demonstrate improved function as indicated by a score of greater than or equal to 55 out of 100 on FOTO. [x] Progressing  [] Met  [] Not Met    MOBILITY: Patient will improve AROM to equal bilaterally in order to progress towards independence with functional activities.  [x] Progressing  [] Met  [] Not Met    STRENGTH: Patient will improve strength to 50% of stated goals, listed in objective measures above, in order to progress towards independence with functional activities. [x] Progressing  [] Met  [] Not Met    POSTURE: Patient will correct postural deviations in sitting and standing, to decrease pain and promote long term stability.  [x] Progressing  [] Met  [] Not Met    HEP: Patient will demonstrate independence with HEP in order to progress toward functional independence. [x] Progressing  [] Met  [] Not Met      Long Term Goals:  6 weeks Status Date Met   PAIN: Pt will report worst pain of 0/10 in order to progress toward max functional ability and improve quality of life [x] Progressing  [] Met  [] Not Met    FUNCTION: Patient will demonstrate improved function as indicated by a score of greater than or equal to 80 out of 100 on FOTO. [x] Progressing  [] Met  [] Not Met    STRENGTH: Patient will improve strength to Manual muscle test 5/5 overall in order to improve functional independence and quality of life.  [x] Progressing  [] Met  [] Not Met    GAIT: Patient will demonstrate normalized gait mechanics with minimal compensation in order to return to PLOF. [x] Progressing  [] Met  [] Not Met    Patient will return to normal ADL's, IADL's, community involvement, recreational activities, and work-related activities with  less than or equal to 0/10 pain and maximal function.  [x] Progressing  [] Met  [] Not Met      Plan     Plan of care Certification: 12/7/2023 to 1/26/2024.    Outpatient Physical Therapy 2 times weekly for 6 weeks to include any combination of the following interventions: virtual visits, dry needling, modalities, electrical stimulation (IFC, Pre-Mod, Attended with Functional Dry Needling), Cervical/Lumbar Traction, Electrical Stimulation IFC, Gait Training, Manual Therapy, Moist Heat/ Ice, Neuromuscular Re-ed, Patient Education, Self Care, Therapeutic Activities, Therapeutic Exercise, and Therapeutic Activites     Faustino Pierson, PT, DPT

## 2023-12-07 NOTE — TELEPHONE ENCOUNTER
----- Message from Genevieve Edge sent at 12/7/2023 12:05 PM CST -----  Type:  note extension     Who Called: pt  Would the patient rather a call back or a response via [employer]? call  Best Call Back Number:  [phone]  Additional Information: note for an extension, patient states she would like a call from the office so that she can further explain what she need

## 2023-12-14 ENCOUNTER — CLINICAL SUPPORT (OUTPATIENT)
Dept: REHABILITATION | Facility: HOSPITAL | Age: 28
End: 2023-12-14
Payer: OTHER MISCELLANEOUS

## 2023-12-14 ENCOUNTER — PATIENT MESSAGE (OUTPATIENT)
Dept: ORTHOPEDICS | Facility: CLINIC | Age: 28
End: 2023-12-14
Payer: COMMERCIAL

## 2023-12-14 DIAGNOSIS — M25.512 ACUTE PAIN OF LEFT SHOULDER: Primary | ICD-10-CM

## 2023-12-14 DIAGNOSIS — M25.612 POST-TRAUMATIC STIFFNESS OF LEFT SHOULDER JOINT: ICD-10-CM

## 2023-12-14 PROCEDURE — 97110 THERAPEUTIC EXERCISES: CPT | Mod: PO

## 2023-12-14 PROCEDURE — 97140 MANUAL THERAPY 1/> REGIONS: CPT | Mod: PO

## 2023-12-14 PROCEDURE — 97112 NEUROMUSCULAR REEDUCATION: CPT | Mod: PO

## 2023-12-14 PROCEDURE — 97530 THERAPEUTIC ACTIVITIES: CPT | Mod: PO

## 2023-12-14 NOTE — PROGRESS NOTES
[facility]   Physical Therapy Treatment Note      Name: Cristina RUFFIN Curiel  Clinic Number: [id number]    Therapy Diagnosis:   Encounter Diagnoses   Name Primary?    Acute pain of left shoulder Yes    Post-traumatic stiffness of left shoulder joint      Physician: Ramila Washington MD    Visit Date: 12/14/2023    Physician Orders: PT Eval and Treat  Medical Diagnosis from Referral: S46.012S (ICD-10-CM) - Rotator cuff strain, left, sequela  Evaluation Date: 12/7/2023  Authorization Period Expiration: 5/30/2024  Plan of Care Expiration: 1/26/2023, 12 VISITS TOTAL - 6 WEEKS  Progress Note Due: 1/24/2023  Visit # / Visits authorized: 1/1, 2/12  FOTO: 1/3 (last performed on 12/7/2023)     Precautions: Standard  Working status: Out of work     Time In: 11:00 AM  Time Out: 11:55 AM   Total Billable Time (timed & untimed codes): 55 minutes    PTA Visit #: 0/5       Subjective     Patient reports: her shoulder is doing better .    She was compliant with home exercise program.  Response to previous treatment: eval  Functional change: n/a    Pain: /10  Location: left shoulder     Objective      Objective Measures updated at progress report unless specified.     Treatment     Cristina received the treatments listed below:      therapeutic exercises to develop strength, endurance, ROM, flexibility, posture, and core stabilization for 23 minutes including:  UBE L2.0 10 min   Pully 3 min + 3 min abduction + flexion   Active shoulder abduction + flexion 10 x3     manual therapy techniques: Joint mobilizations, Manual traction, Myofacial release, Manual Lymphatic Drainage, and Soft tissue Mobilization were applied to the: bilateral upper trapezius, GH posterior-anterior mobilization for 8 minutes      neuromuscular re-education activities to improve: Balance, Coordination, Kinesthetic, Sense, Proprioception, and Posture for 10 minutes. The following activities were included:  Wall slides: 10 x3   Shoulder pendulum:  20c'20cc   Standing pull: GTB 10 x3     therapeutic activities to improve functional performance for 10  minutes, including:  Supine wane flexion: 10 x3   IR; ER: 10  x3 YTB isometric   Scapular squeeze: 10 x3 bTB    hot pack for 10 minutes to cervical and left shoulder while on the UBE - No charge.      Patient Education and Home Exercises       Education provided:   - self-care    Written Home Exercises Provided: yes. Exercises were reviewed and Cristina was able to demonstrate them prior to the end of the session.  Cristina demonstrated good  understanding of the education provided. See Electronic Medical Record under Patient Instructions for exercises provided during therapy sessions    Assessment     Pt presents with good passive flexion but pain with abduction. She states supraspinatus tendon insertion pain during manual therapy. She tolerates most exercises well, no pain post-session. She needs verbal cuing to relax her upper trapezius during standing pulling exercises. Will monitor her symptoms and progress her as tolerate it.     Cristina Is progressing well towards her goals.   Patient prognosis is Good.     Patient will continue to benefit from skilled outpatient physical therapy to address the deficits listed in the problem list box on initial evaluation, provide pt/family education and to maximize pt's level of independence in the home and community environment.     Patient's spiritual, cultural and educational needs considered and pt agreeable to plan of care and goals.     Anticipated barriers to physical therapy:     Short Term Goals:  2 weeks Status  Date Met   PAIN: Pt will report worst pain of 1/10 in order to progress toward max functional ability and improve quality of life. [x] Progressing  [] Met  [] Not Met     FUNCTION: Patient will demonstrate improved function as indicated by a score of greater than or equal to 55 out of 100 on FOTO. [x] Progressing  [] Met  [] Not Met     MOBILITY: Patient will  improve AROM to equal bilaterally in order to progress towards independence with functional activities.  [x] Progressing  [] Met  [] Not Met     STRENGTH: Patient will improve strength to 50% of stated goals, listed in objective measures above, in order to progress towards independence with functional activities. [x] Progressing  [] Met  [] Not Met     POSTURE: Patient will correct postural deviations in sitting and standing, to decrease pain and promote long term stability.  [x] Progressing  [] Met  [] Not Met     HEP: Patient will demonstrate independence with HEP in order to progress toward functional independence. [x] Progressing  [] Met  [] Not Met        Long Term Goals:  6 weeks Status Date Met   PAIN: Pt will report worst pain of 0/10 in order to progress toward max functional ability and improve quality of life [x] Progressing  [] Met  [] Not Met     FUNCTION: Patient will demonstrate improved function as indicated by a score of greater than or equal to 80 out of 100 on FOTO. [x] Progressing  [] Met  [] Not Met     STRENGTH: Patient will improve strength to Manual muscle test 5/5 overall in order to improve functional independence and quality of life.  [x] Progressing  [] Met  [] Not Met     GAIT: Patient will demonstrate normalized gait mechanics with minimal compensation in order to return to PLOF. [x] Progressing  [] Met  [] Not Met     Patient will return to normal ADL's, IADL's, community involvement, recreational activities, and work-related activities with less than or equal to 0/10 pain and maximal function.  [x] Progressing  [] Met  [] Not Met        Plan      Plan of care Certification: 12/7/2023 to 1/26/2024.     Outpatient Physical Therapy 2 times weekly for 6 weeks to include any combination of the following interventions: virtual visits, dry needling, modalities, electrical stimulation (IFC, Pre-Mod, Attended with Functional Dry Needling), Cervical/Lumbar Traction, Electrical Stimulation IFC,  Gait Training, Manual Therapy, Moist Heat/ Ice, Neuromuscular Re-ed, Patient Education, Self Care, Therapeutic Activities, Therapeutic Exercise, and Therapeutic Activites      Faustino Pierson, PT, DPT

## 2023-12-15 ENCOUNTER — CLINICAL SUPPORT (OUTPATIENT)
Dept: REHABILITATION | Facility: HOSPITAL | Age: 28
End: 2023-12-15
Payer: OTHER MISCELLANEOUS

## 2023-12-15 ENCOUNTER — TELEPHONE (OUTPATIENT)
Dept: ORTHOPEDICS | Facility: CLINIC | Age: 28
End: 2023-12-15
Payer: COMMERCIAL

## 2023-12-15 DIAGNOSIS — M25.512 ACUTE PAIN OF LEFT SHOULDER: Primary | ICD-10-CM

## 2023-12-15 PROBLEM — R29.898 WEAKNESS OF BOTH LOWER EXTREMITIES: Status: RESOLVED | Noted: 2022-12-02 | Resolved: 2023-12-15

## 2023-12-15 PROBLEM — R26.9 GAIT ABNORMALITY: Status: RESOLVED | Noted: 2022-12-02 | Resolved: 2023-12-15

## 2023-12-15 PROCEDURE — 97112 NEUROMUSCULAR REEDUCATION: CPT | Mod: PO | Performed by: PHYSICAL THERAPIST

## 2023-12-15 PROCEDURE — 97110 THERAPEUTIC EXERCISES: CPT | Mod: PO | Performed by: PHYSICAL THERAPIST

## 2023-12-15 NOTE — PROGRESS NOTES
[facility]   Physical Therapy Treatment Note      Name: Cristina RUFFIN Curiel  Clinic Number: [id number]    Therapy Diagnosis:   Encounter Diagnosis   Name Primary?    Acute pain of left shoulder Yes       Physician: Ramila Washington MD    Visit Date: 12/15/2023    Physician Orders: PT Eval and Treat  Medical Diagnosis from Referral: S46.012S (ICD-10-CM) - Rotator cuff strain, left, sequela  Evaluation Date: 12/7/2023  Authorization Period Expiration: 5/30/2024  Plan of Care Expiration: 1/26/2024, 12 VISITS TOTAL - 6 WEEKS  Progress Note Due: 1/24/2023  Visit # / Visits authorized:  3/12  FOTO: 1/3 (last performed on 12/7/2023)     Precautions: Standard  Working status: Out of work     Time In: 10:00 AM  Time Out: 10:55 AM   Total Billable Time (timed & untimed codes): 48 minutes    PTA Visit #: 0/5       Subjective     Patient reports: left shoulder pain and burning with movement, even more so if carrying anything.    She was compliant with home exercise program.  Response to previous treatment: eval  Functional change: n/a    Pain: /10  Location: left shoulder     Objective      Objective Measures updated at progress report unless specified.     Treatment     Cristina received the treatments listed below:      therapeutic exercises to develop strength, endurance, ROM, flexibility, posture, and core stabilization for 10 minutes including:    UBE L2.0 8 min   Supine shoulder flexion with wand 30x    Pully 3 min + 3 min abduction + flexion   Active shoulder abduction + flexion 10 x3     manual therapy techniques: Joint mobilizations, Manual traction, Myofacial release, Manual Lymphatic Drainage, and Soft tissue Mobilization were applied to the: bilateral upper trapezius, GH posterior-anterior mobilization for 0 minutes      neuromuscular re-education activities to improve: Balance, Coordination, Kinesthetic, Sense, Proprioception, and Posture for 38 minutes. The following activities were included:    Wall  "slides: 10 x3   Prone rows 3# 3x10  Sidelying ER 0# 3x8  IR; ER: 10  x3 with black strap / ball isometric   Supine serratus punches with wand 20x  Shoulder flexion iso in towel 10" x 10 on wall    therapeutic activities to improve functional performance for 10  minutes, including:        hot pack for 10 minutes to cervical and left shoulder while on the UBE - No charge.      Patient Education and Home Exercises       Education provided:   - self-care    Written Home Exercises Provided: yes. Exercises were reviewed and Cristina was able to demonstrate them prior to the end of the session.  Cristina demonstrated good  understanding of the education provided. See Electronic Medical Record under Patient Instructions for exercises provided during therapy sessions    Assessment     Pt presents with significant discomfort with strengthening therex. Pt feels pain along RTC interval and posterior shoulder. Pt able to tolerate flexibility therex and presents with significant improvement in flexibility in session.     Cristina Is progressing well towards her goals.   Patient prognosis is Good.     Patient will continue to benefit from skilled outpatient physical therapy to address the deficits listed in the problem list box on initial evaluation, provide pt/family education and to maximize pt's level of independence in the home and community environment.     Patient's spiritual, cultural and educational needs considered and pt agreeable to plan of care and goals.     Anticipated barriers to physical therapy:     Short Term Goals:  2 weeks Status  Date Met   PAIN: Pt will report worst pain of 1/10 in order to progress toward max functional ability and improve quality of life. [x] Progressing  [] Met  [] Not Met     FUNCTION: Patient will demonstrate improved function as indicated by a score of greater than or equal to 55 out of 100 on FOTO. [x] Progressing  [] Met  [] Not Met     MOBILITY: Patient will improve AROM to equal bilaterally " in order to progress towards independence with functional activities.  [x] Progressing  [] Met  [] Not Met     STRENGTH: Patient will improve strength to 50% of stated goals, listed in objective measures above, in order to progress towards independence with functional activities. [x] Progressing  [] Met  [] Not Met     POSTURE: Patient will correct postural deviations in sitting and standing, to decrease pain and promote long term stability.  [x] Progressing  [] Met  [] Not Met     HEP: Patient will demonstrate independence with HEP in order to progress toward functional independence. [x] Progressing  [] Met  [] Not Met        Long Term Goals:  6 weeks Status Date Met   PAIN: Pt will report worst pain of 0/10 in order to progress toward max functional ability and improve quality of life [x] Progressing  [] Met  [] Not Met     FUNCTION: Patient will demonstrate improved function as indicated by a score of greater than or equal to 80 out of 100 on FOTO. [x] Progressing  [] Met  [] Not Met     STRENGTH: Patient will improve strength to Manual muscle test 5/5 overall in order to improve functional independence and quality of life.  [x] Progressing  [] Met  [] Not Met     GAIT: Patient will demonstrate normalized gait mechanics with minimal compensation in order to return to PLOF. [x] Progressing  [] Met  [] Not Met     Patient will return to normal ADL's, IADL's, community involvement, recreational activities, and work-related activities with less than or equal to 0/10 pain and maximal function.  [x] Progressing  [] Met  [] Not Met        Plan      Plan of care Certification: 12/7/2023 to 1/26/2024.     Outpatient Physical Therapy 2 times weekly for 6 weeks to include any combination of the following interventions: virtual visits, dry needling, modalities, electrical stimulation (IFC, Pre-Mod, Attended with Functional Dry Needling), Cervical/Lumbar Traction, Electrical Stimulation IFC, Gait Training, Manual Therapy,  Moist Heat/ Ice, Neuromuscular Re-ed, Patient Education, Self Care, Therapeutic Activities, Therapeutic Exercise, and Therapeutic Activites      Faustino Pierson, PT, DPT

## 2023-12-15 NOTE — TELEPHONE ENCOUNTER
----- Message from Howard Tiwari sent at 12/15/2023 10:48 AM CST -----  Pt Requesting Call Back    Who called: pt  Who called for pt:  Best call back #:  [phone]  Add notes: pt said she requests a call back in regards to her leave of absence extension

## 2023-12-18 ENCOUNTER — TELEPHONE (OUTPATIENT)
Dept: ORTHOPEDICS | Facility: CLINIC | Age: 28
End: 2023-12-18
Payer: COMMERCIAL

## 2023-12-18 ENCOUNTER — CLINICAL SUPPORT (OUTPATIENT)
Dept: REHABILITATION | Facility: HOSPITAL | Age: 28
End: 2023-12-18
Payer: OTHER MISCELLANEOUS

## 2023-12-18 DIAGNOSIS — M25.512 ACUTE PAIN OF LEFT SHOULDER: Primary | ICD-10-CM

## 2023-12-18 PROCEDURE — 97010 HOT OR COLD PACKS THERAPY: CPT | Mod: PO

## 2023-12-18 PROCEDURE — 97110 THERAPEUTIC EXERCISES: CPT | Mod: PO

## 2023-12-18 PROCEDURE — 97140 MANUAL THERAPY 1/> REGIONS: CPT | Mod: PO

## 2023-12-18 PROCEDURE — 97112 NEUROMUSCULAR REEDUCATION: CPT | Mod: PO

## 2023-12-18 NOTE — TELEPHONE ENCOUNTER
Patient stated that she did not start her PT until Dec 14 and needs 13 visits per WC.  Will come in on [place] 3 and discuss with .

## 2023-12-18 NOTE — TELEPHONE ENCOUNTER
----- Message from Romeo Lomas IV, MD sent at 12/18/2023  4:22 PM CST -----  I am not in possession of any paperwork.  Can you have her send us what she needs, then I can fill out.   ----- Message -----  From: Elana Yo RN  Sent: 12/14/2023  11:17 AM CST  To: Romeo Lomas IV, MD    Do you have paper work for this patient?   ----- Message -----  From: Howard Tiwari  Sent: 12/14/2023  10:44 AM CST  To: Abebe Walters Staff    Pt Requesting Call Back    Who called: pt  Who called for pt:  Best call back #:  [phone]  Add notes: pt said she is calling regarding getting a call back about her extended leave forms

## 2023-12-18 NOTE — PROGRESS NOTES
[facility]   Physical Therapy Treatment Note      Name: Cristina RUFFIN Curiel  Clinic Number: [id number]    Therapy Diagnosis:   Encounter Diagnosis   Name Primary?    Acute pain of left shoulder Yes       Physician: Ramila Washington MD    Visit Date: 12/18/2023    Physician Orders: PT Eval and Treat  Medical Diagnosis from Referral: S46.012S (ICD-10-CM) - Rotator cuff strain, left, sequela  Evaluation Date: 12/7/2023  Authorization Period Expiration: 5/30/2024  Plan of Care Expiration: 1/26/2024, 12 VISITS TOTAL - 6 WEEKS  Progress Note Due: 1/24/2023  Visit # / Visits authorized:  4/12  FOTO: 1/3 (last performed on 12/7/2023)     Precautions: Standard  Working status: Out of work     Time In: 10:00 AM  Time Out: 10:58 AM   Total Billable Time (timed & untimed codes): 58 minutes    PTA Visit #: 0/5       Subjective     Patient reports: she is still having pain during overhead cabinet cleaning, but is improving.     She was compliant with home exercise program.  Response to previous treatment: eval  Functional change: n/a    Pain: /10  Location: left shoulder     Objective      Objective Measures updated at progress report unless specified.     Treatment     Cristina received the treatments listed below:      therapeutic exercises to develop strength, endurance, ROM, flexibility, posture, and core stabilization for 23 minutes including:    UBE L2.0 10 min   Abduction with wand 30x  Pully 3 min + 3 min abduction + flexion   Active shoulder abduction + flexion 10 x3     manual therapy techniques: Joint mobilizations, Manual traction, Myofacial release, Manual Lymphatic Drainage, and Soft tissue Mobilization were applied to the: bilateral upper trapezius, GH posterior-anterior mobilization for 10 minutes      neuromuscular re-education activities to improve: Balance, Coordination, Kinesthetic, Sense, Proprioception, and Posture for 23 minutes. The following activities were included:    Wall slides: 10 x3  flexion and   Prone rows 3# 3x10  Biceps curl with overhead push 4# 10 x3   IR; ER: 10  x3 YTB 90 deg  Supine serratus punches with wand 20x  Wall YTI YTB: 6 x3     therapeutic activities to improve functional performance for 0  minutes, including:      hot pack for 10 minutes to cervical and left shoulder while on the UBE       Patient Education and Home Exercises       Education provided:   - self-care    Written Home Exercises Provided: yes. Exercises were reviewed and Cristina was able to demonstrate them prior to the end of the session.  Cristina demonstrated good  understanding of the education provided. See Electronic Medical Record under Patient Instructions for exercises provided during therapy sessions    Assessment     Pt presents with less discomfort with strengthening therex. Shoulder ER causes pain and. Manual therapy showed point tenderness at RTC. Pt able to tolerate flexibility therex and presents with significant improvement in flexibility in session.     Cristina Is progressing well towards her goals.   Patient prognosis is Good.     Patient will continue to benefit from skilled outpatient physical therapy to address the deficits listed in the problem list box on initial evaluation, provide pt/family education and to maximize pt's level of independence in the home and community environment.     Patient's spiritual, cultural and educational needs considered and pt agreeable to plan of care and goals.     Anticipated barriers to physical therapy:     Short Term Goals:  2 weeks Status  Date Met   PAIN: Pt will report worst pain of 1/10 in order to progress toward max functional ability and improve quality of life. [x] Progressing  [] Met  [] Not Met     FUNCTION: Patient will demonstrate improved function as indicated by a score of greater than or equal to 55 out of 100 on FOTO. [x] Progressing  [] Met  [] Not Met     MOBILITY: Patient will improve AROM to equal bilaterally in order to progress towards  independence with functional activities.  [x] Progressing  [] Met  [] Not Met     STRENGTH: Patient will improve strength to 50% of stated goals, listed in objective measures above, in order to progress towards independence with functional activities. [x] Progressing  [] Met  [] Not Met     POSTURE: Patient will correct postural deviations in sitting and standing, to decrease pain and promote long term stability.  [x] Progressing  [] Met  [] Not Met     HEP: Patient will demonstrate independence with HEP in order to progress toward functional independence. [x] Progressing  [] Met  [] Not Met        Long Term Goals:  6 weeks Status Date Met   PAIN: Pt will report worst pain of 0/10 in order to progress toward max functional ability and improve quality of life [x] Progressing  [] Met  [] Not Met     FUNCTION: Patient will demonstrate improved function as indicated by a score of greater than or equal to 80 out of 100 on FOTO. [x] Progressing  [] Met  [] Not Met     STRENGTH: Patient will improve strength to Manual muscle test 5/5 overall in order to improve functional independence and quality of life.  [x] Progressing  [] Met  [] Not Met     GAIT: Patient will demonstrate normalized gait mechanics with minimal compensation in order to return to PLOF. [x] Progressing  [] Met  [] Not Met     Patient will return to normal ADL's, IADL's, community involvement, recreational activities, and work-related activities with less than or equal to 0/10 pain and maximal function.  [x] Progressing  [] Met  [] Not Met        Plan      Plan of care Certification: 12/7/2023 to 1/26/2024.     Outpatient Physical Therapy 2 times weekly for 6 weeks to include any combination of the following interventions: virtual visits, dry needling, modalities, electrical stimulation (IFC, Pre-Mod, Attended with Functional Dry Needling), Cervical/Lumbar Traction, Electrical Stimulation IFC, Gait Training, Manual Therapy, Moist Heat/ Ice, Neuromuscular  Re-ed, Patient Education, Self Care, Therapeutic Activities, Therapeutic Exercise, and Therapeutic Activites      Faustino Pierson, PT, DPT

## 2023-12-20 ENCOUNTER — CLINICAL SUPPORT (OUTPATIENT)
Dept: REHABILITATION | Facility: HOSPITAL | Age: 28
End: 2023-12-20
Payer: OTHER MISCELLANEOUS

## 2023-12-20 DIAGNOSIS — M25.512 ACUTE PAIN OF LEFT SHOULDER: Primary | ICD-10-CM

## 2023-12-20 PROCEDURE — 97110 THERAPEUTIC EXERCISES: CPT | Mod: PO

## 2023-12-20 PROCEDURE — 97112 NEUROMUSCULAR REEDUCATION: CPT | Mod: PO

## 2023-12-20 PROCEDURE — 97140 MANUAL THERAPY 1/> REGIONS: CPT | Mod: PO

## 2023-12-20 NOTE — PROGRESS NOTES
[facility]   Physical Therapy Treatment Note      Name: Cristina RUFFIN Curiel  Clinic Number: [id number]    Therapy Diagnosis:   Encounter Diagnosis   Name Primary?    Acute pain of left shoulder Yes       Physician: Ramila Washington MD    Visit Date: 12/20/2023    Physician Orders: PT Eval and Treat  Medical Diagnosis from Referral: S46.012S (ICD-10-CM) - Rotator cuff strain, left, sequela  Evaluation Date: 12/7/2023  Authorization Period Expiration: 5/30/2024  Plan of Care Expiration: 1/26/2024, 12 VISITS TOTAL - 6 WEEKS  Progress Note Due: 1/24/2023  Visit # / Visits authorized:  5/12   FOTO: 1/3 (last performed on 12/7/2023)     Precautions: Standard  Working status: Out of work     Time In: 9:00 AM  Time Out: 9:50 AM   Total Billable Time (timed & untimed codes): 50 minutes    PTA Visit #: 0/5       Subjective     Patient reports: she feels much better, her cervical - thoracic junction joint is tightness and causing pain during bending.     She was compliant with home exercise program.  Response to previous treatment: eval  Functional change: n/a    Pain: /10  Location: left shoulder     Objective      Objective Measures updated at progress report unless specified.     Treatment     Cristina received the treatments listed below:      therapeutic exercises to develop strength, endurance, ROM, flexibility, posture, and core stabilization for 15 minutes including:    UBE L2.0 10 min   Abduction with wand 30x  Pully 3 min + 3 min abduction + flexion   Standing pull: 10 x3 GTB  Triceps pull down: 10 x3 GTB    manual therapy techniques: Joint mobilizations, Manual traction, Myofacial release, Manual Lymphatic Drainage, and Soft tissue Mobilization were applied to the: bilateral upper trapezius, GH posterior-anterior mobilization for 8 minutes  CTJ + thoracic spinal manipulation       neuromuscular re-education activities to improve: Balance, Coordination, Kinesthetic, Sense, Proprioception, and Posture  "for 23 minutes. The following activities were included:    Wall slides: 10 x3 flexion and   Shoulder shrug 4# 3x10   Biceps curl with overhead push 4# 10 x3   IR; ER: 10  x3 YTB 90 deg  Wall YTI YTB: 6 x3     therapeutic activities to improve functional performance for 0  minutes, including:      Patient Education and Home Exercises       Education provided:   - self-care    Written Home Exercises Provided: yes. Exercises were reviewed and Cristina was able to demonstrate them prior to the end of the session.  Cristina demonstrated good  understanding of the education provided. See Electronic Medical Record under Patient Instructions for exercises provided during therapy sessions    Assessment     Pt performed shoulder ER with less pain this time. No muscle tightness on cervical and shoulder. CTJ and thoracic manipulation cause "pop". Pt responds well to manual therapy. Pt able to tolerate flexibility therex and presents with significant improvement in range of motion in this session.     Cristina Is progressing well towards her goals.   Patient prognosis is Good.     Patient will continue to benefit from skilled outpatient physical therapy to address the deficits listed in the problem list box on initial evaluation, provide pt/family education and to maximize pt's level of independence in the home and community environment.     Patient's spiritual, cultural and educational needs considered and pt agreeable to plan of care and goals.     Anticipated barriers to physical therapy:     Short Term Goals:  2 weeks Status  Date Met   PAIN: Pt will report worst pain of 1/10 in order to progress toward max functional ability and improve quality of life. [x] Progressing  [] Met  [] Not Met     FUNCTION: Patient will demonstrate improved function as indicated by a score of greater than or equal to 55 out of 100 on FOTO. [x] Progressing  [] Met  [] Not Met     MOBILITY: Patient will improve AROM to equal bilaterally in order to " progress towards independence with functional activities.  [x] Progressing  [] Met  [] Not Met     STRENGTH: Patient will improve strength to 50% of stated goals, listed in objective measures above, in order to progress towards independence with functional activities. [x] Progressing  [] Met  [] Not Met     POSTURE: Patient will correct postural deviations in sitting and standing, to decrease pain and promote long term stability.  [x] Progressing  [] Met  [] Not Met     HEP: Patient will demonstrate independence with HEP in order to progress toward functional independence. [x] Progressing  [] Met  [] Not Met        Long Term Goals:  6 weeks Status Date Met   PAIN: Pt will report worst pain of 0/10 in order to progress toward max functional ability and improve quality of life [x] Progressing  [] Met  [] Not Met     FUNCTION: Patient will demonstrate improved function as indicated by a score of greater than or equal to 80 out of 100 on FOTO. [x] Progressing  [] Met  [] Not Met     STRENGTH: Patient will improve strength to Manual muscle test 5/5 overall in order to improve functional independence and quality of life.  [x] Progressing  [] Met  [] Not Met     GAIT: Patient will demonstrate normalized gait mechanics with minimal compensation in order to return to PLOF. [x] Progressing  [] Met  [] Not Met     Patient will return to normal ADL's, IADL's, community involvement, recreational activities, and work-related activities with less than or equal to 0/10 pain and maximal function.  [x] Progressing  [] Met  [] Not Met        Plan      Plan of care Certification: 12/7/2023 to 1/26/2024.     Outpatient Physical Therapy 2 times weekly for 6 weeks to include any combination of the following interventions: virtual visits, dry needling, modalities, electrical stimulation (IFC, Pre-Mod, Attended with Functional Dry Needling), Cervical/Lumbar Traction, Electrical Stimulation IFC, Gait Training, Manual Therapy, Moist Heat/  Ice, Neuromuscular Re-ed, Patient Education, Self Care, Therapeutic Activities, Therapeutic Exercise, and Therapeutic Activites      Faustino Pierson, PT, DPT

## 2023-12-27 ENCOUNTER — CLINICAL SUPPORT (OUTPATIENT)
Dept: REHABILITATION | Facility: HOSPITAL | Age: 28
End: 2023-12-27
Payer: OTHER MISCELLANEOUS

## 2023-12-27 DIAGNOSIS — M25.512 ACUTE PAIN OF LEFT SHOULDER: Primary | ICD-10-CM

## 2023-12-27 PROCEDURE — 97112 NEUROMUSCULAR REEDUCATION: CPT | Mod: PO,CQ

## 2023-12-27 PROCEDURE — 97110 THERAPEUTIC EXERCISES: CPT | Mod: PO,CQ

## 2023-12-27 NOTE — PROGRESS NOTES
[facility]   Physical Therapy Treatment Note      Name: Cristina RUFFIN Curiel  Clinic Number: [id number]    Therapy Diagnosis:   Encounter Diagnosis   Name Primary?    Acute pain of left shoulder Yes         Physician: Ramila Washington MD    Visit Date: 12/27/2023    Physician Orders: PT Eval and Treat  Medical Diagnosis from Referral: S46.012S (ICD-10-CM) - Rotator cuff strain, left, sequela  Evaluation Date: 12/7/2023  Authorization Period Expiration: 5/30/2024  Plan of Care Expiration: 1/26/2024, 12 VISITS TOTAL - 6 WEEKS  Progress Note Due: 1/24/2023  Visit # / Visits authorized:  5/12   FOTO: 1/3 (last performed on 12/7/2023)     Precautions: Standard  Working status: Out of work     Time In: 1:00 PM  Time Out: 1:55 PM   Total Billable Time (timed & untimed codes): 55 minutes    PTA Visit #: 1/5       Subjective     Patient reports: everything seems to be doing a little bit better.     She was compliant with home exercise program.  Response to previous treatment: eval  Functional change: n/a    Pain: /10  Location: left shoulder     Objective      Objective Measures updated at progress report unless specified.     Treatment     Cristina received the treatments listed below:      therapeutic exercises to develop strength, endurance, ROM, flexibility, posture, and core stabilization for 25 minutes including:    UBE L2.0 10 min   Abduction with wand 30 x  Pully 3 min + 3 min abduction + flexion   Standing pull: 10 x3 GTB  Triceps pull down: 10 x3 GTB    manual therapy techniques: Joint mobilizations, Manual traction, Myofacial release, Manual Lymphatic Drainage, and Soft tissue Mobilization were applied to the: bilateral upper trapezius, GH posterior-anterior mobilization for 0 minutes  CTJ + thoracic spinal manipulation       neuromuscular re-education activities to improve: Balance, Coordination, Kinesthetic, Sense, Proprioception, and Posture for 30 minutes. The following activities were  included:    Wall slides: 10 x3 flexion and   Shoulder shrug 4# 3x10   Biceps curl with overhead push 4# 10 x3   IR; ER: 10  x3 YTB 90 deg  Wall YTI YTB: 6 x 3     therapeutic activities to improve functional performance for 0  minutes, including:      Patient Education and Home Exercises       Education provided:   - self-care    Written Home Exercises Provided: yes. Exercises were reviewed and Cristina was able to demonstrate them prior to the end of the session.  Cristina demonstrated good  understanding of the education provided. See Electronic Medical Record under Patient Instructions for exercises provided during therapy sessions    Assessment     Pt still with some RTC weakness as well as discomfort when performing RTC strengthening exercises.  Pt required verbal as well as tactile cueing on proper scap setting technique.  Will continue to monitor and progress pt as tolerated.      Cristina Is progressing well towards her goals.   Patient prognosis is Good.     Patient will continue to benefit from skilled outpatient physical therapy to address the deficits listed in the problem list box on initial evaluation, provide pt/family education and to maximize pt's level of independence in the home and community environment.     Patient's spiritual, cultural and educational needs considered and pt agreeable to plan of care and goals.     Anticipated barriers to physical therapy:     Short Term Goals:  2 weeks Status  Date Met   PAIN: Pt will report worst pain of 1/10 in order to progress toward max functional ability and improve quality of life. [x] Progressing  [] Met  [] Not Met     FUNCTION: Patient will demonstrate improved function as indicated by a score of greater than or equal to 55 out of 100 on FOTO. [x] Progressing  [] Met  [] Not Met     MOBILITY: Patient will improve AROM to equal bilaterally in order to progress towards independence with functional activities.  [x] Progressing  [] Met  [] Not Met     STRENGTH:  Patient will improve strength to 50% of stated goals, listed in objective measures above, in order to progress towards independence with functional activities. [x] Progressing  [] Met  [] Not Met     POSTURE: Patient will correct postural deviations in sitting and standing, to decrease pain and promote long term stability.  [x] Progressing  [] Met  [] Not Met     HEP: Patient will demonstrate independence with HEP in order to progress toward functional independence. [x] Progressing  [] Met  [] Not Met        Long Term Goals:  6 weeks Status Date Met   PAIN: Pt will report worst pain of 0/10 in order to progress toward max functional ability and improve quality of life [x] Progressing  [] Met  [] Not Met     FUNCTION: Patient will demonstrate improved function as indicated by a score of greater than or equal to 80 out of 100 on FOTO. [x] Progressing  [] Met  [] Not Met     STRENGTH: Patient will improve strength to Manual muscle test 5/5 overall in order to improve functional independence and quality of life.  [x] Progressing  [] Met  [] Not Met     GAIT: Patient will demonstrate normalized gait mechanics with minimal compensation in order to return to PLOF. [x] Progressing  [] Met  [] Not Met     Patient will return to normal ADL's, IADL's, community involvement, recreational activities, and work-related activities with less than or equal to 0/10 pain and maximal function.  [x] Progressing  [] Met  [] Not Met        Plan      Plan of care Certification: 12/7/2023 to 1/26/2024.     Outpatient Physical Therapy 2 times weekly for 6 weeks to include any combination of the following interventions: virtual visits, dry needling, modalities, electrical stimulation (IFC, Pre-Mod, Attended with Functional Dry Needling), Cervical/Lumbar Traction, Electrical Stimulation IFC, Gait Training, Manual Therapy, Moist Heat/ Ice, Neuromuscular Re-ed, Patient Education, Self Care, Therapeutic Activities, Therapeutic Exercise, and  Therapeutic Activites      Bishnu Hickey, PTA

## 2023-12-28 ENCOUNTER — CLINICAL SUPPORT (OUTPATIENT)
Dept: REHABILITATION | Facility: HOSPITAL | Age: 28
End: 2023-12-28
Payer: OTHER MISCELLANEOUS

## 2023-12-28 DIAGNOSIS — M25.512 ACUTE PAIN OF LEFT SHOULDER: Primary | ICD-10-CM

## 2023-12-28 PROCEDURE — 97110 THERAPEUTIC EXERCISES: CPT | Mod: PO

## 2023-12-28 PROCEDURE — 97112 NEUROMUSCULAR REEDUCATION: CPT | Mod: PO

## 2023-12-28 PROCEDURE — 97140 MANUAL THERAPY 1/> REGIONS: CPT | Mod: PO

## 2023-12-28 NOTE — PROGRESS NOTES
[facility]   Physical Therapy Treatment Note      Name: Cristina RUFFIN Curiel  Clinic Number: [id number]    Therapy Diagnosis:   Encounter Diagnosis   Name Primary?    Acute pain of left shoulder Yes       Physician: Ramila Washington MD    Visit Date: 12/28/2023    Physician Orders: PT Eval and Treat  Medical Diagnosis from Referral: S46.012S (ICD-10-CM) - Rotator cuff strain, left, sequela  Evaluation Date: 12/7/2023  Authorization Period Expiration: 5/30/2024  Plan of Care Expiration: 1/26/2024, 12 VISITS TOTAL - 6 WEEKS  Progress Note Due: 1/24/2023  Visit # / Visits authorized:  6/12 +1 eval  FOTO: 1/3 (last performed on 12/7/2023)     Precautions: Standard  Working status: Out of work     Time In: 10:00 AM  Time Out: 10:55 AM   Total Billable Time (timed & untimed codes): 55 minutes    PTA Visit #: 0/5       Subjective     Patient reports: No new symptoms to concern at this moment.     She was compliant with home exercise program.  Response to previous treatment: eval  Functional change: n/a    Pain: /10  Location: left shoulder     Objective      Objective Measures updated at progress report unless specified.     Treatment     Cristina received the treatments listed below:      therapeutic exercises to develop strength, endurance, ROM, flexibility, posture, and core stabilization for 23 minutes including:    UBE L2.0 10 min   Standing pull: 10 x3 GTB  Triceps pull down: 10 x3 GTB  Shoulder flexion stretch: 25secx 3   Shoulder flexion + abduction 2 x10 beth     manual therapy techniques: Joint mobilizations, Manual traction, Myofacial release, Manual Lymphatic Drainage, and Soft tissue Mobilization were applied to the: bilateral upper trapezius, GH posterior-anterior mobilization for 8 minutes      neuromuscular re-education activities to improve: Balance, Coordination, Kinesthetic, Sense, Proprioception, and Posture for 23 minutes. The following activities were included:    Iso shoulder ER: 10  x3  Farmer carry 15# 1 min x3   Biceps curl with overhead push 4# 10 x3   Wall YTI YTB: 6 x 3   Biceps curl + overhead: 10 x3     therapeutic activities to improve functional performance for 0  minutes, including:      Patient Education and Home Exercises       Education provided:   - self-care    Written Home Exercises Provided: yes. Exercises were reviewed and Cristina was able to demonstrate them prior to the end of the session.  Cristina demonstrated good  understanding of the education provided. See Electronic Medical Record under Patient Instructions for exercises provided during therapy sessions    Assessment     Pt still with some RTC weakness, especially external rotator.  Pt required verbal as well as tactile cueing on proper scap setting technique. Passive shoulder abduction with clicking sound at last 20 degrees. Pt responds well to manual therapy. Will continue to monitor and progress pt as tolerated.      Cristina Is progressing well towards her goals.   Patient prognosis is Good.     Patient will continue to benefit from skilled outpatient physical therapy to address the deficits listed in the problem list box on initial evaluation, provide pt/family education and to maximize pt's level of independence in the home and community environment.     Patient's spiritual, cultural and educational needs considered and pt agreeable to plan of care and goals.     Anticipated barriers to physical therapy:     Short Term Goals:  2 weeks Status  Date Met   PAIN: Pt will report worst pain of 1/10 in order to progress toward max functional ability and improve quality of life. [x] Progressing  [] Met  [] Not Met     FUNCTION: Patient will demonstrate improved function as indicated by a score of greater than or equal to 55 out of 100 on FOTO. [x] Progressing  [] Met  [] Not Met     MOBILITY: Patient will improve AROM to equal bilaterally in order to progress towards independence with functional activities.  [x] Progressing  []  Met  [] Not Met     STRENGTH: Patient will improve strength to 50% of stated goals, listed in objective measures above, in order to progress towards independence with functional activities. [x] Progressing  [] Met  [] Not Met     POSTURE: Patient will correct postural deviations in sitting and standing, to decrease pain and promote long term stability.  [x] Progressing  [] Met  [] Not Met     HEP: Patient will demonstrate independence with HEP in order to progress toward functional independence. [x] Progressing  [] Met  [] Not Met        Long Term Goals:  6 weeks Status Date Met   PAIN: Pt will report worst pain of 0/10 in order to progress toward max functional ability and improve quality of life [x] Progressing  [] Met  [] Not Met     FUNCTION: Patient will demonstrate improved function as indicated by a score of greater than or equal to 80 out of 100 on FOTO. [x] Progressing  [] Met  [] Not Met     STRENGTH: Patient will improve strength to Manual muscle test 5/5 overall in order to improve functional independence and quality of life.  [x] Progressing  [] Met  [] Not Met     GAIT: Patient will demonstrate normalized gait mechanics with minimal compensation in order to return to PLOF. [x] Progressing  [] Met  [] Not Met     Patient will return to normal ADL's, IADL's, community involvement, recreational activities, and work-related activities with less than or equal to 0/10 pain and maximal function.  [x] Progressing  [] Met  [] Not Met        Plan      Plan of care Certification: 12/7/2023 to 1/26/2024.     Outpatient Physical Therapy 2 times weekly for 6 weeks to include any combination of the following interventions: virtual visits, dry needling, modalities, electrical stimulation (IFC, Pre-Mod, Attended with Functional Dry Needling), Cervical/Lumbar Traction, Electrical Stimulation IFC, Gait Training, Manual Therapy, Moist Heat/ Ice, Neuromuscular Re-ed, Patient Education, Self Care, Therapeutic Activities,  Therapeutic Exercise, and Therapeutic Activites      Faustino Pierson, PT, DPT

## 2024-01-03 ENCOUNTER — OFFICE VISIT (OUTPATIENT)
Dept: ORTHOPEDICS | Facility: CLINIC | Age: 29
End: 2024-01-03
Payer: OTHER MISCELLANEOUS

## 2024-01-03 VITALS
SYSTOLIC BLOOD PRESSURE: 132 MMHG | DIASTOLIC BLOOD PRESSURE: 82 MMHG | BODY MASS INDEX: 21.53 KG/M2 | HEART RATE: 73 BPM | WEIGHT: 121.5 LBS | HEIGHT: 63 IN

## 2024-01-03 DIAGNOSIS — M25.512 LEFT SHOULDER PAIN, UNSPECIFIED CHRONICITY: ICD-10-CM

## 2024-01-03 DIAGNOSIS — M75.22 BICEPS TENDONITIS ON LEFT: ICD-10-CM

## 2024-01-03 DIAGNOSIS — S46.012S ROTATOR CUFF STRAIN, LEFT, SEQUELA: Primary | ICD-10-CM

## 2024-01-03 PROCEDURE — 20610 DRAIN/INJ JOINT/BURSA W/O US: CPT | Mod: LT,S$GLB,, | Performed by: ORTHOPAEDIC SURGERY

## 2024-01-03 PROCEDURE — 99999 PR PBB SHADOW E&M-EST. PATIENT-LVL III: CPT | Mod: PBBFAC,,, | Performed by: ORTHOPAEDIC SURGERY

## 2024-01-03 PROCEDURE — 99214 OFFICE O/P EST MOD 30 MIN: CPT | Mod: 25,S$GLB,, | Performed by: ORTHOPAEDIC SURGERY

## 2024-01-03 RX ORDER — TRIAMCINOLONE ACETONIDE 40 MG/ML
40 INJECTION, SUSPENSION INTRA-ARTICULAR; INTRAMUSCULAR
Status: DISCONTINUED | OUTPATIENT
Start: 2024-01-03 | End: 2024-01-03 | Stop reason: HOSPADM

## 2024-01-03 RX ADMIN — TRIAMCINOLONE ACETONIDE 40 MG: 40 INJECTION, SUSPENSION INTRA-ARTICULAR; INTRAMUSCULAR at 03:01

## 2024-01-03 NOTE — PROGRESS NOTES
[facility], Orthopedics and Sports Medicine  [facility]    Established Patient Shoulder Office Visit  01/03/2024     Diagnosis:  Left rotator cuff strain  Biceps tendonitis       Subjective:      Cristina Curiel is a 28 y.o. female who returns for follow up treatment of the left shoulder problem.    At the last office visit the patient was prescribed physical therapy, which has improved the symptoms.      The patient has had advanced imaging of the shoulder.     The patient continues with the following symptoms: pain and weakness.  The symptoms are improving. Symptoms are located posterior and superior and anterior left shoulder.  Symptoms are improving with therapy but still problematic.  Has done about 3 weeks of PT at this time.      Outside reports reviewed: historical medical records and office notes.    History reviewed. No pertinent past medical history.    Patient Active Problem List   Diagnosis    Wrist pain, acute, right    Acute pain of left shoulder    Post-traumatic stiffness of left shoulder joint       Past Surgical History:   Procedure Laterality Date    ANTERIOR CRUCIATE LIGAMENT REPAIR      HERNIA REPAIR          No current outpatient medications     Review of patient's allergies indicates:   Allergen Reactions    Pineapple Hives    Zithromax [azithromycin]        Social History     Socioeconomic History    Marital status: Single   Tobacco Use    Smoking status: Some Days     Types: Vaping with nicotine    Smokeless tobacco: Never   Substance and Sexual Activity    Alcohol use: Yes     Alcohol/week: 1.0 standard drink of alcohol     Types: 1 Drinks containing 0.5 oz of alcohol per week     Comment: occas    Drug use: Not Currently     Types: Marijuana     Comment: daily-    Sexual activity: Yes     Partners: Female     Birth control/protection: None, Implant     Comment: Condom sometimes       Family History   Problem Relation Age of Onset    Diabetes Mother     Breast cancer  Neg Hx     Ovarian cancer Neg Hx     Colon cancer Neg Hx          Review of Systems   Constitutional: Negative for fever.   HENT:  Negative for congestion.    Eyes:  Negative for blurred vision.   Cardiovascular:  Negative for chest pain and orthopnea.   Musculoskeletal:  Positive for myalgias and stiffness. Negative for back pain, falls, joint pain, joint swelling, muscle cramps, muscle weakness and neck pain.   Gastrointestinal:  Negative for abdominal pain.   Neurological:  Negative for sensory change.        Objective:      General    Nursing note and vitals reviewed.  Constitutional: She is oriented to person, place, and time. She appears well-developed and well-nourished. No distress.   HENT:   Head: Normocephalic and atraumatic.   Eyes: EOM are normal. Pupils are equal, round, and reactive to light.   Cardiovascular:  Normal rate and regular rhythm.            Pulmonary/Chest: Effort normal and breath sounds normal. No respiratory distress.   Abdominal: Soft.   Neurological: She is alert and oriented to person, place, and time.   Psychiatric: She has a normal mood and affect. Her behavior is normal.         Back (L-Spine & T-Spine) / Neck (C-Spine) Exam     Tenderness   The patient is tender to palpation of the left trapezial.   Right Shoulder Exam   Right shoulder exam is normal.    Inspection/Observation   Swelling: absent  Bruising: absent  Atrophy: absent    Tenderness   The patient is experiencing no tenderness.    Range of Motion   Active abduction:  170 normal   Forward Flexion:  170 normal   External Rotation 0 degrees:  50 normal   Internal rotation 0 degrees:  T8 normal     Tests & Signs   Drop arm: negative  Doss test: negative  Impingement: negative  Belly Press: negative  Active Compression Test (Mandan's Sign): negative  Speed's Test: negative    Other   Sensation: normal    Comments:  Sukhi test- negative    Left Shoulder Exam     Inspection/Observation   Swelling: absent  Bruising:  absent  Atrophy: absent    Tenderness   The patient is tender to palpation of the biceps tendon.    Range of Motion   Active abduction:  170 abnormal   Forward Flexion:  170 abnormal   External Rotation 0 degrees:  50 normal   Internal rotation 0 degrees:  T8 normal     Tests & Signs   Drop arm: positive  Doss test: positive  Impingement: positive  Rotator Cuff Painful Arc/Range: mild  Belly Press: negative  Active Compression test (Lewellen's Sign): negative  Speed's Test: negative    Other   Sensation: normal     Comments:  Sukhi test- negative        Muscle Strength   Right Upper Extremity   Shoulder Abduction: 5/5   Shoulder Internal Rotation: 5/5   Shoulder External Rotation: 5/5   Biceps: 5/5   Left Upper Extremity  Shoulder Abduction: 4/5   Shoulder Internal Rotation: 5/5   Shoulder External Rotation: 4/5   Biceps: 5/5     Reflexes     Left Side  Biceps:  2+  Triceps:  2+  Brachioradialis:  2+  Left Washington's Sign:  Absent    Right Side   Biceps:  2+  Triceps:  2+  Brachioradialis:  2+  Right Washington's Sign:  absent    Vascular Exam     Right Pulses      Radial:                    2+      Left Pulses      Radial:                    2+        Imaging:  None today     Large Joint Aspiration/Injection: L subacromial bursa    Date/Time: 1/3/2024 3:15 PM    Performed by: Romeo Lomas IV, MD  Authorized by: Romeo Lomas IV, MD    Consent Done?:  Yes (Verbal)  Indications:  Pain  Site marked: the procedure site was marked    Timeout: prior to procedure the correct patient, procedure, and site was verified    Prep: patient was prepped and draped in usual sterile fashion      Local anesthesia used?: Yes    Local anesthetic:  Lidocaine 1% without epinephrine    Details:  Needle Size:  22 G  Ultrasonic Guidance for needle placement?: No    Approach:  Lateral  Location:  Shoulder  Site:  L subacromial bursa  Medications:  40 mg triamcinolone acetonide 40 mg/mL  Patient tolerance:  Patient tolerated the procedure  well with no immediate complications        Assessment:       Cristina Curiel is a 28 y.o. female seen in the office today. The primary encounter diagnosis was Rotator cuff strain, left, sequela. Diagnoses of Biceps tendonitis on left and Left shoulder pain, unspecified chronicity were also pertinent to this visit.  Non-operative treatment is recommended at this time. The patient was complaining of rotator cuff type pain today so was given a SA CSI.  Will continue out of work.  Continue therapy at this time.  Will follow up in 6 weeks for reevaluation. The natural history and expected course discussed with patient. Various treatment options were discussed, including their risks and benefits. All of the patient's questions were answered.     Plan:      Continue physical therapy as currently ordered.   Tylenol 650mg TID, PRN pain.  Follow up in 6 weeks.   Corticosteroid injection given today (left subacromial).         Romeo Lomas IV, MD   of Clinical Orthopedics  Department of Orthopedic Surgery  [facility]  Office: [phone]  Website: [email or web address]      Orders Placed This Encounter    Large Joint Aspiration/Injection

## 2024-01-03 NOTE — LETTER
January 3, 2024      [facility]  [address]  [address]  [place] [address]  Phone: [phone]  Fax: [phone]       Patient: Cristina Curiel   YOB: 1995  Date of Visit: 01/03/2024    To Whom It May Concern:    Carmelita Curiel  was at [facility] on 01/03/2024. The patient may not return to work at this time.  Next evaluation will be in approximately 6 weeks where we will determine if she is able to return to work.  If you have any questions or concerns, or if I can be of further assistance, please do not hesitate to contact me.    Sincerely,      Romeo Lomas IV, MD

## 2024-01-04 ENCOUNTER — CLINICAL SUPPORT (OUTPATIENT)
Dept: REHABILITATION | Facility: HOSPITAL | Age: 29
End: 2024-01-04
Payer: OTHER MISCELLANEOUS

## 2024-01-04 DIAGNOSIS — M25.612 POST-TRAUMATIC STIFFNESS OF LEFT SHOULDER JOINT: ICD-10-CM

## 2024-01-04 DIAGNOSIS — M25.512 ACUTE PAIN OF LEFT SHOULDER: Primary | ICD-10-CM

## 2024-01-04 PROCEDURE — 97110 THERAPEUTIC EXERCISES: CPT | Mod: PO

## 2024-01-04 PROCEDURE — 97530 THERAPEUTIC ACTIVITIES: CPT | Mod: PO

## 2024-01-04 PROCEDURE — 97112 NEUROMUSCULAR REEDUCATION: CPT | Mod: PO

## 2024-01-04 NOTE — PROGRESS NOTES
[facility]   Physical Therapy Treatment Note      Name: Cristina Curiel  Clinic Number: [id number]    Therapy Diagnosis:   Encounter Diagnoses   Name Primary?    Acute pain of left shoulder Yes    Post-traumatic stiffness of left shoulder joint        Physician: Ramila Washington MD    Visit Date: 1/4/2024    Physician Orders: PT Eval and Treat  Medical Diagnosis from Referral: S46.012S (ICD-10-CM) - Rotator cuff strain, left, sequela  Evaluation Date: 12/7/2023  Authorization Period Expiration: 5/30/2024  Plan of Care Expiration: 1/26/2024, 12 VISITS TOTAL - 6 WEEKS  Progress Note Due: 1/24/2023  Visit # / Visits authorized:  7/12 +1 eval  FOTO: 2/3 (last performed on 1/4/2024) - 61%     Precautions: Standard  Working status: Out of work     Time In: 10:00 AM  Time Out: 10:50 AM   Total Billable Time (timed & untimed codes): 50 minutes    PTA Visit #: 0/5       Subjective     Patient reports: she received steroids injection yesterday. Her MD wants her to continue PT for a few more weeks before clearing return to work. Patient wants to change therapy from 2x/wk to 1x/wk.     She was compliant with home exercise program.  Response to previous treatment: eval  Functional change: n/a    Pain: /10  Location: left shoulder     Objective      Objective Measures updated at progress report unless specified.     Treatment     Cristina received the treatments listed below:      therapeutic exercises to develop strength, endurance, ROM, flexibility, posture, and core stabilization for 23 minutes including:    UBE L2.0 10 min   Triceps pull down: 10 x3 GTB  Shoulder flexion stretch: 25secx 3   Shoulder flexion + abduction 2 x10 beth   FOTO    manual therapy techniques: Joint mobilizations, Manual traction, Myofacial release, Manual Lymphatic Drainage, and Soft tissue Mobilization were applied to the: bilateral upper trapezius, GH posterior-anterior mobilization for 0 minutes      neuromuscular re-education  activities to improve: Balance, Coordination, Kinesthetic, Sense, Proprioception, and Posture for 10 minutes. The following activities were included:    shoulder ER + IR: 10 x3   Farmer carry 15# 1 min x3   Biceps curl with overhead push 4# 10 x3   Wall YTI YTB: 6 x 3   Biceps curl + overhead: 10 x3     therapeutic activities to improve functional performance for 10  minutes, including:  Standing pull: 10 x3 GTB  Biceps curl //bar 10 x3   Wall slides: 10 x3       Patient Education and Home Exercises       Education provided:   - self-care    Written Home Exercises Provided: yes. Exercises were reviewed and Cristina was able to demonstrate them prior to the end of the session.  Cristina demonstrated good  understanding of the education provided. See Electronic Medical Record under Patient Instructions for exercises provided during therapy sessions    Assessment     Pt states active shoulder flexion with 1# causes pain, all other exercises are good. Today's session focuses on light stretching and strengthening exercises. Will continue to monitor and progress pt as tolerated.      Cristina Is progressing well towards her goals.   Patient prognosis is Good.     Patient will continue to benefit from skilled outpatient physical therapy to address the deficits listed in the problem list box on initial evaluation, provide pt/family education and to maximize pt's level of independence in the home and community environment.     Patient's spiritual, cultural and educational needs considered and pt agreeable to plan of care and goals.     Anticipated barriers to physical therapy:     Short Term Goals:  2 weeks Status  Date Met   PAIN: Pt will report worst pain of 1/10 in order to progress toward max functional ability and improve quality of life. [x] Progressing  [] Met  [] Not Met     FUNCTION: Patient will demonstrate improved function as indicated by a score of greater than or equal to 55 out of 100 on FOTO. [x] Progressing  []  Met  [] Not Met     MOBILITY: Patient will improve AROM to equal bilaterally in order to progress towards independence with functional activities.  [x] Progressing  [] Met  [] Not Met     STRENGTH: Patient will improve strength to 50% of stated goals, listed in objective measures above, in order to progress towards independence with functional activities. [x] Progressing  [] Met  [] Not Met     POSTURE: Patient will correct postural deviations in sitting and standing, to decrease pain and promote long term stability.  [x] Progressing  [] Met  [] Not Met     HEP: Patient will demonstrate independence with HEP in order to progress toward functional independence. [x] Progressing  [] Met  [] Not Met        Long Term Goals:  6 weeks Status Date Met   PAIN: Pt will report worst pain of 0/10 in order to progress toward max functional ability and improve quality of life [x] Progressing  [] Met  [] Not Met     FUNCTION: Patient will demonstrate improved function as indicated by a score of greater than or equal to 80 out of 100 on FOTO. [x] Progressing  [] Met  [] Not Met     STRENGTH: Patient will improve strength to Manual muscle test 5/5 overall in order to improve functional independence and quality of life.  [x] Progressing  [] Met  [] Not Met     GAIT: Patient will demonstrate normalized gait mechanics with minimal compensation in order to return to PLOF. [x] Progressing  [] Met  [] Not Met     Patient will return to normal ADL's, IADL's, community involvement, recreational activities, and work-related activities with less than or equal to 0/10 pain and maximal function.  [x] Progressing  [] Met  [] Not Met        Plan      Plan of care Certification: 12/7/2023 to 1/26/2024.     Outpatient Physical Therapy 2 times weekly for 6 weeks to include any combination of the following interventions: virtual visits, dry needling, modalities, electrical stimulation (IFC, Pre-Mod, Attended with Functional Dry Needling),  Cervical/Lumbar Traction, Electrical Stimulation IFC, Gait Training, Manual Therapy, Moist Heat/ Ice, Neuromuscular Re-ed, Patient Education, Self Care, Therapeutic Activities, Therapeutic Exercise, and Therapeutic Activites      Faustino Pierson, PT, DPT

## 2024-01-10 ENCOUNTER — CLINICAL SUPPORT (OUTPATIENT)
Dept: REHABILITATION | Facility: HOSPITAL | Age: 29
End: 2024-01-10
Payer: OTHER MISCELLANEOUS

## 2024-01-10 DIAGNOSIS — M25.512 ACUTE PAIN OF LEFT SHOULDER: Primary | ICD-10-CM

## 2024-01-10 PROCEDURE — 97140 MANUAL THERAPY 1/> REGIONS: CPT | Mod: PO

## 2024-01-10 PROCEDURE — 97112 NEUROMUSCULAR REEDUCATION: CPT | Mod: PO

## 2024-01-10 PROCEDURE — 97110 THERAPEUTIC EXERCISES: CPT | Mod: PO

## 2024-01-10 NOTE — PROGRESS NOTES
[facility]   Physical Therapy Treatment Note      Name: Cristina RUFFIN Curiel  Clinic Number: [id number]    Therapy Diagnosis:   Encounter Diagnosis   Name Primary?    Acute pain of left shoulder Yes       Physician: Ramila Washington MD    Visit Date: 1/10/2024    Physician Orders: PT Eval and Treat  Medical Diagnosis from Referral: S46.012S (ICD-10-CM) - Rotator cuff strain, left, sequela  Evaluation Date: 12/7/2023  Authorization Period Expiration: 5/30/2024  Plan of Care Expiration: 1/26/2024, 12 VISITS TOTAL - 6 WEEKS  Progress Note Due: 1/24/2023  Visit # / Visits authorized:  8/12 +1 eval  FOTO: 2/3 (last performed on 1/4/2024) - 61%     Precautions: Standard  Working status: Out of work     Time In: 9:00 AM  Time Out: 10:00 AM   Total Billable Time (timed & untimed codes): 60 minutes    PTA Visit #: 0/5       Subjective     Patient reports: no new symptoms to concern at this moment. She feels her shoulder is improving.     She was compliant with home exercise program.  Response to previous treatment: eval  Functional change: n/a    Pain: /10  Location: left shoulder     Objective      Objective Measures updated at progress report unless specified.     Treatment     Cristina received the treatments listed below:      therapeutic exercises to develop strength, endurance, ROM, flexibility, posture, and core stabilization for 23 minutes including:    UBE L2.0 10 min   Triceps pull down: 10 x3 GTB  Shoulder flexion stretch: 25secx 3   Shoulder adduction stretch: 25 sec x3   Tennis ball self soft tissue mobilization - 5 min     manual therapy techniques: Joint mobilizations, Manual traction, Myofacial release, Manual Lymphatic Drainage, and Soft tissue Mobilization were applied to the: left upper trapezius cupping for 23 minutes  Thoracic + CTJ manipulation     neuromuscular re-education activities to improve: Balance, Coordination, Kinesthetic, Sense, Proprioception, and Posture for 8 minutes. The  following activities were included:    shoulder ER + IR: 10 x3   Farmer carry 15# 1 min x3   Biceps curl with overhead push 4# 10 x3   Wall YTI YTB: 6 x 3     therapeutic activities to improve functional performance for 0  minutes, including:  Standing pull: 10 x3 GTB  Biceps curl //bar 10 x3   Wall slides: 10 x3       Patient Education and Home Exercises       Education provided:   - self-care    Written Home Exercises Provided: yes. Exercises were reviewed and Cristina was able to demonstrate them prior to the end of the session.  Cristina demonstrated good  understanding of the education provided. See Electronic Medical Record under Patient Instructions for exercises provided during therapy sessions    Assessment     Pt states active shoulder flexion with 7# at the end range of motion causes pain. Pt feels well after cupping and spinal manipulation. Subscapularis soft tissue mobilization with soreness and pain. Pt tolerated all exercises well. Will continue to monitor and progress pt as tolerated.      Cristina Is progressing well towards her goals.   Patient prognosis is Good.     Patient will continue to benefit from skilled outpatient physical therapy to address the deficits listed in the problem list box on initial evaluation, provide pt/family education and to maximize pt's level of independence in the home and community environment.     Patient's spiritual, cultural and educational needs considered and pt agreeable to plan of care and goals.     Anticipated barriers to physical therapy:     Short Term Goals:  2 weeks Status  Date Met   PAIN: Pt will report worst pain of 1/10 in order to progress toward max functional ability and improve quality of life. [x] Progressing  [] Met  [] Not Met     FUNCTION: Patient will demonstrate improved function as indicated by a score of greater than or equal to 55 out of 100 on FOTO. [x] Progressing  [] Met  [] Not Met     MOBILITY: Patient will improve AROM to equal bilaterally in  order to progress towards independence with functional activities.  [x] Progressing  [] Met  [] Not Met     STRENGTH: Patient will improve strength to 50% of stated goals, listed in objective measures above, in order to progress towards independence with functional activities. [x] Progressing  [] Met  [] Not Met     POSTURE: Patient will correct postural deviations in sitting and standing, to decrease pain and promote long term stability.  [x] Progressing  [] Met  [] Not Met     HEP: Patient will demonstrate independence with HEP in order to progress toward functional independence. [x] Progressing  [] Met  [] Not Met        Long Term Goals:  6 weeks Status Date Met   PAIN: Pt will report worst pain of 0/10 in order to progress toward max functional ability and improve quality of life [x] Progressing  [] Met  [] Not Met     FUNCTION: Patient will demonstrate improved function as indicated by a score of greater than or equal to 80 out of 100 on FOTO. [x] Progressing  [] Met  [] Not Met     STRENGTH: Patient will improve strength to Manual muscle test 5/5 overall in order to improve functional independence and quality of life.  [x] Progressing  [] Met  [] Not Met     GAIT: Patient will demonstrate normalized gait mechanics with minimal compensation in order to return to PLOF. [x] Progressing  [] Met  [] Not Met     Patient will return to normal ADL's, IADL's, community involvement, recreational activities, and work-related activities with less than or equal to 0/10 pain and maximal function.  [x] Progressing  [] Met  [] Not Met        Plan      Plan of care Certification: 12/7/2023 to 1/26/2024.     Outpatient Physical Therapy 2 times weekly for 6 weeks to include any combination of the following interventions: virtual visits, dry needling, modalities, electrical stimulation (IFC, Pre-Mod, Attended with Functional Dry Needling), Cervical/Lumbar Traction, Electrical Stimulation IFC, Gait Training, Manual Therapy, Moist  Heat/ Ice, Neuromuscular Re-ed, Patient Education, Self Care, Therapeutic Activities, Therapeutic Exercise, and Therapeutic Activites      Faustino Pierson, PT, DPT

## 2024-01-18 ENCOUNTER — CLINICAL SUPPORT (OUTPATIENT)
Dept: REHABILITATION | Facility: HOSPITAL | Age: 29
End: 2024-01-18
Payer: OTHER MISCELLANEOUS

## 2024-01-18 DIAGNOSIS — M25.512 ACUTE PAIN OF LEFT SHOULDER: Primary | ICD-10-CM

## 2024-01-18 PROCEDURE — 97110 THERAPEUTIC EXERCISES: CPT | Mod: PO | Performed by: PHYSICAL THERAPIST

## 2024-01-18 PROCEDURE — 97112 NEUROMUSCULAR REEDUCATION: CPT | Mod: PO | Performed by: PHYSICAL THERAPIST

## 2024-01-18 NOTE — PROGRESS NOTES
[facility]   Physical Therapy Treatment Note      Name: Cristina RUFFIN Curiel  Clinic Number: [id number]    Therapy Diagnosis:   Encounter Diagnosis   Name Primary?    Acute pain of left shoulder Yes       Physician: Ramila Washington MD    Visit Date: 1/18/2024    Physician Orders: PT Eval and Treat  Medical Diagnosis from Referral: S46.012S (ICD-10-CM) - Rotator cuff strain, left, sequela  Evaluation Date: 12/7/2023  Authorization Period Expiration: 5/30/2024  Plan of Care Expiration: 1/26/2024, 12 VISITS TOTAL - 6 WEEKS  Progress Note Due: 1/24/2023  Visit # / Visits authorized:  10/12 - POC next visit.  FOTO: 2/3 (last performed on 1/4/2024) - 61%     Precautions: Standard  Working status: Out of work     Time In: 100 PM  Time Out: 150 PM   Total Billable Time (timed & untimed codes): 48 minutes    PTA Visit #: 0/5       Subjective     Patient reports: having to leave early today. Anterior shoulder pain and levator insertion pain.     She was compliant with home exercise program.  Response to previous treatment: eval  Functional change: n/a    Pain: /10  Location: left shoulder     Objective      Objective Measures updated at progress report unless specified.     Treatment     Cristina received the treatments listed below:      therapeutic exercises to develop strength, endurance, ROM, flexibility, posture, and core stabilization for 10 minutes including:    UBE L2.0 8 min   Triceps pull down: 10 x3 GTB    Shoulder flexion stretch: 25secx 3   Shoulder adduction stretch: 25 sec x3   Tennis ball self soft tissue mobilization - 5 min     manual therapy techniques: Joint mobilizations, Manual traction, Myofacial release, Manual Lymphatic Drainage, and Soft tissue Mobilization were applied to the: left upper trapezius cupping for 0 minutes  Thoracic + CTJ manipulation     neuromuscular re-education activities to improve: Balance, Coordination, Kinesthetic, Sense, Proprioception, and Posture for 38 minutes.  The following activities were included:    Sidelying ER 2# 3x8  Forearm wall slides 30x  Standing arm raise with single arm. RTB 30x  Standing ER with forward punch YTN 30x  Wall clocks YTB 8x each arm  Wall walks YTB 5x  Single arm row GTB 30x    shoulder ER + IR: 10 x3   Farmer carry 15# 1 min x3   Biceps curl with overhead push 4# 10 x3   Wall YTI YTB: 6 x 3     therapeutic activities to improve functional performance for 0  minutes, including:  Standing pull: 10 x3 GTB  Biceps curl //bar 10 x3   Wall slides: 10 x3       Patient Education and Home Exercises       Education provided:   - self-care    Written Home Exercises Provided: yes. Exercises were reviewed and Cristina was able to demonstrate them prior to the end of the session.  Cristina demonstrated good  understanding of the education provided. See Electronic Medical Record under Patient Instructions for exercises provided during therapy sessions    Assessment     Pt presents with discomfort in bicipital groove along with levator insertion. Needs further improvements in strength and decreased pain prior to heavy overhead lifting at work.    Cristina Is progressing well towards her goals.   Patient prognosis is Good.     Patient will continue to benefit from skilled outpatient physical therapy to address the deficits listed in the problem list box on initial evaluation, provide pt/family education and to maximize pt's level of independence in the home and community environment.     Patient's spiritual, cultural and educational needs considered and pt agreeable to plan of care and goals.     Anticipated barriers to physical therapy:     Short Term Goals:  2 weeks Status  Date Met   PAIN: Pt will report worst pain of 1/10 in order to progress toward max functional ability and improve quality of life. [x] Progressing  [] Met  [] Not Met     FUNCTION: Patient will demonstrate improved function as indicated by a score of greater than or equal to 55 out of 100 on FOTO.  [x] Progressing  [] Met  [] Not Met     MOBILITY: Patient will improve AROM to equal bilaterally in order to progress towards independence with functional activities.  [x] Progressing  [] Met  [] Not Met     STRENGTH: Patient will improve strength to 50% of stated goals, listed in objective measures above, in order to progress towards independence with functional activities. [x] Progressing  [] Met  [] Not Met     POSTURE: Patient will correct postural deviations in sitting and standing, to decrease pain and promote long term stability.  [x] Progressing  [] Met  [] Not Met     HEP: Patient will demonstrate independence with HEP in order to progress toward functional independence. [x] Progressing  [] Met  [] Not Met        Long Term Goals:  6 weeks Status Date Met   PAIN: Pt will report worst pain of 0/10 in order to progress toward max functional ability and improve quality of life [x] Progressing  [] Met  [] Not Met     FUNCTION: Patient will demonstrate improved function as indicated by a score of greater than or equal to 80 out of 100 on FOTO. [x] Progressing  [] Met  [] Not Met     STRENGTH: Patient will improve strength to Manual muscle test 5/5 overall in order to improve functional independence and quality of life.  [x] Progressing  [] Met  [] Not Met     GAIT: Patient will demonstrate normalized gait mechanics with minimal compensation in order to return to PLOF. [x] Progressing  [] Met  [] Not Met     Patient will return to normal ADL's, IADL's, community involvement, recreational activities, and work-related activities with less than or equal to 0/10 pain and maximal function.  [x] Progressing  [] Met  [] Not Met        Plan      Plan of care Certification: 12/7/2023 to 1/26/2024.     Outpatient Physical Therapy 2 times weekly for 6 weeks to include any combination of the following interventions: virtual visits, dry needling, modalities, electrical stimulation (IFC, Pre-Mod, Attended with Functional Dry  Needling), Cervical/Lumbar Traction, Electrical Stimulation IFC, Gait Training, Manual Therapy, Moist Heat/ Ice, Neuromuscular Re-ed, Patient Education, Self Care, Therapeutic Activities, Therapeutic Exercise, and Therapeutic Activites      Faustino Pierson, PT, DPT

## 2024-01-30 ENCOUNTER — CLINICAL SUPPORT (OUTPATIENT)
Dept: REHABILITATION | Facility: HOSPITAL | Age: 29
End: 2024-01-30
Payer: OTHER MISCELLANEOUS

## 2024-01-30 DIAGNOSIS — M25.512 ACUTE PAIN OF LEFT SHOULDER: Primary | ICD-10-CM

## 2024-01-30 PROCEDURE — 97112 NEUROMUSCULAR REEDUCATION: CPT | Mod: PO | Performed by: PHYSICAL THERAPIST

## 2024-01-30 PROCEDURE — 97110 THERAPEUTIC EXERCISES: CPT | Mod: PO | Performed by: PHYSICAL THERAPIST

## 2024-01-30 PROCEDURE — 97140 MANUAL THERAPY 1/> REGIONS: CPT | Mod: PO | Performed by: PHYSICAL THERAPIST

## 2024-01-30 NOTE — PROGRESS NOTES
[facility]   Physical Therapy Treatment Note      Name: Cristina RUFFIN Curiel  Clinic Number: [id number]    Therapy Diagnosis:   Encounter Diagnosis   Name Primary?    Acute pain of left shoulder Yes       Physician: Ramila Washington MD    Visit Date: 1/30/2024    Physician Orders: PT Eval and Treat  Medical Diagnosis from Referral: S46.012S (ICD-10-CM) - Rotator cuff strain, left, sequela  Evaluation Date: 12/7/2023  Authorization Period Expiration: 5/30/2024  Plan of Care Expiration: 1/26/2024, 12 VISITS TOTAL - 6 WEEKS - POC next visit (1/31)  Progress Note Due: 1/24/2023  Visit # / Visits authorized:  11/12   FOTO: 2/3 (last performed on 1/4/2024) - 61%     Precautions: Standard  Working status: Out of work     Time In: 100 PM  Time Out: 150 PM   Total Billable Time (timed & untimed codes): 50 minutes    PTA Visit #: 0/5       Subjective     Patient reports: left shoulder pain with overhead lifting    She was compliant with home exercise program.  Response to previous treatment: eval  Functional change: n/a    Pain: /10  Location: left shoulder     Objective      Objective Measures updated at progress report unless specified.     Treatment     Cristina received the treatments listed below:      therapeutic exercises to develop strength, endurance, ROM, flexibility, posture, and core stabilization for 10 minutes including:    UBE L2.0 8 min   FOTO assessment    manual therapy techniques: Joint mobilizations, Manual traction, Myofacial release, Manual Lymphatic Drainage, and Soft tissue Mobilization were applied to the: left upper trapezius cupping for 10 minutes    ASTYM to left shoulder girdle    neuromuscular re-education activities to improve: Balance, Coordination, Kinesthetic, Sense, Proprioception, and Posture for 30 minutes. The following activities were included:    Forearm wall slides 30x  Standing arm raise with single arm. RTB 30x  Standing ER with forward punch YTB 30x  Wall clocks YTB 12x  each arm  Wall walks YTB 12x  double arm row BlueTB 30x    shoulder ER + IR: 10 x3   Farmer carry 15# 1 min x3   Biceps curl with overhead push 4# 10 x3   Wall YTI YTB: 6 x 3     therapeutic activities to improve functional performance for 0  minutes, including:    Standing pull: 10 x3 GTB  Biceps curl //bar 10 x3   Wall slides: 10 x3       Patient Education and Home Exercises       Education provided:   - self-care    Written Home Exercises Provided: yes. Exercises were reviewed and Cristina was able to demonstrate them prior to the end of the session.  Cristina demonstrated good  understanding of the education provided. See Electronic Medical Record under Patient Instructions for exercises provided during therapy sessions    Assessment     Pt presents with left shoulder discomfort with heavy resisted RTC progressions. No numbness or tingling. Needs further strengthening progressions for RTC to improve functional movements.     Cristina Is progressing well towards her goals.   Patient prognosis is Good.     Patient will continue to benefit from skilled outpatient physical therapy to address the deficits listed in the problem list box on initial evaluation, provide pt/family education and to maximize pt's level of independence in the home and community environment.     Patient's spiritual, cultural and educational needs considered and pt agreeable to plan of care and goals.     Anticipated barriers to physical therapy:     Short Term Goals:  2 weeks Status  Date Met   PAIN: Pt will report worst pain of 1/10 in order to progress toward max functional ability and improve quality of life. [x] Progressing  [] Met  [] Not Met     FUNCTION: Patient will demonstrate improved function as indicated by a score of greater than or equal to 55 out of 100 on FOTO. [x] Progressing  [] Met  [] Not Met     MOBILITY: Patient will improve AROM to equal bilaterally in order to progress towards independence with functional activities.  [x]  Progressing  [] Met  [] Not Met     STRENGTH: Patient will improve strength to 50% of stated goals, listed in objective measures above, in order to progress towards independence with functional activities. [x] Progressing  [] Met  [] Not Met     POSTURE: Patient will correct postural deviations in sitting and standing, to decrease pain and promote long term stability.  [x] Progressing  [] Met  [] Not Met     HEP: Patient will demonstrate independence with HEP in order to progress toward functional independence. [x] Progressing  [] Met  [] Not Met        Long Term Goals:  6 weeks Status Date Met   PAIN: Pt will report worst pain of 0/10 in order to progress toward max functional ability and improve quality of life [x] Progressing  [] Met  [] Not Met     FUNCTION: Patient will demonstrate improved function as indicated by a score of greater than or equal to 80 out of 100 on FOTO. [x] Progressing  [] Met  [] Not Met     STRENGTH: Patient will improve strength to Manual muscle test 5/5 overall in order to improve functional independence and quality of life.  [x] Progressing  [] Met  [] Not Met     GAIT: Patient will demonstrate normalized gait mechanics with minimal compensation in order to return to PLOF. [x] Progressing  [] Met  [] Not Met     Patient will return to normal ADL's, IADL's, community involvement, recreational activities, and work-related activities with less than or equal to 0/10 pain and maximal function.  [x] Progressing  [] Met  [] Not Met        Plan      Plan of care Certification: 12/7/2023 to 1/26/2024.     Outpatient Physical Therapy 2 times weekly for 6 weeks to include any combination of the following interventions: virtual visits, dry needling, modalities, electrical stimulation (IFC, Pre-Mod, Attended with Functional Dry Needling), Cervical/Lumbar Traction, Electrical Stimulation IFC, Gait Training, Manual Therapy, Moist Heat/ Ice, Neuromuscular Re-ed, Patient Education, Self Care,  Therapeutic Activities, Therapeutic Exercise, and Therapeutic Activites      Faustino Pierson, PT, DPT

## 2024-01-31 ENCOUNTER — CLINICAL SUPPORT (OUTPATIENT)
Dept: REHABILITATION | Facility: HOSPITAL | Age: 29
End: 2024-01-31
Payer: COMMERCIAL

## 2024-01-31 DIAGNOSIS — M25.512 ACUTE PAIN OF LEFT SHOULDER: Primary | ICD-10-CM

## 2024-01-31 PROCEDURE — 97110 THERAPEUTIC EXERCISES: CPT | Mod: PO

## 2024-01-31 PROCEDURE — 97530 THERAPEUTIC ACTIVITIES: CPT | Mod: PO

## 2024-01-31 PROCEDURE — 97112 NEUROMUSCULAR REEDUCATION: CPT | Mod: PO

## 2024-01-31 NOTE — PLAN OF CARE
[facility]  Physical Therapy Plan of Care Note     Name: Cristina Curiel  Clinic Number: [id number]    Therapy Diagnosis:   Encounter Diagnosis   Name Primary?    Acute pain of left shoulder Yes     Physician: Ramila Washington MD    Visit Date: 1/31/2024    Physician Orders: Eval and Treat   Medical Diagnosis from Referral: S46.012S (ICD-10-CM) - Rotator cuff strain, left, sequela   Evaluation Date: 12/7/2024  Authorization Period Expiration: 5/30/2024  Plan of Care Expiration: 3/15/2024  Progress Note Due: 3/10   Visit # / Visits authorized: 1/ 12  FOTO: 2/3    Precautions: Standard  Working status: Out of work     SUBJECTIVE     Update: her shoulder is doing okay currently , overhead movement causes pain sometimes.     OBJECTIVE     Update: left shoulder   Active shoulder range flexion: 178 deg, abduction 181 deg, ER: 87 deg  MANUAL MUSCLE TEST: biceps: 4+/5, triceps 4+/5, shoulder ER: 4/5, IR: 4+/5   FOTO - 61%    ASSESSMENT     Update:   Pt presents with left shoulder discomfort with resisted external rotation with slow movement overhead. She states she feels no pain but just move slow. She states wall YTI with band helped her strengthening but isometric shoulder ER causes pain. Will continue to strength RTC to improve functional movements.      Previous Short Term Goals Status:     Short Term Goals:  2 weeks Status  Date Met   PAIN: Pt will report worst pain of 1/10 in order to progress toward max functional ability and improve quality of life. [x] Progressing  [] Met  [] Not Met     FUNCTION: Patient will demonstrate improved function as indicated by a score of greater than or equal to 55 out of 100 on FOTO. [x] Progressing  [] Met  [] Not Met     MOBILITY: Patient will improve AROM to equal bilaterally in order to progress towards independence with functional activities.  [x] Progressing  [] Met  [] Not Met     STRENGTH: Patient will improve strength to 50% of stated goals, listed in  objective measures above, in order to progress towards independence with functional activities. [x] Progressing  [] Met  [] Not Met     POSTURE: Patient will correct postural deviations in sitting and standing, to decrease pain and promote long term stability.  [x] Progressing  [] Met  [] Not Met     HEP: Patient will demonstrate independence with HEP in order to progress toward functional independence. [x] Progressing  [] Met  [] Not Met       New Short Term Goals Status:     Short Term Goals:  2 weeks Status  Date Met   PAIN: Pt will report worst pain of 1/10 in order to progress toward max functional ability and improve quality of life. [x] Progressing  [] Met  [] Not Met     FUNCTION: Patient will demonstrate improved function as indicated by a score of greater than or equal to 70 out of 100 on FOTO. [x] Progressing  [] Met  [] Not Met     STRENGTH: Patient will improve strength to 4+/5 overall in order to progress towards independence with functional activities. [x] Progressing  [] Met  [] Not Met       Long Term Goal Status: continue per initial plan of care.  Reasons for Recertification of Therapy:   Patient will continue to benefit from skilled outpatient physical therapy to address the deficits listed in the problem list box on initial evaluation, provide pt/family education and to maximize pt's level of independence in the home and community environment.       PLAN     Updated Certification Period: 1/25/2024 to 3/15/2024   Recommended Treatment Plan: 1 times per week for 6 weeks:  Electrical Stimulation IFC, Manual Therapy, Moist Heat/ Ice, Neuromuscular Re-ed, Patient Education, Self Care, Therapeutic Activities, and Therapeutic Exercise  Other Recommendations: N/A    Faustino Pierson, PT

## 2024-01-31 NOTE — PROGRESS NOTES
[facility]   Physical Therapy Treatment Note      Name: Cristina RUFFIN Curiel  Clinic Number: [id number]    Therapy Diagnosis:   Encounter Diagnosis   Name Primary?    Acute pain of left shoulder Yes       Physician: Ramila Washington MD    Visit Date: 1/31/2024    Physician Orders: PT Eval and Treat  Medical Diagnosis from Referral: S46.012S (ICD-10-CM) - Rotator cuff strain, left, sequela  Evaluation Date: 12/7/2023  Authorization Period Expiration: 5/30/2024  Plan of Care Expiration: 1/26/2024, 12 VISITS TOTAL - 6 WEEKS   Progress Note Due: 1/24/2023  Visit # / Visits authorized:  12/12   FOTO: 2/3 (last performed on 1/4/2024) - 61%     Precautions: Standard  Working status: Out of work     Time In: 1000 PM  Time Out: 1055 PM   Total Billable Time (timed & untimed codes): 55 minutes    PTA Visit #: 0/5       Subjective     Patient reports: her shoulder is doing okay currently     She was compliant with home exercise program.  Response to previous treatment: eval  Functional change: n/a    Pain: /10  Location: left shoulder     Objective      Objective Measures updated at progress report unless specified.     Treatment     Cristina received the treatments listed below:      therapeutic exercises to develop strength, endurance, ROM, flexibility, posture, and core stabilization for 15 minutes including:    UBE L2.0 10 min   Pully 3 min + 3 min abduction/flexion last 20 deg end range of motion    manual therapy techniques: Joint mobilizations, Manual traction, Myofacial release, Manual Lymphatic Drainage, and Soft tissue Mobilization were applied to the: left upper trapezius cupping for 00 minutes    ASTYM to left shoulder girdle    neuromuscular re-education activities to improve: Balance, Coordination, Kinesthetic, Sense, Proprioception, and Posture for 25 minutes. The following activities were included:    Forearm wall slides 30x  Standing arm raise with single arm. RTB 30x  Standing ER with forward  punch YTB 30x  Wall clocks YTB 12x each arm  double arm row BlueTB 30x      therapeutic activities to improve functional performance for 15  minutes, including:    Standing pull: 10 x3 GTB  Biceps curl //bar 10 x3   Wall slides: 10 x3       Patient Education and Home Exercises       Education provided:   - self-care    Written Home Exercises Provided: yes. Exercises were reviewed and Cristina was able to demonstrate them prior to the end of the session.  Cristina demonstrated good  understanding of the education provided. See Electronic Medical Record under Patient Instructions for exercises provided during therapy sessions    Assessment     Pt presents with left shoulder discomfort with resisted external rotation with slow movement overhead. She states she feels no pain but just move slow. She states wall YTI with band helped her strengthening but isometric shoulder ER causes pain. Will continue to strength RTC to improve functional movements.     Cristina Is progressing well towards her goals.   Patient prognosis is Good.     Patient will continue to benefit from skilled outpatient physical therapy to address the deficits listed in the problem list box on initial evaluation, provide pt/family education and to maximize pt's level of independence in the home and community environment.     Patient's spiritual, cultural and educational needs considered and pt agreeable to plan of care and goals.     Anticipated barriers to physical therapy:     Short Term Goals:  2 weeks Status  Date Met   PAIN: Pt will report worst pain of 1/10 in order to progress toward max functional ability and improve quality of life. [x] Progressing  [] Met  [] Not Met     FUNCTION: Patient will demonstrate improved function as indicated by a score of greater than or equal to 55 out of 100 on FOTO. [x] Progressing  [] Met  [] Not Met     MOBILITY: Patient will improve AROM to equal bilaterally in order to progress towards independence with functional  activities.  [x] Progressing  [] Met  [] Not Met     STRENGTH: Patient will improve strength to 50% of stated goals, listed in objective measures above, in order to progress towards independence with functional activities. [x] Progressing  [] Met  [] Not Met     POSTURE: Patient will correct postural deviations in sitting and standing, to decrease pain and promote long term stability.  [x] Progressing  [] Met  [] Not Met     HEP: Patient will demonstrate independence with HEP in order to progress toward functional independence. [x] Progressing  [] Met  [] Not Met        Long Term Goals:  6 weeks Status Date Met   PAIN: Pt will report worst pain of 0/10 in order to progress toward max functional ability and improve quality of life [x] Progressing  [] Met  [] Not Met     FUNCTION: Patient will demonstrate improved function as indicated by a score of greater than or equal to 80 out of 100 on FOTO. [x] Progressing  [] Met  [] Not Met     STRENGTH: Patient will improve strength to Manual muscle test 5/5 overall in order to improve functional independence and quality of life.  [x] Progressing  [] Met  [] Not Met     GAIT: Patient will demonstrate normalized gait mechanics with minimal compensation in order to return to PLOF. [x] Progressing  [] Met  [] Not Met     Patient will return to normal ADL's, IADL's, community involvement, recreational activities, and work-related activities with less than or equal to 0/10 pain and maximal function.  [x] Progressing  [] Met  [] Not Met        Plan      Plan of care Certification: 12/7/2023 to 1/26/2024.     Outpatient Physical Therapy 2 times weekly for 6 weeks to include any combination of the following interventions: virtual visits, dry needling, modalities, electrical stimulation (IFC, Pre-Mod, Attended with Functional Dry Needling), Cervical/Lumbar Traction, Electrical Stimulation IFC, Gait Training, Manual Therapy, Moist Heat/ Ice, Neuromuscular Re-ed, Patient Education, Self  Care, Therapeutic Activities, Therapeutic Exercise, and Therapeutic Activites      Faustino Pierson, PT, DPT     Cholecystitis

## 2024-02-14 ENCOUNTER — OFFICE VISIT (OUTPATIENT)
Dept: ORTHOPEDICS | Facility: CLINIC | Age: 29
End: 2024-02-14
Payer: OTHER MISCELLANEOUS

## 2024-02-14 VITALS
HEART RATE: 91 BPM | WEIGHT: 123.44 LBS | DIASTOLIC BLOOD PRESSURE: 87 MMHG | SYSTOLIC BLOOD PRESSURE: 129 MMHG | BODY MASS INDEX: 21.87 KG/M2

## 2024-02-14 DIAGNOSIS — S46.012S ROTATOR CUFF STRAIN, LEFT, SEQUELA: Primary | ICD-10-CM

## 2024-02-14 DIAGNOSIS — M75.22 BICEPS TENDONITIS ON LEFT: ICD-10-CM

## 2024-02-14 PROCEDURE — 99999 PR PBB SHADOW E&M-EST. PATIENT-LVL III: CPT | Mod: PBBFAC,,, | Performed by: ORTHOPAEDIC SURGERY

## 2024-02-14 PROCEDURE — 99214 OFFICE O/P EST MOD 30 MIN: CPT | Mod: S$GLB,,, | Performed by: ORTHOPAEDIC SURGERY

## 2024-02-14 NOTE — PROGRESS NOTES
[facility], Orthopedics and Sports Medicine  [facility]    Established Patient Shoulder Office Visit  02/14/2024     Diagnosis:  Left rotator cuff strain  Biceps tendonitis     Subjective:      Cristina Curiel is a 28 y.o. female who returns for follow up treatment of the left shoulder problem.    She is still out of work.  Her pain is better but still has feeling of weakness in the shoulder with motions lifting the arm.     The patient had injection last visit and helped a lot.     Outside reports reviewed: historical medical records and office notes.    History reviewed. No pertinent past medical history.    Patient Active Problem List   Diagnosis    Wrist pain, acute, right    Acute pain of left shoulder    Post-traumatic stiffness of left shoulder joint       Past Surgical History:   Procedure Laterality Date    ANTERIOR CRUCIATE LIGAMENT REPAIR      HERNIA REPAIR          No current outpatient medications     Review of patient's allergies indicates:   Allergen Reactions    Pineapple Hives    Zithromax [azithromycin]        Social History     Socioeconomic History    Marital status: Single   Tobacco Use    Smoking status: Some Days     Types: Vaping with nicotine    Smokeless tobacco: Never   Substance and Sexual Activity    Alcohol use: Yes     Alcohol/week: 1.0 standard drink of alcohol     Types: 1 Drinks containing 0.5 oz of alcohol per week     Comment: occas    Drug use: Not Currently     Types: Marijuana     Comment: daily-    Sexual activity: Yes     Partners: Female     Birth control/protection: None, Implant     Comment: Condom sometimes       Family History   Problem Relation Age of Onset    Diabetes Mother     Breast cancer Neg Hx     Ovarian cancer Neg Hx     Colon cancer Neg Hx          Review of Systems   Constitutional: Negative for chills and fever.   HENT:  Negative for hearing loss.    Eyes:  Negative for blurred vision.   Cardiovascular:  Negative for chest pain.    Respiratory:  Negative for shortness of breath.    Gastrointestinal:  Negative for abdominal pain.   Neurological:  Negative for light-headedness.        Objective:      General    Nursing note and vitals reviewed.  Constitutional: She is oriented to person, place, and time. She appears well-developed and well-nourished. No distress.   HENT:   Head: Normocephalic and atraumatic.   Eyes: EOM are normal. Pupils are equal, round, and reactive to light.   Cardiovascular:  Normal rate and regular rhythm.            Pulmonary/Chest: Effort normal and breath sounds normal. No respiratory distress.   Abdominal: Soft.   Neurological: She is alert and oriented to person, place, and time.   Psychiatric: She has a normal mood and affect. Her behavior is normal.         Back (L-Spine & T-Spine) / Neck (C-Spine) Exam     Tenderness   The patient is tender to palpation of the left trapezial.   Right Shoulder Exam   Right shoulder exam is normal.    Inspection/Observation   Swelling: absent  Bruising: absent  Atrophy: absent    Tenderness   The patient is experiencing no tenderness.    Range of Motion   Active abduction:  170 normal   Forward Flexion:  170 normal   External Rotation 0 degrees:  50 normal   Internal rotation 0 degrees:  T8 normal     Tests & Signs   Drop arm: negative  Doss test: negative  Impingement: negative  Belly Press: negative  Active Compression Test (Cherokee's Sign): negative  Speed's Test: negative    Other   Sensation: normal    Comments:  Sukhi test- negative    Left Shoulder Exam     Inspection/Observation   Swelling: absent  Bruising: absent  Atrophy: absent    Tenderness   The patient is tender to palpation of the biceps tendon.    Range of Motion   Active abduction:  170 abnormal   Forward Flexion:  170 abnormal   External Rotation 0 degrees:  50 normal   Internal rotation 0 degrees:  T8 normal     Tests & Signs   Drop arm: positive  Doss test: positive  Impingement: positive  Rotator Cuff  Painful Arc/Range: mild  Belly Press: negative  Active Compression test (Jim Hogg's Sign): negative  Speed's Test: negative    Other   Sensation: normal     Comments:  Sukhi test- negative        Muscle Strength   Right Upper Extremity   Shoulder Abduction: 5/5   Shoulder Internal Rotation: 5/5   Shoulder External Rotation: 5/5   Biceps: 5/5   Left Upper Extremity  Shoulder Abduction: 4/5   Shoulder Internal Rotation: 5/5   Shoulder External Rotation: 4/5   Biceps: 5/5     Reflexes     Left Side  Biceps:  2+  Triceps:  2+  Brachioradialis:  2+  Left Washington's Sign:  Absent    Right Side   Biceps:  2+  Triceps:  2+  Brachioradialis:  2+  Right Washington's Sign:  absent    Vascular Exam     Right Pulses      Radial:                    2+      Left Pulses      Radial:                    2+        Imaging:  None today     Procedures      Assessment:       Cristina Curiel is a 28 y.o. female seen in the office today. The primary encounter diagnosis was Rotator cuff strain, left, sequela. A diagnosis of Biceps tendonitis on left was also pertinent to this visit.  Non-operative treatment is recommended at this time. Overall the patient is improving but still has some subjective weakness.  Will continue therapy at this time to work on strengthening. New order placed.  Continue to be out of work until strength returns.  The natural history and expected course discussed with patient. Various treatment options were discussed, including their risks and benefits. All of the patient's questions were answered.     Plan:      Physical therapy and rehabilitation treatment.  Tylenol 650mg TID, PRN pain.  Follow up in 6 weeks.          Romeo Lomas IV, MD   of Clinical Orthopedics  Department of Orthopedic Surgery  [facility]  Office: [phone]  Website: [email or web address]      Orders Placed This Encounter    Ambulatory referral/consult to Physical/Occupational Therapy

## 2024-02-14 NOTE — LETTER
February 14, 2024    Cristina Curiel  [address] [place] [address]         [facility]  [address]  [address]  [place] [address]  Phone: [phone]  Fax: [phone] February 14, 2024     Patient: Cristina Curiel   YOB: 1995   Date of Visit: 2/14/2024       To Whom It May Concern:    It is my medical opinion that Cristina Curiel should remain out of work until cleared by physician .  Next evaluation will be in approximately 6 weeks where we will determine if she is able to return to work.     If you have any questions or concerns, please don't hesitate to call.    Sincerely,          Romeo Lomas IV, MD

## 2024-02-16 ENCOUNTER — PATIENT MESSAGE (OUTPATIENT)
Dept: ORTHOPEDICS | Facility: CLINIC | Age: 29
End: 2024-02-16
Payer: COMMERCIAL

## 2024-02-22 ENCOUNTER — TELEPHONE (OUTPATIENT)
Dept: FAMILY MEDICINE | Facility: CLINIC | Age: 29
End: 2024-02-22
Payer: COMMERCIAL

## 2024-02-22 DIAGNOSIS — Z13.1 DIABETES MELLITUS SCREENING: ICD-10-CM

## 2024-02-22 DIAGNOSIS — Z13.220 SCREENING CHOLESTEROL LEVEL: ICD-10-CM

## 2024-02-22 DIAGNOSIS — Z00.00 ENCOUNTER FOR BLOOD TEST FOR ROUTINE GENERAL PHYSICAL EXAMINATION: Primary | ICD-10-CM

## 2024-02-22 DIAGNOSIS — Z13.0 SCREENING FOR DEFICIENCY ANEMIA: ICD-10-CM

## 2024-02-22 DIAGNOSIS — Z13.29 THYROID DISORDER SCREEN: ICD-10-CM

## 2024-02-22 NOTE — TELEPHONE ENCOUNTER
Yes - please contact patient and ask her to please have labs drawn Friday or Saturday so we have results at time of visit to discuss

## 2024-02-26 ENCOUNTER — OFFICE VISIT (OUTPATIENT)
Dept: FAMILY MEDICINE | Facility: CLINIC | Age: 29
End: 2024-02-26
Payer: COMMERCIAL

## 2024-02-26 VITALS
TEMPERATURE: 98 F | OXYGEN SATURATION: 99 % | DIASTOLIC BLOOD PRESSURE: 70 MMHG | HEART RATE: 73 BPM | WEIGHT: 124.88 LBS | BODY MASS INDEX: 22.12 KG/M2 | SYSTOLIC BLOOD PRESSURE: 100 MMHG | HEIGHT: 63 IN

## 2024-02-26 DIAGNOSIS — Z00.00 ANNUAL PHYSICAL EXAM: Primary | ICD-10-CM

## 2024-02-26 DIAGNOSIS — F41.9 ANXIETY: ICD-10-CM

## 2024-02-26 PROCEDURE — 1160F RVW MEDS BY RX/DR IN RCRD: CPT | Mod: CPTII,S$GLB,, | Performed by: NURSE PRACTITIONER

## 2024-02-26 PROCEDURE — 3078F DIAST BP <80 MM HG: CPT | Mod: CPTII,S$GLB,, | Performed by: NURSE PRACTITIONER

## 2024-02-26 PROCEDURE — 99214 OFFICE O/P EST MOD 30 MIN: CPT | Mod: 25,S$GLB,, | Performed by: NURSE PRACTITIONER

## 2024-02-26 PROCEDURE — 99999 PR PBB SHADOW E&M-EST. PATIENT-LVL III: CPT | Mod: PBBFAC,,, | Performed by: NURSE PRACTITIONER

## 2024-02-26 PROCEDURE — 3044F HG A1C LEVEL LT 7.0%: CPT | Mod: CPTII,S$GLB,, | Performed by: NURSE PRACTITIONER

## 2024-02-26 PROCEDURE — 3074F SYST BP LT 130 MM HG: CPT | Mod: CPTII,S$GLB,, | Performed by: NURSE PRACTITIONER

## 2024-02-26 PROCEDURE — 3008F BODY MASS INDEX DOCD: CPT | Mod: CPTII,S$GLB,, | Performed by: NURSE PRACTITIONER

## 2024-02-26 PROCEDURE — 1159F MED LIST DOCD IN RCRD: CPT | Mod: CPTII,S$GLB,, | Performed by: NURSE PRACTITIONER

## 2024-02-26 PROCEDURE — 99395 PREV VISIT EST AGE 18-39: CPT | Mod: S$GLB,,, | Performed by: NURSE PRACTITIONER

## 2024-02-26 RX ORDER — BUSPIRONE HYDROCHLORIDE 7.5 MG/1
7.5 TABLET ORAL 2 TIMES DAILY PRN
Qty: 60 TABLET | Refills: 1 | Status: SHIPPED | OUTPATIENT
Start: 2024-02-26 | End: 2025-02-25

## 2024-02-26 NOTE — PROGRESS NOTES
Subjective:       Patient ID: Cristina Curiel is a 28 y.o. female.    Chief Complaint: Annual Exam and Anxiety    HPI    Patient is a 28 year old black female with no significant PMH not on any daily medications that is here today for ANNUAL physical exam with fasting lab results.  Patient also has complaint of Anxiety that she would like addressed today.  Will address additional complaint in same progress note as wellness exam.    Anxiety  Reports she has been out of work since November 2023 for rotator cuff injury  She should return to work at end of March  She reports she has increased anxiety, mostly at night - states she has palpitation, overthinking, anxious about being out of work.  She also report social anxiety around large crowds such as during [place].  She states anxiety episodes around once a week.  Will trial Buspar 7.5 mg twice daily PRN anxiety  Follow up in 6 weeks.    Wellness Labs:  CBC okay  CMP okay  Cholesterol levels are excellent  TSH WNL  HgbA1C 5.5%    Health Maintenance:  Declined covid and flu vaccines  Pap Smear up to date    Lab Visit on 02/24/2024   Component Date Value Ref Range Status    WBC 02/24/2024 4.71  3.90 - 12.70 K/uL Final    RBC 02/24/2024 4.54  4.00 - 5.40 M/uL Final    Hemoglobin 02/24/2024 12.6  12.0 - 16.0 g/dL Final    Hematocrit 02/24/2024 38.9  37.0 - 48.5 % Final    MCV 02/24/2024 86  82 - 98 fL Final    MCH 02/24/2024 27.8  27.0 - 31.0 pg Final    MCHC 02/24/2024 32.4  32.0 - 36.0 g/dL Final    RDW 02/24/2024 12.8  11.5 - 14.5 % Final    Platelets 02/24/2024 257  150 - 450 K/uL Final    MPV 02/24/2024 10.7  9.2 - 12.9 fL Final    Immature Granulocytes 02/24/2024 0.2  0.0 - 0.5 % Final    Gran # (ANC) 02/24/2024 1.5 (L)  1.8 - 7.7 K/uL Final    Immature Grans (Abs) 02/24/2024 0.01  0.00 - 0.04 K/uL Final    Comment: Mild elevation in immature granulocytes is non specific and   can be seen in a variety of conditions including stress response,   acute inflammation,  trauma and pregnancy. Correlation with other   laboratory and clinical findings is essential.      Lymph # 02/24/2024 2.5  1.0 - 4.8 K/uL Final    Mono # 02/24/2024 0.5  0.3 - 1.0 K/uL Final    Eos # 02/24/2024 0.1  0.0 - 0.5 K/uL Final    Baso # 02/24/2024 0.04  0.00 - 0.20 K/uL Final    nRBC 02/24/2024 0  0 /100 WBC Final    Gran % 02/24/2024 32.8 (L)  38.0 - 73.0 % Final    Lymph % 02/24/2024 53.3 (H)  18.0 - 48.0 % Final    Mono % 02/24/2024 10.8  4.0 - 15.0 % Final    Eosinophil % 02/24/2024 2.1  0.0 - 8.0 % Final    Basophil % 02/24/2024 0.8  0.0 - 1.9 % Final    Differential Method 02/24/2024 Automated   Final    Sodium 02/24/2024 145  136 - 145 mmol/L Final    Potassium 02/24/2024 4.3  3.5 - 5.1 mmol/L Final    Chloride 02/24/2024 106  95 - 110 mmol/L Final    CO2 02/24/2024 28  23 - 29 mmol/L Final    Glucose 02/24/2024 98  70 - 110 mg/dL Final    BUN 02/24/2024 8  7 - 17 mg/dL Final    Creatinine 02/24/2024 0.79  0.50 - 1.40 mg/dL Final    Calcium 02/24/2024 9.4  8.7 - 10.5 mg/dL Final    Total Protein 02/24/2024 8.2  6.0 - 8.4 g/dL Final    Albumin 02/24/2024 4.5  3.5 - 5.2 g/dL Final    Total Bilirubin 02/24/2024 0.7  0.1 - 1.0 mg/dL Final    Comment: For infants and newborns, interpretation of results should be based  on gestational age, weight and in agreement with clinical  observations.    Premature Infant recommended reference ranges:  Up to 24 hours.............<8.0 mg/dL  Up to 48 hours............<12.0 mg/dL  3-5 days..................<15.0 mg/dL  6-29 days.................<15.0 mg/dL      Alkaline Phosphatase 02/24/2024 61  38 - 126 U/L Final    AST 02/24/2024 23  15 - 46 U/L Final    ALT 02/24/2024 15  10 - 44 U/L Final    Anion Gap 02/24/2024 11  8 - 16 mmol/L Final    eGFR 02/24/2024 >60.0  >60 mL/min/1.73 m^2 Final    Hemoglobin A1C 02/24/2024 5.5  4.0 - 5.6 % Final    Comment: ADA Screening Guidelines:  5.7-6.4%  Consistent with prediabetes  >or=6.5%  Consistent with diabetes    High levels  of fetal hemoglobin interfere with the HbA1C  assay. Heterozygous hemoglobin variants (HbS, HgC, etc)do  not significantly interfere with this assay.   However, presence of multiple variants may affect accuracy.      Estimated Avg Glucose 02/24/2024 111  68 - 131 mg/dL Final    Cholesterol 02/24/2024 133  120 - 199 mg/dL Final    Comment: The National Cholesterol Education Program (NCEP) has set the  following guidelines (reference ranges) for Cholesterol:  Optimal.....................<200 mg/dL  Borderline High.............200-239 mg/dL  High........................> or = 240 mg/dL      Triglycerides 02/24/2024 54  30 - 150 mg/dL Final    Comment: The National Cholesterol Education Program (NCEP) has set the  following guidelines (reference values) for triglycerides:  Normal......................<150 mg/dL  Borderline High.............150-199 mg/dL  High........................200-499 mg/dL      HDL 02/24/2024 54  40 - 75 mg/dL Final    Comment: The National Cholesterol Education Program (NCEP) has set the  following guidelines (reference values) for HDL Cholesterol:  Low...............<40 mg/dL  Optimal...........>60 mg/dL      LDL Cholesterol 02/24/2024 68.2  63.0 - 159.0 mg/dL Final    Comment: The National Cholesterol Education Program (NCEP) has set the  following guidelines (reference values) for LDL Cholesterol:  Optimal.......................<130 mg/dL  Borderline High...............130-159 mg/dL  High..........................160-189 mg/dL  Very High.....................>190 mg/dL      HDL/Cholesterol Ratio 02/24/2024 40.6  20.0 - 50.0 % Final    Total Cholesterol/HDL Ratio 02/24/2024 2.5  2.0 - 5.0 Final    Non-HDL Cholesterol 02/24/2024 79  mg/dL Final    Comment: Risk category and Non-HDL cholesterol goals:  Coronary heart disease (CHD)or equivalent (10-year risk of CHD >20%):  Non-HDL cholesterol goal     <130 mg/dL  Two or more CHD risk factors and 10-year risk of CHD <= 20%:  Non-HDL cholesterol goal  "    <160 mg/dL  0 to 1 CHD risk factor:  Non-HDL cholesterol goal     <190 mg/dL      TSH 02/24/2024 1.400  0.400 - 4.000 uIU/mL Final    Comment: Warning:  Heterophilic antibodies in serum or plasma of   certain individuals are known to cause interference with   immunoassays. These antibodies may be present in blood samples   from individuals regularly exposed to animal or who have been   treated with animal products.     Patients taking high doses of supplemental biotin may have  negatively biased results.          Review of Systems   HENT: Negative.     Respiratory: Negative.     Cardiovascular: Negative.    Gastrointestinal: Negative.    Psychiatric/Behavioral:  Positive for sleep disturbance. Negative for decreased concentration, dysphoric mood and self-injury. The patient is nervous/anxious.          Objective:     Vitals:    02/26/24 0805   BP: 100/70   BP Location: Right arm   Patient Position: Sitting   BP Method: Large (Manual)   Pulse: 73   Temp: 97.9 °F (36.6 °C)   TempSrc: Temporal   SpO2: 99%   Weight: 56.7 kg (124 lb 14.3 oz)   Height: 5' 3" (1.6 m)          Physical Exam  Constitutional:       General: She is not in acute distress.     Appearance: Normal appearance. She is normal weight. She is not toxic-appearing or diaphoretic.      Comments: Body mass index is 22.12 kg/m².     HENT:      Head: Normocephalic and atraumatic.      Right Ear: Tympanic membrane, ear canal and external ear normal.      Left Ear: Tympanic membrane, ear canal and external ear normal.      Nose: No congestion.      Mouth/Throat:      Pharynx: No posterior oropharyngeal erythema.   Eyes:      Extraocular Movements: Extraocular movements intact.      Conjunctiva/sclera: Conjunctivae normal.   Cardiovascular:      Rate and Rhythm: Normal rate and regular rhythm.      Heart sounds: Normal heart sounds. No murmur heard.  Pulmonary:      Effort: Pulmonary effort is normal. No respiratory distress.      Breath sounds: Normal " breath sounds. No stridor. No wheezing, rhonchi or rales.   Abdominal:      General: There is no distension.   Musculoskeletal:         General: Normal range of motion.      Cervical back: Normal range of motion.   Skin:     General: Skin is warm and dry.   Neurological:      Mental Status: She is alert and oriented to person, place, and time.   Psychiatric:         Mood and Affect: Mood normal.         Behavior: Behavior normal.           Assessment:         ICD-10-CM ICD-9-CM   1. Annual physical exam  Z00.00 V70.0   2. Anxiety  F41.9 300.00       Plan:       1. Annual physical exam   Health Maintenance Summary   Full History      Expand All  Collapse All  Postponed - COVID-19 Vaccine   (3 - 2023-24 season)Postponed until 2/26/2025 08/25/2021  Imm Admin: COVID-19, MRNA, LN-S, PF (Pfizer) (Purple Cap)   08/04/2021  Imm Admin: COVID-19, MRNA, LN-S, PF (Pfizer) (Purple Cap)   Postponed - Pneumococcal Vaccines (Age 0-64)   (1 of 2 - PCV)Postponed until 3/15/2034  12/19/2001  Imm Admin: Pneumococcal Conjugate - 7 Valent   TETANUS VACCINE   (Every 10 Years)Next due on 12/21/2025 12/21/2015  Imm Admin: Tdap   04/15/2009  Imm Admin: Tdap   Pap Smear   (Every 3 Years)Next due on 9/20/2026 09/20/2023  Liquid-Based Pap Smear, Screening   06/04/2021  Liquid-Based Pap Smear, Screening   09/17/2019  Liquid-based pap smear, screening   HPV Vaccines   (Series Information)Completed  10/09/2009  Imm Admin: HPV Quadrivalent   06/16/2009  Imm Admin: HPV Quadrivalent   04/15/2009  Imm Admin: HPV Quadrivalent   Hepatitis C Screening  Completed  04/07/2022  Hepatitis C Ab component of Hepatitis Panel, Acute   09/17/2019  Hepatitis C Ab component of Hepatitis panel, acute   HIV Screening  Completed  04/07/2022  HIV 1/2 Ag/Ab (4th Gen)   09/17/2019  HIV 1/2 Ag/Ab (4th Gen)   08/10/2018  HIV-1 and HIV-2 antibodies   Lipid Panel  Completed  02/24/2024  Cholesterol Total component of Lipid Panel   03/08/2018  Cholesterol Total component  of Lipid panel   Influenza Vaccine   (Series Information)Addressed  02/26/2024  Declined   12/21/2015  Imm Admin: Influenza (Flumist) - Quadrivalent - Intranasal *Preferred* (2-49 years old)   11/05/2014  Imm Admin: Influenza (Flumist) - Quadrivalent - Intranasal *Preferred* (2-49 years old)   09/30/2013  Imm Admin: Influenza (Flumist) - Quadrivalent - Intranasal *Preferred* (2-49 years old)   11/21/2012  Imm Admin: Influenza - Intranasal   View More History          2. Anxiety  Overview:  Reports she has been out of work since November 2023 for rotator cuff injury  She should return to work at end of March  She reports she has increased anxiety, mostly at night - states she has palpitation, overthinking, anxious about being out of work.  She also report social anxiety around large crowds such as during [place].  She states anxiety episodes around once a week.  Will trial Buspar 7.5 mg twice daily PRN anxiety  Follow up in 6 weeks.    Orders:  -     busPIRone (BUSPAR) 7.5 MG tablet; Take 1 tablet (7.5 mg total) by mouth 2 (two) times daily as needed (anxiety).  Dispense: 60 tablet; Refill: 1       Follow up in about 6 weeks (around 4/8/2024) for medication follow up.     Patient's Medications   New Prescriptions    BUSPIRONE (BUSPAR) 7.5 MG TABLET    Take 1 tablet (7.5 mg total) by mouth 2 (two) times daily as needed (anxiety).   Previous Medications    No medications on file   Modified Medications    No medications on file   Discontinued Medications    No medications on file       History reviewed. No pertinent past medical history.    Past Surgical History:   Procedure Laterality Date    ANTERIOR CRUCIATE LIGAMENT REPAIR      HERNIA REPAIR         Family History   Problem Relation Age of Onset    Diabetes Mother     Breast cancer Neg Hx     Ovarian cancer Neg Hx     Colon cancer Neg Hx        Social History     Socioeconomic History    Marital status: Single   Tobacco Use    Smoking status: Former     Types:  Vaping with nicotine     Start date: 2020     Quit date: 2023     Years since quittin.2    Smokeless tobacco: Never   Substance and Sexual Activity    Alcohol use: Yes     Alcohol/week: 1.0 standard drink of alcohol     Types: 1 Drinks containing 0.5 oz of alcohol per week     Comment: occas    Drug use: Not Currently     Types: Marijuana     Comment: daily-    Sexual activity: Yes     Partners: Female     Birth control/protection: None, Implant     Comment: Condom sometimes

## 2024-03-04 ENCOUNTER — CLINICAL SUPPORT (OUTPATIENT)
Dept: REHABILITATION | Facility: HOSPITAL | Age: 29
End: 2024-03-04
Attending: ORTHOPAEDIC SURGERY
Payer: OTHER MISCELLANEOUS

## 2024-03-04 DIAGNOSIS — M25.512 ACUTE PAIN OF LEFT SHOULDER: Primary | ICD-10-CM

## 2024-03-04 PROCEDURE — 97110 THERAPEUTIC EXERCISES: CPT | Mod: PO

## 2024-03-04 PROCEDURE — 97530 THERAPEUTIC ACTIVITIES: CPT | Mod: PO

## 2024-03-04 PROCEDURE — 97140 MANUAL THERAPY 1/> REGIONS: CPT | Mod: PO

## 2024-03-04 PROCEDURE — 97112 NEUROMUSCULAR REEDUCATION: CPT | Mod: PO

## 2024-03-04 NOTE — PLAN OF CARE
[facility]  Physical Therapy Plan of Care Note     Name: Cristina Curiel  Clinic Number: [id number]    Therapy Diagnosis:   Encounter Diagnosis   Name Primary?    Acute pain of left shoulder Yes     Physician: Romeo Lomas IV, MD    Visit Date: 3/4/2024    Physician Orders: Eval and Treat   Medical Diagnosis from Referral:   S46.012S (ICD-10-CM) - Rotator cuff strain, left, sequela   M75.22 (ICD-10-CM) - Biceps tendonitis on left     Evaluation Date: 12/7/2023  Authorization Period Expiration: 8/16/2024   Plan of Care Expiration: 4/12/2024  Progress Note Due: 4/5   Visit # / Visits authorized: 1/ 12  FOTO: 4/4    Precautions: Standard  Functional Level Prior to Evaluation:  independent ambulation     SUBJECTIVE     Update: she haven't return to work yet. She feels better since last visit. She tried climbing rocks last week but feel soreness afterward. She states last weekend she feels pain when she was lying on her injured side for sleeping     OBJECTIVE     Update:   Shoulder flexion: 177 deg   Shoulder ER 89 deg - pain at the end   Shoulder  deg   Shoulder abduction 170 deg   FOTO 65% - 14% higher than initial visit     ASSESSMENT     Update: Pt presents with left shoulder discomfort with prone rowing and YTI at the end range of motion. Point tenderness at medial L scapular and rotator cuff tendon. She responds well and feels tension relief after manual therapy. Pt present with improved active shoulder range of motion in all directions. Will continue to strength RTC to improve functional movements.      Previous Short Term Goals Status:          Short Term Goals:  2 weeks Status  Date Met   PAIN: Pt will report worst pain of 1/10 in order to progress toward max functional ability and improve quality of life. [x] Progressing  [] Met  [] Not Met     FUNCTION: Patient will demonstrate improved function as indicated by a score of greater than or equal to 55 out of 100 on FOTO. [x]  Progressing  [] Met  [] Not Met     MOBILITY: Patient will improve AROM to equal bilaterally in order to progress towards independence with functional activities.  [x] Progressing  [] Met  [] Not Met     STRENGTH: Patient will improve strength to 50% of stated goals, listed in objective measures above, in order to progress towards independence with functional activities. [x] Progressing  [] Met  [] Not Met     POSTURE: Patient will correct postural deviations in sitting and standing, to decrease pain and promote long term stability.  [x] Progressing  [] Met  [] Not Met     HEP: Patient will demonstrate independence with HEP in order to progress toward functional independence. [x] Progressing  [] Met  [] Not Met        New Short Term Goals Status:     Short Term Goals:  2 weeks Status  Date Met   PAIN: Pt will report worst pain of 1/10 in order to progress toward max functional ability and improve quality of life. [x] Progressing  [] Met  [] Not Met     POSTURE: Patient will correct postural deviations in sitting and standing, to decrease pain and promote long term stability.  [x] Progressing  [] Met  [] Not Met       Long Term Goal Status: continue per initial plan of care.  Reasons for Recertification of Therapy:    Patient will continue to benefit from skilled outpatient physical therapy to address the deficits listed in the problem list box on initial evaluation, provide pt/family education and to maximize pt's level of independence in the home and community environment.       PLAN     Updated Certification Period: 3/4/2024 to 4/12/2024   Recommended Treatment Plan: 2 times per week for 6 weeks:  Electrical Stimulation IFC, Manual Therapy, Moist Heat/ Ice, Neuromuscular Re-ed, Patient Education, Self Care, Therapeutic Activities, Therapeutic Exercise, and Ultrasound  Other Recommendations: n/a    Faustino Pierson, PT,DPT

## 2024-03-04 NOTE — PROGRESS NOTES
[facility]   Physical Therapy Treatment Note      Name: Cristina Curiel  [facility] Number: [id number]    Therapy Diagnosis:   Encounter Diagnosis   Name Primary?    Acute pain of left shoulder Yes       Physician: Romeo Lomas IV, MD    Visit Date: 3/4/2024    Physician Orders: PT Eval and Treat  Medical Diagnosis from Referral: S46.012S (ICD-10-CM) - Rotator cuff strain, left, sequela  Evaluation Date: 12/7/2023  Authorization Period Expiration: 5/30/2024    Plan of Care Expiration: 1/26/2024, 12 VISITS TOTAL - 6 WEEKS   Progress Note Due: 1/24/2023  Visit # / Visits authorized:  12/12   FOTO: 2/3 (last performed on 1/4/2024) - 61%     Precautions: Standard  Working status: Out of work     Time In: 10:00 PM  Time Out: 10:50 PM   Total Billable Time (timed & untimed codes): 50 minutes    PTA Visit #: 0/5       Subjective     Patient reports: she haven't return to work yet. She feels better since last visit. She tried climbing rocks last week but feel soreness afterward. She states last weekend she feels pain when she was lying on her injured side for sleeping    She was compliant with home exercise program.  Response to previous treatment: eval  Functional change: n/a    Pain: /10  Location: left shoulder     Objective      Shoulder flexion: 177 deg   Shoulder ER 89 deg - pain at the end   Shoulder  deg   Shoulder abduction 170 deg   FOTO 65% - 14% higher than initial visit     Treatment     Cristina received the treatments listed below:      therapeutic exercises to develop strength, endurance, ROM, flexibility, posture, and core stabilization for 15 minutes including:    UBE L2.0 10 min + heat   Foto     manual therapy techniques: Joint mobilizations, Manual traction, Myofacial release, Manual Lymphatic Drainage, and Soft tissue Mobilization were applied to the: left upper trapezius cupping for 10 minutes  CTJ + Thoracic manipulation       neuromuscular re-education activities to improve:  Balance, Coordination, Kinesthetic, Sense, Proprioception, and Posture for 8 minutes. The following activities were included:    Pec stretchin sec x3   Shoulder horizontal abd stretchin sec x3       therapeutic activities to improve functional performance for 15  minutes, including:    Standing pull: 10 x3 GTB  Prone rowing: 10 x3 4 #  Prone YTI : 10 x2  Biceps curl //bar 10 x3   Wall slides: 10 x3       Patient Education and Home Exercises       Education provided:   - self-care    Written Home Exercises Provided: yes. Exercises were reviewed and Cristina was able to demonstrate them prior to the end of the session.  Cristina demonstrated good  understanding of the education provided. See Electronic Medical Record under Patient Instructions for exercises provided during therapy sessions    Assessment     Pt presents with left shoulder discomfort with prone rowing and YTI at the end range of motion. Point tenderness at medial L scapular and rotator cuff tendon. She responds well and feels tension relief after manual therapy. Pt present with improved active shoulder range of motion in all directions. Will continue to strength RTC to improve functional movements.     Cristina Is progressing well towards her goals.   Patient prognosis is Good.     Patient will continue to benefit from skilled outpatient physical therapy to address the deficits listed in the problem list box on initial evaluation, provide pt/family education and to maximize pt's level of independence in the home and community environment.     Patient's spiritual, cultural and educational needs considered and pt agreeable to plan of care and goals.     Anticipated barriers to physical therapy:     Short Term Goals:  2 weeks Status  Date Met   PAIN: Pt will report worst pain of 1/10 in order to progress toward max functional ability and improve quality of life. [x] Progressing  [] Met  [] Not Met     FUNCTION: Patient will demonstrate improved function  as indicated by a score of greater than or equal to 55 out of 100 on FOTO. [x] Progressing  [] Met  [] Not Met     MOBILITY: Patient will improve AROM to equal bilaterally in order to progress towards independence with functional activities.  [x] Progressing  [] Met  [] Not Met     STRENGTH: Patient will improve strength to 50% of stated goals, listed in objective measures above, in order to progress towards independence with functional activities. [x] Progressing  [] Met  [] Not Met     POSTURE: Patient will correct postural deviations in sitting and standing, to decrease pain and promote long term stability.  [x] Progressing  [] Met  [] Not Met     HEP: Patient will demonstrate independence with HEP in order to progress toward functional independence. [x] Progressing  [] Met  [] Not Met        Long Term Goals:  6 weeks Status Date Met   PAIN: Pt will report worst pain of 0/10 in order to progress toward max functional ability and improve quality of life [x] Progressing  [] Met  [] Not Met     FUNCTION: Patient will demonstrate improved function as indicated by a score of greater than or equal to 80 out of 100 on FOTO. [x] Progressing  [] Met  [] Not Met     STRENGTH: Patient will improve strength to Manual muscle test 5/5 overall in order to improve functional independence and quality of life.  [x] Progressing  [] Met  [] Not Met     GAIT: Patient will demonstrate normalized gait mechanics with minimal compensation in order to return to PLOF. [x] Progressing  [] Met  [] Not Met     Patient will return to normal ADL's, IADL's, community involvement, recreational activities, and work-related activities with less than or equal to 0/10 pain and maximal function.  [x] Progressing  [] Met  [] Not Met        Plan      Plan of care Certification: 12/7/2023 to 1/26/2024.     Outpatient Physical Therapy 2 times weekly for 6 weeks to include any combination of the following interventions: virtual visits, dry needling,  modalities, electrical stimulation (IFC, Pre-Mod, Attended with Functional Dry Needling), Cervical/Lumbar Traction, Electrical Stimulation IFC, Gait Training, Manual Therapy, Moist Heat/ Ice, Neuromuscular Re-ed, Patient Education, Self Care, Therapeutic Activities, Therapeutic Exercise, and Therapeutic Activites      Faustino Pierson, PT, DPT

## 2024-03-11 ENCOUNTER — CLINICAL SUPPORT (OUTPATIENT)
Dept: REHABILITATION | Facility: HOSPITAL | Age: 29
End: 2024-03-11
Attending: ORTHOPAEDIC SURGERY
Payer: OTHER MISCELLANEOUS

## 2024-03-11 DIAGNOSIS — M25.512 ACUTE PAIN OF LEFT SHOULDER: Primary | ICD-10-CM

## 2024-03-11 DIAGNOSIS — S46.012S ROTATOR CUFF STRAIN, LEFT, SEQUELA: ICD-10-CM

## 2024-03-11 DIAGNOSIS — M75.22 BICEPS TENDONITIS ON LEFT: ICD-10-CM

## 2024-03-11 PROCEDURE — 97110 THERAPEUTIC EXERCISES: CPT | Mod: PO

## 2024-03-11 PROCEDURE — 97112 NEUROMUSCULAR REEDUCATION: CPT | Mod: PO

## 2024-03-11 PROCEDURE — 97530 THERAPEUTIC ACTIVITIES: CPT | Mod: PO

## 2024-03-11 PROCEDURE — 97140 MANUAL THERAPY 1/> REGIONS: CPT | Mod: PO

## 2024-03-11 NOTE — PROGRESS NOTES
[facility]   Physical Therapy Treatment Note      Name: Cristina RUFFIN Curiel  Clinic Number: [id number]    Therapy Diagnosis:   Encounter Diagnoses   Name Primary?    Rotator cuff strain, left, sequela     Biceps tendonitis on left     Acute pain of left shoulder Yes       Physician: Romeo Lomas IV, MD    Visit Date: 3/11/2024    Physician Orders: PT Eval and Treat  Medical Diagnosis from Referral: S46.012S (ICD-10-CM) - Rotator cuff strain, left, sequela  Evaluation Date: 2023  Authorization Period Expiration: 2024    Plan of Care Expiration: 2024, 12 VISITS TOTAL - 6 WEEKS   Progress Note Due: 2023  Visit # / Visits authorized:     FOTO: 2/3 (last performed on 2024) - 61%     Precautions: Standard  Working status: Out of work     Time In: 11:00 PM  Time Out: 11:55 PM   Total Billable Time (timed & untimed codes): 55 minutes    PTA Visit #: 0       Subjective     Patient reports: she feels good since last visit.     She was compliant with home exercise program.  Response to previous treatment: eval  Functional change: n/a    Pain: /10  Location: left shoulder     Objective        Treatment     Cristina received the treatments listed below:      therapeutic exercises to develop strength, endurance, ROM, flexibility, posture, and core stabilization for 10 minutes including:    UBE L2.0 10 min + heat       manual therapy techniques: Joint mobilizations, Manual traction, Myofacial release, Manual Lymphatic Drainage, and Soft tissue Mobilization were applied to the: left upper trapezius cupping for 23 minutes  Cupping with manual therapy on scapular and RC      neuromuscular re-education activities to improve: Balance, Coordination, Kinesthetic, Sense, Proprioception, and Posture for 8 minutes. The following activities were included:    Pec stretchin sec x3   Shoulder horizontal abd stretchin sec x3   Serratus anterior: GTB 10 x3       therapeutic activities to  improve functional performance for 10  minutes, including:    Standing pull: 10 x3 GTB  Prone rowing: 10 x3 4 #  Prone YTI : 10 x2  Biceps curl //bar 10 x3   Wall slides: 10 x3       Patient Education and Home Exercises       Education provided:   - self-care    Written Home Exercises Provided: yes. Exercises were reviewed and Cristina was able to demonstrate them prior to the end of the session.  Cristina demonstrated good  understanding of the education provided. See Electronic Medical Record under Patient Instructions for exercises provided during therapy sessions    Assessment     Moderate darkness and bruise on R rotator cuff. She states tension and pain relief after cupping. She performed all exercises well with verbal cuing for upper trapezius relaxation. Scaption still causing mild pain. Will continue to monitor and progress as she can tolerate it.     Cristina Is progressing well towards her goals.   Patient prognosis is Good.     Patient will continue to benefit from skilled outpatient physical therapy to address the deficits listed in the problem list box on initial evaluation, provide pt/family education and to maximize pt's level of independence in the home and community environment.     Patient's spiritual, cultural and educational needs considered and pt agreeable to plan of care and goals.     Anticipated barriers to physical therapy:     Short Term Goals:  2 weeks Status  Date Met   PAIN: Pt will report worst pain of 1/10 in order to progress toward max functional ability and improve quality of life. [x] Progressing  [] Met  [] Not Met     FUNCTION: Patient will demonstrate improved function as indicated by a score of greater than or equal to 55 out of 100 on FOTO. [x] Progressing  [] Met  [] Not Met     MOBILITY: Patient will improve AROM to equal bilaterally in order to progress towards independence with functional activities.  [x] Progressing  [] Met  [] Not Met     STRENGTH: Patient will improve strength  to 50% of stated goals, listed in objective measures above, in order to progress towards independence with functional activities. [x] Progressing  [] Met  [] Not Met     POSTURE: Patient will correct postural deviations in sitting and standing, to decrease pain and promote long term stability.  [x] Progressing  [] Met  [] Not Met     HEP: Patient will demonstrate independence with HEP in order to progress toward functional independence. [x] Progressing  [] Met  [] Not Met        Long Term Goals:  6 weeks Status Date Met   PAIN: Pt will report worst pain of 0/10 in order to progress toward max functional ability and improve quality of life [x] Progressing  [] Met  [] Not Met     FUNCTION: Patient will demonstrate improved function as indicated by a score of greater than or equal to 80 out of 100 on FOTO. [x] Progressing  [] Met  [] Not Met     STRENGTH: Patient will improve strength to Manual muscle test 5/5 overall in order to improve functional independence and quality of life.  [x] Progressing  [] Met  [] Not Met     GAIT: Patient will demonstrate normalized gait mechanics with minimal compensation in order to return to PLOF. [x] Progressing  [] Met  [] Not Met     Patient will return to normal ADL's, IADL's, community involvement, recreational activities, and work-related activities with less than or equal to 0/10 pain and maximal function.  [x] Progressing  [] Met  [] Not Met        Plan      Plan of care Certification: 12/7/2023 to 1/26/2024.     Outpatient Physical Therapy 2 times weekly for 6 weeks to include any combination of the following interventions: virtual visits, dry needling, modalities, electrical stimulation (IFC, Pre-Mod, Attended with Functional Dry Needling), Cervical/Lumbar Traction, Electrical Stimulation IFC, Gait Training, Manual Therapy, Moist Heat/ Ice, Neuromuscular Re-ed, Patient Education, Self Care, Therapeutic Activities, Therapeutic Exercise, and Therapeutic Activites      Faustino Pierson,  PT, DPT

## 2024-03-14 ENCOUNTER — CLINICAL SUPPORT (OUTPATIENT)
Dept: REHABILITATION | Facility: HOSPITAL | Age: 29
End: 2024-03-14
Attending: ORTHOPAEDIC SURGERY
Payer: OTHER MISCELLANEOUS

## 2024-03-14 DIAGNOSIS — M25.512 ACUTE PAIN OF LEFT SHOULDER: Primary | ICD-10-CM

## 2024-03-14 PROCEDURE — 97140 MANUAL THERAPY 1/> REGIONS: CPT | Mod: PO | Performed by: PHYSICAL THERAPIST

## 2024-03-14 PROCEDURE — 97112 NEUROMUSCULAR REEDUCATION: CPT | Mod: PO | Performed by: PHYSICAL THERAPIST

## 2024-03-14 PROCEDURE — 97110 THERAPEUTIC EXERCISES: CPT | Mod: PO | Performed by: PHYSICAL THERAPIST

## 2024-03-14 NOTE — PROGRESS NOTES
[facility]   Physical Therapy Treatment Note      Name: Cristina RUFFIN Curiel  Clinic Number: [id number]    Therapy Diagnosis:   Encounter Diagnosis   Name Primary?    Acute pain of left shoulder Yes         Physician: Romeo Lomas IV, MD    Visit Date: 3/14/2024    Physician Orders: PT Eval and Treat  Medical Diagnosis from Referral: S46.012S (ICD-10-CM) - Rotator cuff strain, left, sequela  Evaluation Date: 2023  Authorization Period Expiration: 2024    Plan of Care Expiration: 2024, 12 VISITS TOTAL - 6 WEEKS   Progress Note Due: 2023  Visit # / Visits authorized:  3/12   FOTO: 2/3 (last performed on 2024) - 61%     Precautions: Standard  Working status: Out of work     Time In: 11:00 PM  Time Out: 11:55 PM   Total Billable Time (timed & untimed codes): 53 minutes    PTA Visit #: 0       Subjective     Patient reports: some discomfort and tightness in posterior shoulder.     She was compliant with home exercise program.  Response to previous treatment: aramal  Functional change: n/a    Pain: /10  Location: left shoulder     Objective        Treatment     Cristina received the treatments listed below:      therapeutic exercises to develop strength, endurance, ROM, flexibility, posture, and core stabilization for 10 minutes including:    UBE L2.0 4' fwd, 4' back  Bicep curls 4# 3x8      manual therapy techniques: Joint mobilizations, Manual traction, Myofacial release, Manual Lymphatic Drainage, and Soft tissue Mobilization were applied to the: left upper trapezius cupping for 10 minutes  Cupping with manual therapy on scapular and RC  ASTYM to left shoulder girdle    neuromuscular re-education activities to improve: Balance, Coordination, Kinesthetic, Sense, Proprioception, and Posture for 8 minutes. The following activities were included:    Pec stretchin sec x3   Shoulder horizontal abd stretchin sec x3   Serratus anterior: GTB 10 x3       therapeutic activities  to improve functional performance for 25 minutes, including:    Standing pull: 10 x3 GTB  Wall slides: 10 x3   Standing ER walkouts RTB 15 reps  BUE arm raises RTB 3x8  Standing scaption holds 1# 3x8    Prone rowing: 10 x3 4 #  Prone YTI : 10 x2  Biceps curl //bar 10 x3       Patient Education and Home Exercises       Education provided:   - self-care    Written Home Exercises Provided: yes. Exercises were reviewed and Cristina was able to demonstrate them prior to the end of the session.  Cristina demonstrated good  understanding of the education provided. See Electronic Medical Record under Patient Instructions for exercises provided during therapy sessions    Assessment     Pt doing well overall. Left shoulder posterior pain and burning with heavier resistance. Will benefit from further L RTC strengthening progression.     Cristina Is progressing well towards her goals.   Patient prognosis is Good.     Patient will continue to benefit from skilled outpatient physical therapy to address the deficits listed in the problem list box on initial evaluation, provide pt/family education and to maximize pt's level of independence in the home and community environment.     Patient's spiritual, cultural and educational needs considered and pt agreeable to plan of care and goals.     Anticipated barriers to physical therapy:     Short Term Goals:  2 weeks Status  Date Met   PAIN: Pt will report worst pain of 1/10 in order to progress toward max functional ability and improve quality of life. [x] Progressing  [] Met  [] Not Met     FUNCTION: Patient will demonstrate improved function as indicated by a score of greater than or equal to 55 out of 100 on FOTO. [x] Progressing  [] Met  [] Not Met     MOBILITY: Patient will improve AROM to equal bilaterally in order to progress towards independence with functional activities.  [x] Progressing  [] Met  [] Not Met     STRENGTH: Patient will improve strength to 50% of stated goals, listed in  objective measures above, in order to progress towards independence with functional activities. [x] Progressing  [] Met  [] Not Met     POSTURE: Patient will correct postural deviations in sitting and standing, to decrease pain and promote long term stability.  [x] Progressing  [] Met  [] Not Met     HEP: Patient will demonstrate independence with HEP in order to progress toward functional independence. [x] Progressing  [] Met  [] Not Met        Long Term Goals:  6 weeks Status Date Met   PAIN: Pt will report worst pain of 0/10 in order to progress toward max functional ability and improve quality of life [x] Progressing  [] Met  [] Not Met     FUNCTION: Patient will demonstrate improved function as indicated by a score of greater than or equal to 80 out of 100 on FOTO. [x] Progressing  [] Met  [] Not Met     STRENGTH: Patient will improve strength to Manual muscle test 5/5 overall in order to improve functional independence and quality of life.  [x] Progressing  [] Met  [] Not Met     GAIT: Patient will demonstrate normalized gait mechanics with minimal compensation in order to return to PLOF. [x] Progressing  [] Met  [] Not Met     Patient will return to normal ADL's, IADL's, community involvement, recreational activities, and work-related activities with less than or equal to 0/10 pain and maximal function.  [x] Progressing  [] Met  [] Not Met        Plan      Plan of care Certification: 12/7/2023 to 1/26/2024.     Outpatient Physical Therapy 2 times weekly for 6 weeks to include any combination of the following interventions: virtual visits, dry needling, modalities, electrical stimulation (IFC, Pre-Mod, Attended with Functional Dry Needling), Cervical/Lumbar Traction, Electrical Stimulation IFC, Gait Training, Manual Therapy, Moist Heat/ Ice, Neuromuscular Re-ed, Patient Education, Self Care, Therapeutic Activities, Therapeutic Exercise, and Therapeutic Activites      Faustino Pierson, PT, DPT

## 2024-03-26 ENCOUNTER — CLINICAL SUPPORT (OUTPATIENT)
Dept: REHABILITATION | Facility: HOSPITAL | Age: 29
End: 2024-03-26
Attending: ORTHOPAEDIC SURGERY
Payer: OTHER MISCELLANEOUS

## 2024-03-26 DIAGNOSIS — M25.512 ACUTE PAIN OF LEFT SHOULDER: Primary | ICD-10-CM

## 2024-03-26 PROCEDURE — 97530 THERAPEUTIC ACTIVITIES: CPT | Mod: PO

## 2024-03-26 PROCEDURE — 97110 THERAPEUTIC EXERCISES: CPT | Mod: PO

## 2024-03-26 PROCEDURE — 97112 NEUROMUSCULAR REEDUCATION: CPT | Mod: PO

## 2024-03-26 PROCEDURE — 97140 MANUAL THERAPY 1/> REGIONS: CPT | Mod: PO

## 2024-03-26 NOTE — PROGRESS NOTES
[facility]   Physical Therapy Treatment Note      Name: Cristina RUFFIN Curiel  Clinic Number: [id number]    Therapy Diagnosis:   Encounter Diagnosis   Name Primary?    Acute pain of left shoulder Yes       Physician: Romeo Lomas IV, MD    Visit Date: 3/26/2024    Physician Orders: PT Eval and Treat  Medical Diagnosis from Referral: S46.012S (ICD-10-CM) - Rotator cuff strain, left, sequela  Evaluation Date: 2023  Authorization Period Expiration: 2024    Plan of Care Expiration: 2024, 12 VISITS TOTAL - 6 WEEKS     Progress Note Due: 2023  Visit # / Visits authorized:     FOTO: /3 (last performed on 2024) - 61%     Precautions: Standard  Working status: Out of work     Time In: 1:00 PM  Time Out: 1:55 PM   Total Billable Time (timed & untimed codes): 53 minutes    PTA Visit #:        Subjective     Patient reports: she feels good today     She was compliant with home exercise program.  Response to previous treatment: eval  Functional change: n/a    Pain: /10  Location: left shoulder     Objective        Treatment     Cristina received the treatments listed below:      therapeutic exercises to develop strength, endurance, ROM, flexibility, posture, and core stabilization for 10 minutes including:    UBE L2.0 4' fwd, 4' back  Bicep curls 4# 3x8 with overhead     manual therapy techniques: Joint mobilizations, Manual traction, Myofacial release, Manual Lymphatic Drainage, and Soft tissue Mobilization were applied to the: left upper trapezius cupping for 10 minutes  Cupping with manual therapy on scapular and RC  ASTYM to left shoulder girdle  Subscapularis mobilization     neuromuscular re-education activities to improve: Balance, Coordination, Kinesthetic, Sense, Proprioception, and Posture for 8 minutes. The following activities were included:    Pec stretchin sec x3   Shoulder horizontal abd stretchin sec x3   Serratus anterior: GTB 10 x3       therapeutic  activities to improve functional performance for 25 minutes, including:    Standing pull: 10 x3 GTB  Wall slides: 10 x3   Standing ER walkouts RTB 15 reps  BUE arm raises RTB 3x8  Standing scaption  4# 2x10    Prone rowing: 10 x3 4 #  Prone YTI : 10 x2  Biceps curl //bar 10 x3       Patient Education and Home Exercises       Education provided:   - self-care    Written Home Exercises Provided: yes. Exercises were reviewed and Cristina was able to demonstrate them prior to the end of the session.  Cristina demonstrated good  understanding of the education provided. See Electronic Medical Record under Patient Instructions for exercises provided during therapy sessions    Assessment     Pt states point tenderness at subscapularis with tension relief post manual therapy. Pt reports challenging with scaption 4#. She is still weak on RC. Will benefit from scapular - GH moving mechanism correction with further L RTC strengthening progression.     Cristina Is progressing well towards her goals.   Patient prognosis is Good.     Patient will continue to benefit from skilled outpatient physical therapy to address the deficits listed in the problem list box on initial evaluation, provide pt/family education and to maximize pt's level of independence in the home and community environment.     Patient's spiritual, cultural and educational needs considered and pt agreeable to plan of care and goals.     Anticipated barriers to physical therapy:     Short Term Goals:  2 weeks Status  Date Met   PAIN: Pt will report worst pain of 1/10 in order to progress toward max functional ability and improve quality of life. [x] Progressing  [] Met  [] Not Met     FUNCTION: Patient will demonstrate improved function as indicated by a score of greater than or equal to 55 out of 100 on FOTO. [x] Progressing  [] Met  [] Not Met     MOBILITY: Patient will improve AROM to equal bilaterally in order to progress towards independence with functional activities.   [x] Progressing  [] Met  [] Not Met     STRENGTH: Patient will improve strength to 50% of stated goals, listed in objective measures above, in order to progress towards independence with functional activities. [x] Progressing  [] Met  [] Not Met     POSTURE: Patient will correct postural deviations in sitting and standing, to decrease pain and promote long term stability.  [x] Progressing  [] Met  [] Not Met     HEP: Patient will demonstrate independence with HEP in order to progress toward functional independence. [x] Progressing  [] Met  [] Not Met        Long Term Goals:  6 weeks Status Date Met   PAIN: Pt will report worst pain of 0/10 in order to progress toward max functional ability and improve quality of life [x] Progressing  [] Met  [] Not Met     FUNCTION: Patient will demonstrate improved function as indicated by a score of greater than or equal to 80 out of 100 on FOTO. [x] Progressing  [] Met  [] Not Met     STRENGTH: Patient will improve strength to Manual muscle test 5/5 overall in order to improve functional independence and quality of life.  [x] Progressing  [] Met  [] Not Met     GAIT: Patient will demonstrate normalized gait mechanics with minimal compensation in order to return to PLOF. [x] Progressing  [] Met  [] Not Met     Patient will return to normal ADL's, IADL's, community involvement, recreational activities, and work-related activities with less than or equal to 0/10 pain and maximal function.  [x] Progressing  [] Met  [] Not Met        Plan      Plan of care Certification: 12/7/2023 to 1/26/2024.     Outpatient Physical Therapy 2 times weekly for 6 weeks to include any combination of the following interventions: virtual visits, dry needling, modalities, electrical stimulation (IFC, Pre-Mod, Attended with Functional Dry Needling), Cervical/Lumbar Traction, Electrical Stimulation IFC, Gait Training, Manual Therapy, Moist Heat/ Ice, Neuromuscular Re-ed, Patient Education, Self Care,  Therapeutic Activities, Therapeutic Exercise, and Therapeutic Activites      Faustino Pierson, PT, DPT

## 2024-03-27 ENCOUNTER — OFFICE VISIT (OUTPATIENT)
Dept: ORTHOPEDICS | Facility: CLINIC | Age: 29
End: 2024-03-27
Payer: OTHER MISCELLANEOUS

## 2024-03-27 VITALS — WEIGHT: 125 LBS | BODY MASS INDEX: 22.15 KG/M2 | HEIGHT: 63 IN

## 2024-03-27 DIAGNOSIS — M75.22 BICEPS TENDONITIS ON LEFT: ICD-10-CM

## 2024-03-27 DIAGNOSIS — S46.012S ROTATOR CUFF STRAIN, LEFT, SEQUELA: Primary | ICD-10-CM

## 2024-03-27 PROCEDURE — 99213 OFFICE O/P EST LOW 20 MIN: CPT | Mod: S$GLB,,, | Performed by: ORTHOPAEDIC SURGERY

## 2024-03-27 PROCEDURE — 99999 PR PBB SHADOW E&M-EST. PATIENT-LVL III: CPT | Mod: PBBFAC,,, | Performed by: ORTHOPAEDIC SURGERY

## 2024-03-27 NOTE — PROGRESS NOTES
[facility], Orthopedics and Sports Medicine  [facility]    Established Patient Shoulder Office Visit  2024     Diagnosis:  Left rotator cuff strain  Biceps tendonitis     Subjective:      Cristina Curiel is a 28 y.o. female who presents for follow up treatment of the above mentioned diagnosis.  Overall the patient's symptoms are resolved.  The patient is currently in physical therapy, which is improving the overall condition.     Injury first occurred in late 2023 while working as a . The pain has resolved and her stregnth is gradually improviong in her left shoulder. She says she feels ready to return back to work.     [facility] [employer] [facility].    Outside reports reviewed: historical medical records, office notes, radiology reports, and x-ray reports.    History reviewed. No pertinent past medical history.    Patient Active Problem List   Diagnosis    Wrist pain, acute, right    Acute pain of left shoulder    Post-traumatic stiffness of left shoulder joint    Anxiety       Past Surgical History:   Procedure Laterality Date    ANTERIOR CRUCIATE LIGAMENT REPAIR      HERNIA REPAIR          Current Outpatient Medications   Medication Instructions    busPIRone (BUSPAR) 7.5 mg, Oral, 2 times daily PRN        Review of patient's allergies indicates:   Allergen Reactions    Pineapple Hives    Zithromax [azithromycin]        Social History     Socioeconomic History    Marital status: Single   Tobacco Use    Smoking status: Former     Types: Vaping with nicotine     Start date: 2020     Quit date: 2023     Years since quittin.3    Smokeless tobacco: Never   Substance and Sexual Activity    Alcohol use: Yes     Alcohol/week: 1.0 standard drink of alcohol     Types: 1 Drinks containing 0.5 oz of alcohol per week     Comment: occas    Drug use: Not Currently     Types: Marijuana     Comment: daily-    Sexual activity: Yes     Partners: Female     Birth  control/protection: None, Implant     Comment: Condom sometimes       Family History   Problem Relation Age of Onset    Diabetes Mother     Breast cancer Neg Hx     Ovarian cancer Neg Hx     Colon cancer Neg Hx          Review of Systems   Constitutional: Negative for chills and fever.   HENT:  Negative for congestion.    Eyes:  Negative for blurred vision.   Cardiovascular:  Negative for chest pain, dyspnea on exertion and palpitations.   Respiratory:  Negative for cough and shortness of breath.    Hematologic/Lymphatic: Negative for bleeding problem. Does not bruise/bleed easily.   Skin:  Negative for color change and rash.   Musculoskeletal:  Negative for joint pain and joint swelling.   Gastrointestinal:  Negative for abdominal pain.   Genitourinary:  Negative for bladder incontinence.   Neurological:  Negative for focal weakness, numbness and paresthesias.   Psychiatric/Behavioral:  Negative for depression. The patient is not nervous/anxious.           Objective:      General    Constitutional: She is oriented to person, place, and time. She appears well-developed and well-nourished.   HENT:   Head: Normocephalic and atraumatic.   Cardiovascular:  Normal rate and regular rhythm.            Pulmonary/Chest: Effort normal and breath sounds normal.   Abdominal: Soft.   Neurological: She is alert and oriented to person, place, and time.   Psychiatric: She has a normal mood and affect. Her behavior is normal.         Right Shoulder Exam     Inspection/Observation   Swelling: absent  Deformity: absent  Atrophy: absent    Tenderness   The patient is experiencing no tenderness.    Range of Motion   Active abduction:  170 normal   Forward Flexion:  170 normal   External Rotation 0 degrees:  70 normal   Internal rotation 0 degrees:  T5 normal     Tests & Signs   Drop arm: negative  Doss test: negative  Impingement: negative  Active Compression Test ([place]'s Sign): negative  Speed's Test: negative    Other    Sensation: normal    Comments:  Sukhi Test Negative    Left Shoulder Exam     Inspection/Observation   Swelling: absent  Deformity: absent  Atrophy: absent    Tenderness   The patient is experiencing no tenderness.     Range of Motion   Active abduction:  170 normal   Forward Flexion:  170 normal   External Rotation 0 degrees:  70 normal   Internal rotation 0 degrees:  T6 normal     Tests & Signs   Drop arm: negative  Doss test: negative  Impingement: negative  Active Compression test ([place]'s Sign): negative  Speed's Test: negative    Other   Sensation: normal     Comments:  Sukhi Test Negative      Muscle Strength   Right Upper Extremity   Shoulder Abduction: 5/5   Shoulder Internal Rotation: 5/5   Shoulder External Rotation: 5/5   Biceps: 5/5   Left Upper Extremity  Shoulder Abduction: 5/5   Shoulder Internal Rotation: 5/5   Shoulder External Rotation: 5/5   Biceps: 5/5     Vascular Exam     Right Pulses      Radial:                    2+      Left Pulses      Radial:                    2+      Imaging:  None today    Procedures        Assessment:       Cristina Curiel is a 28 y.o. female seen in the office today. The primary encounter diagnosis was Rotator cuff strain, left, sequela. A diagnosis of Biceps tendonitis on left was also pertinent to this visit.  Non-operative treatment is recommended at this time. Can finish course of physical therapy and continue performing home exercises. Patient was provided a letter to return to work with no restrictions on 4/1/2024. The natural history and expected course discussed with patient. Various treatment options were discussed, including their risks and benefits. All of the patient's questions were answered.     Plan:      Continue physical therapy as currently ordered.   Tylenol 650mg TID, PRN pain.  Follow up as needed if symptoms worsen.         Romeo Lomas IV, MD   of Clinical Orthopedics  Department of Orthopedic Surgery  [facility]  [facility]  Office: [phone]  Website: [email or web address]    ---------------------------------------

## 2024-03-27 NOTE — LETTER
March 27, 2024      [facility]  [address]  [address]  [place] [address]  Phone: [phone]  Fax: [phone]       Patient: Cristina Curiel   YOB: 1995  Date of Visit: 03/27/2024    To Whom It May Concern:    Carmelita Curiel  was at [facility] on 03/27/2024. The patient may return to work on 4/1/2024 with no restrictions. If you have any questions or concerns, or if I can be of further assistance, please do not hesitate to contact me.    Sincerely,    Kulwinder Carr, DO

## 2024-04-02 ENCOUNTER — OFFICE VISIT (OUTPATIENT)
Dept: FAMILY MEDICINE | Facility: CLINIC | Age: 29
End: 2024-04-02
Payer: COMMERCIAL

## 2024-04-02 DIAGNOSIS — F41.9 ANXIETY: ICD-10-CM

## 2024-04-02 DIAGNOSIS — J30.89 ENVIRONMENTAL AND SEASONAL ALLERGIES: Primary | ICD-10-CM

## 2024-04-02 PROCEDURE — 99214 OFFICE O/P EST MOD 30 MIN: CPT | Mod: 95,,, | Performed by: NURSE PRACTITIONER

## 2024-04-02 PROCEDURE — 1159F MED LIST DOCD IN RCRD: CPT | Mod: CPTII,95,, | Performed by: NURSE PRACTITIONER

## 2024-04-02 PROCEDURE — 1160F RVW MEDS BY RX/DR IN RCRD: CPT | Mod: CPTII,95,, | Performed by: NURSE PRACTITIONER

## 2024-04-02 PROCEDURE — 3044F HG A1C LEVEL LT 7.0%: CPT | Mod: CPTII,95,, | Performed by: NURSE PRACTITIONER

## 2024-04-02 RX ORDER — SODIUM FLUORIDE 6 MG/ML
PASTE, DENTIFRICE DENTAL
COMMUNITY
Start: 2024-03-21

## 2024-04-02 RX ORDER — FLUTICASONE PROPIONATE 50 MCG
2 SPRAY, SUSPENSION (ML) NASAL DAILY
Qty: 16 G | Refills: 1 | Status: SHIPPED | OUTPATIENT
Start: 2024-04-02

## 2024-04-02 RX ORDER — BROMPHENIRAMINE MALEATE, PSEUDOEPHEDRINE HYDROCHLORIDE, AND DEXTROMETHORPHAN HYDROBROMIDE 2; 30; 10 MG/5ML; MG/5ML; MG/5ML
10 SYRUP ORAL EVERY 4 HOURS PRN
Qty: 240 ML | Refills: 0 | Status: SHIPPED | OUTPATIENT
Start: 2024-04-02

## 2024-04-02 NOTE — PROGRESS NOTES
Subjective:       Patient ID: Cristina Curiel is a 28 y.o. female.    Chief Complaint: Follow-up (6 weeks F/U) and URI (Runny nose/sneezing/itchy eyes)    Follow-up  Pertinent negatives include no arthralgias, chest pain, headaches, joint swelling, neck pain, vomiting or weakness.   URI   Pertinent negatives include no chest pain, diarrhea, dysuria, headaches, neck pain, rhinorrhea, vomiting or wheezing.       The patient location is: [place]  The chief complaint leading to consultation is: anxiety follow up    Visit type: audiovisual    Face to Face time with patient: 15  15 minutes of total time spent on the encounter, which includes face to face time and non-face to face time preparing to see the patient (eg, review of tests), Obtaining and/or reviewing separately obtained history, Documenting clinical information in the electronic or other health record, Independently interpreting results (not separately reported) and communicating results to the patient/family/caregiver, or Care coordination (not separately reported).         Each patient to whom he or she provides medical services by telemedicine is:  (1) informed of the relationship between the physician and patient and the respective role of any other health care provider with respect to management of the patient; and (2) notified that he or she may decline to receive medical services by telemedicine and may withdraw from such care at any time.    Notes:      Patient is a 28 year old black female with Anxiety that has virtual visit today for medication follow up.  Patient also has complaint of allergies - Runny nose/sneezing/itchy eyes     Anxiety  Reports she has been out of work since November 2023 for rotator cuff injury  She should return to work at end of March  She reports she has increased anxiety, mostly at night - states she has palpitation, overthinking, anxious about being out of work.  She also report social anxiety around large crowds such as  during [place].  She states anxiety episodes around once a week.  Started on Buspar 7.5 mg twice daily PRN anxiety  States the Buspar is effective as needed and using once or twice a week.  Follow up in 6 months.    Environmental and Seasonal Allergies  Complains of Runny nose/sneezing/itchy eyes  Not controlled on Claritin  Will treat with Bromfed DM and Flonase nasal spray  If no improvement in a week - come into office for evaluation.    Review of Systems   Constitutional:  Negative for activity change and unexpected weight change.   HENT:  Negative for hearing loss, rhinorrhea and trouble swallowing.    Eyes:  Negative for discharge and visual disturbance.   Respiratory:  Negative for chest tightness and wheezing.    Cardiovascular:  Negative for chest pain and palpitations.   Gastrointestinal:  Negative for blood in stool, constipation, diarrhea and vomiting.   Endocrine: Negative for polydipsia and polyuria.   Genitourinary:  Negative for difficulty urinating, dysuria, hematuria and menstrual problem.   Musculoskeletal:  Negative for arthralgias, joint swelling and neck pain.   Neurological:  Negative for weakness and headaches.   Psychiatric/Behavioral:  Negative for confusion and dysphoric mood.          Objective:     There were no vitals filed for this visit.       Physical Exam  Constitutional:       General: She is not in acute distress.     Appearance: She is well-developed. She is not diaphoretic.   HENT:      Head: Normocephalic and atraumatic.   Eyes:      General: No scleral icterus.        Right eye: No discharge.         Left eye: No discharge.      Conjunctiva/sclera: Conjunctivae normal.      Pupils: Pupils are equal, round, and reactive to light.   Pulmonary:      Effort: Pulmonary effort is normal. No respiratory distress.   Neurological:      Mental Status: She is alert and oriented to person, place, and time.   Psychiatric:         Behavior: Behavior normal.         Thought Content:  Thought content normal.         Judgment: Judgment normal.           Assessment:         ICD-10-CM ICD-9-CM   1. Environmental and seasonal allergies  J30.89 477.8   2. Anxiety  F41.9 300.00       Plan:       1. Environmental and seasonal allergies  Overview:  Complains of Runny nose/sneezing/itchy eyes  Not controlled on Claritin  Will treat with Bromfed DM and Flonase nasal spray  If no improvement in a week - come into office for evaluation.    Orders:  -     brompheniramine-pseudoeph-DM (BROMFED DM) 2-30-10 mg/5 mL Syrp; Take 10 mLs by mouth every 4 (four) hours as needed (congestion/cough).  Dispense: 240 mL; Refill: 0  -     fluticasone propionate (FLONASE) 50 mcg/actuation nasal spray; 2 sprays (100 mcg total) by Each Nostril route once daily.  Dispense: 16 g; Refill: 1    2. Anxiety  Overview:  Reports she has been out of work since November 2023 for rotator cuff injury  She should return to work at end of March  She reports she has increased anxiety, mostly at night - states she has palpitation, overthinking, anxious about being out of work.  She also report social anxiety around large crowds such as during [place].  She states anxiety episodes around once a week.  Started on Buspar 7.5 mg twice daily PRN anxiety  States the Buspar is effective as needed and using once or twice a week.  Follow up in 6 months.         Follow up in about 6 months (around 10/2/2024) for anxiety follow up.     Patient's Medications   New Prescriptions    BROMPHENIRAMINE-PSEUDOEPH-DM (BROMFED DM) 2-30-10 MG/5 ML SYRP    Take 10 mLs by mouth every 4 (four) hours as needed (congestion/cough).    FLUTICASONE PROPIONATE (FLONASE) 50 MCG/ACTUATION NASAL SPRAY    2 sprays (100 mcg total) by Each Nostril route once daily.   Previous Medications    BUSPIRONE (BUSPAR) 7.5 MG TABLET    Take 1 tablet (7.5 mg total) by mouth 2 (two) times daily as needed (anxiety).    FLUORIDE, SODIUM, (PREVIDENT 5000) 1.1 % PSTE    BRUSH AFTER BREAKFAST  AND BEFORE BEDTIME. DO NOT RINSE AFTER USE.   Modified Medications    No medications on file   Discontinued Medications    No medications on file       History reviewed. No pertinent past medical history.    Past Surgical History:   Procedure Laterality Date    ANTERIOR CRUCIATE LIGAMENT REPAIR      HERNIA REPAIR         Family History   Problem Relation Age of Onset    Diabetes Mother     Breast cancer Neg Hx     Ovarian cancer Neg Hx     Colon cancer Neg Hx        Social History     Socioeconomic History    Marital status: Single   Tobacco Use    Smoking status: Former     Types: Vaping with nicotine     Start date: 2020     Quit date: 2023     Years since quittin.3    Smokeless tobacco: Never   Substance and Sexual Activity    Alcohol use: Yes     Alcohol/week: 1.0 standard drink of alcohol     Types: 1 Drinks containing 0.5 oz of alcohol per week     Comment: occas    Drug use: Not Currently     Types: Marijuana     Comment: daily-    Sexual activity: Yes     Partners: Female     Birth control/protection: None, Implant     Comment: Condom sometimes     Social Determinants of Health     Financial Resource Strain: Low Risk  (2024)    Overall Financial Resource Strain (CARDIA)     Difficulty of Paying Living Expenses: Not very hard   Food Insecurity: No Food Insecurity (2024)    Hunger Vital Sign     Worried About Running Out of Food in the Last Year: Never true     Ran Out of Food in the Last Year: Never true   Transportation Needs: No Transportation Needs (2024)    PRAPARE - Transportation     Lack of Transportation (Medical): No     Lack of Transportation (Non-Medical): No   Physical Activity: Unknown (2024)    Exercise Vital Sign     Days of Exercise per Week: 4 days   Stress: Stress Concern Present (2024)    [place] [employer] - Occupational Stress Questionnaire     Feeling of Stress : To some extent   Social Connections: Unknown (2024)    Social  Connection and Isolation Panel [NHANES]     Frequency of Communication with Friends and Family: More than three times a week     Frequency of Social Gatherings with Friends and Family: More than three times a week     Active Member of Clubs or Organizations: Patient declined     Attends Club or Organization Meetings: Patient declined     Marital Status: Living with partner   Housing Stability: Low Risk  (4/2/2024)    Housing Stability Vital Sign     Unable to Pay for Housing in the Last Year: No     Number of Places Lived in the Last Year: 1     Unstable Housing in the Last Year: No

## 2024-08-18 ENCOUNTER — HOSPITAL ENCOUNTER (EMERGENCY)
Facility: HOSPITAL | Age: 29
Discharge: HOME | End: 2024-08-18
Attending: FAMILY MEDICINE
Payer: COMMERCIAL

## 2024-08-18 VITALS
HEIGHT: 62 IN | RESPIRATION RATE: 18 BRPM | TEMPERATURE: 98 F | SYSTOLIC BLOOD PRESSURE: 137 MMHG | OXYGEN SATURATION: 99 % | WEIGHT: 127 LBS | HEART RATE: 67 BPM | DIASTOLIC BLOOD PRESSURE: 61 MMHG | BODY MASS INDEX: 23.37 KG/M2

## 2024-08-18 DIAGNOSIS — T78.40XA ALLERGIC REACTION, INITIAL ENCOUNTER: Primary | ICD-10-CM

## 2024-08-18 PROCEDURE — 25000003 PHARM REV CODE 250: Mod: ER | Performed by: FAMILY MEDICINE

## 2024-08-18 PROCEDURE — 99283 EMERGENCY DEPT VISIT LOW MDM: CPT | Mod: ER

## 2024-08-18 PROCEDURE — 63600175 PHARM REV CODE 636 W HCPCS: Mod: ER | Performed by: FAMILY MEDICINE

## 2024-08-18 RX ORDER — DIPHENHYDRAMINE HCL 25 MG
25 CAPSULE ORAL
Status: COMPLETED | OUTPATIENT
Start: 2024-08-18 | End: 2024-08-18

## 2024-08-18 RX ORDER — DIPHENHYDRAMINE HCL 25 MG
25 CAPSULE ORAL EVERY 6 HOURS PRN
Qty: 20 CAPSULE | Refills: 0 | Status: SHIPPED | OUTPATIENT
Start: 2024-08-18

## 2024-08-18 RX ORDER — PREDNISONE 20 MG/1
20 TABLET ORAL 2 TIMES DAILY
Qty: 10 TABLET | Refills: 0 | Status: SHIPPED | OUTPATIENT
Start: 2024-08-18

## 2024-08-18 RX ORDER — PREDNISONE 20 MG/1
60 TABLET ORAL
Status: COMPLETED | OUTPATIENT
Start: 2024-08-18 | End: 2024-08-18

## 2024-08-18 RX ADMIN — DIPHENHYDRAMINE HYDROCHLORIDE 25 MG: 25 CAPSULE ORAL at 10:08

## 2024-08-18 RX ADMIN — PREDNISONE 60 MG: 20 TABLET ORAL at 10:08

## 2024-08-18 NOTE — Clinical Note
"Cristina Mendezalpesh Curiel was seen and treated in our emergency department on 8/18/2024.  She may return to work on 08/21/2024.       If you have any questions or concerns, please don't hesitate to call.      Zackery Monterroso MD"

## 2024-08-18 NOTE — ED PROVIDER NOTES
Encounter Date: 2024       History     Chief Complaint   Patient presents with    Allergic Reaction     PT states she woke up this morning and her eyes were twitching and burning and when she looked in the mirror her eyes were puffy. Pt states she does not remember eating anything she is allergic to. Manny sob or scratchy throat      28-year-old male presents to ED with swelling of her lower eyelids on both sides and itching for last 2 days.  Patient also noticed mild itching in upper limbs.  No lip or tongue swelling.  No throat swelling.  No wheezing or shortness of breath.  Patient denies food allergies.  Review of her past history shows patient is allergic to pineapple and Zithromax.    The history is provided by the patient.     Review of patient's allergies indicates:   Allergen Reactions    Pineapple Hives    Zithromax [azithromycin]      History reviewed. No pertinent past medical history.  Past Surgical History:   Procedure Laterality Date    ANTERIOR CRUCIATE LIGAMENT REPAIR      HERNIA REPAIR       Family History   Problem Relation Name Age of Onset    Diabetes Mother shireen     Breast cancer Neg Hx      Ovarian cancer Neg Hx      Colon cancer Neg Hx       Social History     Tobacco Use    Smoking status: Former     Types: Vaping with nicotine     Start date: 2020     Quit date: 2023     Years since quittin.7    Smokeless tobacco: Never   Substance Use Topics    Alcohol use: Yes     Alcohol/week: 1.0 standard drink of alcohol     Types: 1 Drinks containing 0.5 oz of alcohol per week     Comment: occas    Drug use: Not Currently     Types: Marijuana     Comment: daily-     Review of Systems   All other systems reviewed and are negative.      Physical Exam     Initial Vitals [24 0946]   BP Pulse Resp Temp SpO2   137/61 67 18 98.3 °F (36.8 °C) 99 %      MAP       --         Physical Exam    Nursing note and vitals reviewed.  Constitutional: Vital signs are normal. She appears  well-developed and well-nourished. She is active. No distress.   HENT:   Head: Normocephalic.   Nose: Nose normal.   Mouth/Throat: Oropharynx is clear and moist and mucous membranes are normal.   Eyes: Conjunctivae and EOM are normal.   Bilateral lower eyelid swelling and periorbital skin wrinkling also noted.   Neck: Neck supple.   Normal range of motion.  Cardiovascular:  Normal rate, regular rhythm, S1 normal, S2 normal and normal heart sounds.           Pulmonary/Chest: Breath sounds normal. No respiratory distress. She has no wheezes. She has no rhonchi. She has no rales. She exhibits no tenderness.   Abdominal: Abdomen is soft. Bowel sounds are normal. She exhibits no distension. There is no abdominal tenderness. There is no rebound and no guarding.   Musculoskeletal:         General: Normal range of motion.      Right upper arm: Normal.      Left upper arm: Normal.      Cervical back: Normal range of motion and neck supple.      Right lower leg: Normal.      Left lower leg: Normal.     Neurological: She is alert and oriented to person, place, and time. She has normal strength. She displays normal reflexes. No cranial nerve deficit or sensory deficit. GCS score is 15. GCS eye subscore is 4. GCS verbal subscore is 5. GCS motor subscore is 6.   Skin: Skin is warm. Capillary refill takes less than 2 seconds.   Psychiatric: She has a normal mood and affect. Her speech is normal and behavior is normal. Thought content normal. Cognition and memory are normal.         ED Course   Procedures  Labs Reviewed - No data to display       Imaging Results    None          Medications   predniSONE tablet 60 mg (has no administration in time range)   diphenhydrAMINE capsule 25 mg (has no administration in time range)     Medical Decision Making  Allergic reaction, contact dermatitis, cosmetic reaction.    Patient is given Benadryl and prednisone advised not to apply any cosmetics until symptoms resolve.  She needs to follow up  with Allergy immunology.  Follow-up ED with any worsening symptoms immediately.                                      Clinical Impression:  Final diagnoses:  [T78.40XA] Allergic reaction, initial encounter (Primary)          ED Disposition Condition    Discharge Stable          ED Prescriptions       Medication Sig Dispense Start Date End Date Auth. Provider    predniSONE (DELTASONE) 20 MG tablet Take 1 tablet (20 mg total) by mouth 2 (two) times daily. 10 tablet 8/18/2024 -- Zackery Monterroso MD    diphenhydrAMINE (BENADRYL) 25 mg capsule Take 1 capsule (25 mg total) by mouth every 6 (six) hours as needed for Itching or Allergies. 20 capsule 8/18/2024 -- Zackery Monterroso MD          Follow-up Information       Follow up With Specialties Details Why Contact Info    Minerva Harrell, NP Family Medicine Schedule an appointment as soon as possible for a visit in 1 week For  re-check [address] [facility]  [address]  [place] [address]  [phone]               Zackery Monterroso MD  08/18/24 1014

## 2024-08-19 ENCOUNTER — OFFICE VISIT (OUTPATIENT)
Dept: FAMILY MEDICINE | Facility: CLINIC | Age: 29
End: 2024-08-19
Payer: COMMERCIAL

## 2024-08-19 DIAGNOSIS — F51.01 PRIMARY INSOMNIA: Primary | ICD-10-CM

## 2024-08-19 DIAGNOSIS — R21 RASH OF FACE: ICD-10-CM

## 2024-08-19 PROBLEM — M25.612 POST-TRAUMATIC STIFFNESS OF LEFT SHOULDER JOINT: Status: RESOLVED | Noted: 2023-12-07 | Resolved: 2024-08-19

## 2024-08-19 PROBLEM — M25.512 ACUTE PAIN OF LEFT SHOULDER: Status: RESOLVED | Noted: 2023-12-07 | Resolved: 2024-08-19

## 2024-08-19 PROBLEM — M25.531 WRIST PAIN, ACUTE, RIGHT: Status: RESOLVED | Noted: 2022-08-01 | Resolved: 2024-08-19

## 2024-08-19 PROCEDURE — 3044F HG A1C LEVEL LT 7.0%: CPT | Mod: CPTII,95,, | Performed by: NURSE PRACTITIONER

## 2024-08-19 PROCEDURE — 1160F RVW MEDS BY RX/DR IN RCRD: CPT | Mod: CPTII,95,, | Performed by: NURSE PRACTITIONER

## 2024-08-19 PROCEDURE — 99214 OFFICE O/P EST MOD 30 MIN: CPT | Mod: 95,,, | Performed by: NURSE PRACTITIONER

## 2024-08-19 PROCEDURE — 1159F MED LIST DOCD IN RCRD: CPT | Mod: CPTII,95,, | Performed by: NURSE PRACTITIONER

## 2024-08-19 RX ORDER — HYDROXYZINE HYDROCHLORIDE 25 MG/1
25 TABLET, FILM COATED ORAL NIGHTLY
Qty: 30 TABLET | Refills: 5 | Status: SHIPPED | OUTPATIENT
Start: 2024-08-19

## 2024-08-19 NOTE — PROGRESS NOTES
Subjective:       Patient ID: Cristina Curiel is a 28 y.o. female.    Chief Complaint: Insomnia (Not sleeping a night) and Allergic Reaction (Was sent in ED at night)    The patient location is: [place], [place]  The chief complaint leading to consultation is:  insomnia, skin rash to face    Visit type: audiovisual    Face to Face time with patient: 15  25 minutes of total time spent on the encounter, which includes face to face time and non-face to face time preparing to see the patient (eg, review of tests), Obtaining and/or reviewing separately obtained history, Documenting clinical information in the electronic or other health record, Independently interpreting results (not separately reported) and communicating results to the patient/family/caregiver, or Care coordination (not separately reported).         Each patient to whom he or she provides medical services by telemedicine is:  (1) informed of the relationship between the physician and patient and the respective role of any other health care provider with respect to management of the patient; and (2) notified that he or she may decline to receive medical services by telemedicine and may withdraw from such care at any time.    Notes:    HPI WITH ASSESSMENT AND PLAN OF CARE:      Patient is a 28 year old black female with Anxiety and Environmental/Seasonal Allergies that is has virtual visit for complaints below:      Insomnia  Started having insomnia in the past 2 weeks  Problems falling asleep only - able to stay asleep once sleeping  Reports increased anxiousness at night, feels like heart racing at time, chest tightness  Will START Hydroxyzine 25 mg at bedtime, if after 2 weeks and is ineffective - can increase to 50 mg at bedtime  Can consider Amitriptyline or Trazodone if hydroxyzine ineffective  Follow up in 4 weeks for medication follow up.      Rash to Left EYE/Allergic Reaction  Started 1 month ago with dry skin to upper and lower eyelid  States was using  different facial solutions like Nozema and other facial products so not sure what caused it  Skin dry, irritated with hyperpigmented skin below the eyelid as well as peeling skin  Reports went to ER on 8/18/24 because she woke up and eyes were burning and both eyes were puffy.  Treated with oral Prednisone and Benadryl and discharged home  Advised patient that the hydroxyzine prescribed for insomnia will help with any reaction as it serves the same as benadryl so advised NOT to take both medications.  Advised STOP all use of OTC facial products  Can use Cerave moisterizing facial cream alone to the dry areas of skin only   Make appointment with [facility] to evaluate and treat eye  See pictures below.        There were no vitals filed for this visit.      Diagnoses this Encounter:         ICD-10-CM ICD-9-CM   1. Primary insomnia  F51.01 307.42   2. Rash of face  R21 782.1       Orders Placed This Encounter    hydrOXYzine HCL (ATARAX) 25 MG tablet        Follow up in about 4 weeks (around 9/16/2024) for medication follow up.     Patient's Medications   New Prescriptions    HYDROXYZINE HCL (ATARAX) 25 MG TABLET    Take 1 tablet (25 mg total) by mouth every evening.   Previous Medications    DIPHENHYDRAMINE (BENADRYL) 25 MG CAPSULE    Take 1 capsule (25 mg total) by mouth every 6 (six) hours as needed for Itching or Allergies.    PREDNISONE (DELTASONE) 20 MG TABLET    Take 1 tablet (20 mg total) by mouth 2 (two) times daily.   Modified Medications    No medications on file   Discontinued Medications    BROMPHENIRAMINE-PSEUDOEPH-DM (BROMFED DM) 2-30-10 MG/5 ML SYRP    Take 10 mLs by mouth every 4 (four) hours as needed (congestion/cough).    BUSPIRONE (BUSPAR) 7.5 MG TABLET    Take 1 tablet (7.5 mg total) by mouth 2 (two) times daily as needed (anxiety).    FLUORIDE, SODIUM, (PREVIDENT 5000) 1.1 % PSTE    BRUSH AFTER BREAKFAST AND BEFORE BEDTIME. DO NOT RINSE AFTER USE.    FLUTICASONE PROPIONATE (FLONASE) 50  MCG/ACTUATION NASAL SPRAY    2 sprays (100 mcg total) by Each Nostril route once daily.         Review of Systems   Constitutional:  Negative for fever.   HENT:  Negative for congestion, rhinorrhea and sore throat.    Eyes:  Positive for pain.   Respiratory:  Negative for cough and shortness of breath.    Gastrointestinal:  Negative for diarrhea and vomiting.   Skin:  Positive for rash.   Psychiatric/Behavioral:  Positive for sleep disturbance.          Objective:        Physical Exam  Constitutional:       General: She is not in acute distress.     Appearance: She is well-developed. She is not diaphoretic.   HENT:      Head: Normocephalic and atraumatic.   Eyes:      General: No scleral icterus.        Right eye: No discharge.         Left eye: No discharge.      Conjunctiva/sclera: Conjunctivae normal.   Pulmonary:      Effort: Pulmonary effort is normal. No respiratory distress.   Skin:     Findings: Rash present.      Comments: + dry skin to upper and lower left eyelid with hyperpigmentation under the eyes.  See picture above.   Neurological:      Mental Status: She is alert and oriented to person, place, and time.   Psychiatric:         Behavior: Behavior normal.         Thought Content: Thought content normal.         Judgment: Judgment normal.             Past Medical History:   Diagnosis Date    Anxiety 02/26/2024    Reports she has been out of work since November 2023 for rotator cuff injury  She should return to work at end of March  She reports she has increased anxiety, mostly at night - states she has palpitation, overthinking, anxious about being out of work.  She also report social anxiety around large crowds such as during [place].  She states anxiety episodes around once a week.  Started on Buspar     Environmental and seasonal allergies 04/02/2024    Complains of Runny nose/sneezing/itchy eyes  Not controlled on Claritin  Will treat with Bromfed DM and Flonase nasal spray  If no improvement in a  week - come into office for evaluation.         Past Surgical History:   Procedure Laterality Date    ANTERIOR CRUCIATE LIGAMENT REPAIR      HERNIA REPAIR         Family History   Problem Relation Name Age of Onset    Diabetes Mother shireen     Breast cancer Neg Hx      Ovarian cancer Neg Hx      Colon cancer Neg Hx         Social History     Socioeconomic History    Marital status: Single   Tobacco Use    Smoking status: Former     Types: Vaping with nicotine     Start date: 2020     Quit date: 2023     Years since quittin.7    Smokeless tobacco: Never   Substance and Sexual Activity    Alcohol use: Yes     Alcohol/week: 1.0 standard drink of alcohol     Types: 1 Drinks containing 0.5 oz of alcohol per week     Comment: occas    Drug use: Not Currently     Types: Marijuana     Comment: daily-    Sexual activity: Yes     Partners: Female     Birth control/protection: None, Implant     Comment: Condom sometimes     Social Determinants of Health     Financial Resource Strain: Low Risk  (2024)    Overall Financial Resource Strain (CARDIA)     Difficulty of Paying Living Expenses: Not very hard   Food Insecurity: No Food Insecurity (2024)    Hunger Vital Sign     Worried About Running Out of Food in the Last Year: Never true     Ran Out of Food in the Last Year: Never true   Transportation Needs: No Transportation Needs (2024)    PRAPARE - Transportation     Lack of Transportation (Medical): No     Lack of Transportation (Non-Medical): No   Physical Activity: Unknown (2024)    Exercise Vital Sign     Days of Exercise per Week: 4 days   Stress: Stress Concern Present (2024)    [place] [employer] - Occupational Stress Questionnaire     Feeling of Stress : To some extent   Housing Stability: Low Risk  (2024)    Housing Stability Vital Sign     Unable to Pay for Housing in the Last Year: No     Number of Places Lived in the Last Year: 1     Unstable Housing in the  Last Year: No

## 2024-09-04 ENCOUNTER — TELEPHONE (OUTPATIENT)
Dept: ADMINISTRATIVE | Facility: HOSPITAL | Age: 29
End: 2024-09-04
Payer: COMMERCIAL

## 2025-01-07 ENCOUNTER — LAB VISIT (OUTPATIENT)
Dept: LAB | Facility: HOSPITAL | Age: 30
End: 2025-01-07
Attending: NURSE PRACTITIONER
Payer: COMMERCIAL

## 2025-01-07 ENCOUNTER — OFFICE VISIT (OUTPATIENT)
Dept: OBSTETRICS AND GYNECOLOGY | Facility: CLINIC | Age: 30
End: 2025-01-07
Payer: COMMERCIAL

## 2025-01-07 VITALS
HEIGHT: 62 IN | DIASTOLIC BLOOD PRESSURE: 72 MMHG | BODY MASS INDEX: 24.14 KG/M2 | SYSTOLIC BLOOD PRESSURE: 110 MMHG | WEIGHT: 131.19 LBS

## 2025-01-07 DIAGNOSIS — Z11.3 SCREEN FOR STD (SEXUALLY TRANSMITTED DISEASE): ICD-10-CM

## 2025-01-07 DIAGNOSIS — Z01.419 WOMEN'S ANNUAL ROUTINE GYNECOLOGICAL EXAMINATION: Primary | ICD-10-CM

## 2025-01-07 DIAGNOSIS — R61 NIGHT SWEAT: ICD-10-CM

## 2025-01-07 LAB
ESTRADIOL SERPL-MCNC: 36 PG/ML
FSH SERPL-ACNC: 6.03 MIU/ML
HAV IGM SERPL QL IA: NORMAL
HBV CORE IGM SERPL QL IA: NORMAL
HBV SURFACE AG SERPL QL IA: NORMAL
HCV AB SERPL QL IA: NORMAL
HIV 1+2 AB+HIV1 P24 AG SERPL QL IA: NORMAL
LH SERPL-ACNC: 6.6 MIU/ML
TREPONEMA PALLIDUM IGG+IGM AB [PRESENCE] IN SERUM OR PLASMA BY IMMUNOASSAY: NONREACTIVE

## 2025-01-07 PROCEDURE — 3078F DIAST BP <80 MM HG: CPT | Mod: CPTII,S$GLB,, | Performed by: NURSE PRACTITIONER

## 2025-01-07 PROCEDURE — 83002 ASSAY OF GONADOTROPIN (LH): CPT | Performed by: NURSE PRACTITIONER

## 2025-01-07 PROCEDURE — 1159F MED LIST DOCD IN RCRD: CPT | Mod: CPTII,S$GLB,, | Performed by: NURSE PRACTITIONER

## 2025-01-07 PROCEDURE — 80074 ACUTE HEPATITIS PANEL: CPT | Performed by: NURSE PRACTITIONER

## 2025-01-07 PROCEDURE — 87491 CHLMYD TRACH DNA AMP PROBE: CPT | Performed by: NURSE PRACTITIONER

## 2025-01-07 PROCEDURE — 99999 PR PBB SHADOW E&M-EST. PATIENT-LVL III: CPT | Mod: PBBFAC,,, | Performed by: NURSE PRACTITIONER

## 2025-01-07 PROCEDURE — 36415 COLL VENOUS BLD VENIPUNCTURE: CPT | Mod: PN | Performed by: NURSE PRACTITIONER

## 2025-01-07 PROCEDURE — 3074F SYST BP LT 130 MM HG: CPT | Mod: CPTII,S$GLB,, | Performed by: NURSE PRACTITIONER

## 2025-01-07 PROCEDURE — 3008F BODY MASS INDEX DOCD: CPT | Mod: CPTII,S$GLB,, | Performed by: NURSE PRACTITIONER

## 2025-01-07 PROCEDURE — 99395 PREV VISIT EST AGE 18-39: CPT | Mod: S$GLB,,, | Performed by: NURSE PRACTITIONER

## 2025-01-07 PROCEDURE — 82670 ASSAY OF TOTAL ESTRADIOL: CPT | Performed by: NURSE PRACTITIONER

## 2025-01-07 PROCEDURE — 83001 ASSAY OF GONADOTROPIN (FSH): CPT | Performed by: NURSE PRACTITIONER

## 2025-01-07 PROCEDURE — 81515 NFCT DS BV&VAGINITIS DNA ALG: CPT | Performed by: NURSE PRACTITIONER

## 2025-01-07 PROCEDURE — 87389 HIV-1 AG W/HIV-1&-2 AB AG IA: CPT | Performed by: NURSE PRACTITIONER

## 2025-01-07 PROCEDURE — 86593 SYPHILIS TEST NON-TREP QUANT: CPT | Performed by: NURSE PRACTITIONER

## 2025-01-07 NOTE — PROGRESS NOTES
CC: Annual  HPI: Pt is a 29 y.o.  female who presents for routine annual exam. 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 She is still working in the [employer]. She and her wife have been discussing having a baby- they have 6 yo son. She uses Nexplanon for contraception. No cycles with Nexplanon. She does want STD screening.  She reports night sweats for the past year.   The patient participates in regular exercise: yes.  The patient vapes. She denies any domestic violence.         ROS:  GENERAL: Feeling well overall. Denies fever or chills.   SKIN: Denies rash or lesions.   HEAD: Denies head injury or headache.   NODES: Denies enlarged lymph nodes.   CHEST: Denies chest pain or shortness of breath.   CARDIOVASCULAR: Denies palpitations or left sided chest pain.   ABDOMEN: No abdominal pain, constipation, diarrhea, nausea, vomiting or rectal bleeding.   URINARY: No dysuria, hematuria, or burning on urination.  REPRODUCTIVE: See HPI.   BREASTS: Denies pain, lumps, or nipple discharge.   HEMATOLOGIC: No easy bruisability or excessive bleeding.   MUSCULOSKELETAL: Denies joint pain or swelling.   NEUROLOGIC: Denies syncope or weakness.   PSYCHIATRIC: Denies depression, anxiety or mood swings.    PE:   APPEARANCE: Well nourished, well developed, Black or  female in no acute distress.  NODES: no cervical, supraclavicular, or inguinal lymphadenopathy  BREASTS: Symmetrical, no skin changes or visible lesions. No palpable masses, nipple discharge or adenopathy bilaterally.  ABDOMEN: Soft. No tenderness or masses. No distention. No hernias palpated. No CVA tenderness.  VULVA: No lesions. Normal external female genitalia.  URETHRAL MEATUS: Normal size and location, no lesions, no prolapse.  URETHRA: No masses, tenderness, or prolapse.  VAGINA: Moist. No lesions or lacerations noted. No abnormal discharge present. No odor present.   CERVIX: No lesions or discharge. No cervical motion tenderness.    UTERUS: Normal size, regular shape, mobile, non-tender.  ADNEXA: No tenderness. No fullness or masses palpated in the adnexal regions.   ANUS PERINEUM: Normal.      Diagnosis:  1. Women's annual routine gynecological examination    2. Screen for STD (sexually transmitted disease)    3. Night sweat        Plan:   Pap not indicated- last pap 9/23 WNL  STD screening   Labs for eval of night sweats- discussed hormone panel may not be accurate with Nexplanon in place  Discussed keep log of timing of when the excessive sweating occurs and to note what she ate / drank for the previous 24 hrs to determine if there is any cyclic or dietary link  Can consider supplementing with progesterone at night       Orders Placed This Encounter    C. trachomatis/N. gonorrhoeae by AMP DNA    HIV-1 and HIV-2 antibodies    Treponema Pallidium Antibodies IgG, IgM    Vaginosis Screen by DNA Probe    Hepatitis panel, acute    Follicle Stimulating Hormone    LUTEINIZING HORMONE    Estradiol       Patient was counseled today on the new ACS guidelines for cervical cytology screening as well as the current recommendations for breast cancer screening. She was counseled to follow up with her PCP for other routine health maintenance. Counseling session lasted approximately 10 minutes, and all her questions were answered.    Follow-up with me in 1 year for routine exam    AILIN Matute

## 2025-01-08 ENCOUNTER — PATIENT MESSAGE (OUTPATIENT)
Dept: OBSTETRICS AND GYNECOLOGY | Facility: CLINIC | Age: 30
End: 2025-01-08
Payer: COMMERCIAL

## 2025-01-09 LAB
BACTERIAL VAGINOSIS DNA: NOT DETECTED
C TRACH DNA SPEC QL NAA+PROBE: NOT DETECTED
CANDIDA GLABRATA/KRUSEI: NOT DETECTED
CANDIDA RRNA VAG QL PROBE: NOT DETECTED
N GONORRHOEA DNA SPEC QL NAA+PROBE: NOT DETECTED
TRICHOMONAS VAGINALIS: NOT DETECTED

## 2025-02-05 ENCOUNTER — OFFICE VISIT (OUTPATIENT)
Dept: FAMILY MEDICINE | Facility: CLINIC | Age: 30
End: 2025-02-05
Payer: COMMERCIAL

## 2025-02-05 VITALS
OXYGEN SATURATION: 99 % | WEIGHT: 134.56 LBS | SYSTOLIC BLOOD PRESSURE: 110 MMHG | HEIGHT: 62 IN | HEART RATE: 89 BPM | TEMPERATURE: 98 F | BODY MASS INDEX: 24.76 KG/M2 | DIASTOLIC BLOOD PRESSURE: 72 MMHG

## 2025-02-05 DIAGNOSIS — Z13.220 SCREENING CHOLESTEROL LEVEL: ICD-10-CM

## 2025-02-05 DIAGNOSIS — Z00.00 ENCOUNTER FOR BLOOD TEST FOR ROUTINE GENERAL PHYSICAL EXAMINATION: ICD-10-CM

## 2025-02-05 DIAGNOSIS — J30.2 SEASONAL ALLERGIC RHINITIS, UNSPECIFIED TRIGGER: Primary | ICD-10-CM

## 2025-02-05 DIAGNOSIS — Z13.0 SCREENING FOR DEFICIENCY ANEMIA: ICD-10-CM

## 2025-02-05 DIAGNOSIS — Z13.29 THYROID DISORDER SCREEN: ICD-10-CM

## 2025-02-05 DIAGNOSIS — Z13.1 DIABETES MELLITUS SCREENING: ICD-10-CM

## 2025-02-05 DIAGNOSIS — H10.13 ALLERGIC CONJUNCTIVITIS OF BOTH EYES: ICD-10-CM

## 2025-02-05 DIAGNOSIS — S86.911A KNEE STRAIN, RIGHT, INITIAL ENCOUNTER: ICD-10-CM

## 2025-02-05 PROCEDURE — 3008F BODY MASS INDEX DOCD: CPT | Mod: CPTII,S$GLB,, | Performed by: NURSE PRACTITIONER

## 2025-02-05 PROCEDURE — 1159F MED LIST DOCD IN RCRD: CPT | Mod: CPTII,S$GLB,, | Performed by: NURSE PRACTITIONER

## 2025-02-05 PROCEDURE — 99214 OFFICE O/P EST MOD 30 MIN: CPT | Mod: S$GLB,,, | Performed by: NURSE PRACTITIONER

## 2025-02-05 PROCEDURE — 3074F SYST BP LT 130 MM HG: CPT | Mod: CPTII,S$GLB,, | Performed by: NURSE PRACTITIONER

## 2025-02-05 PROCEDURE — 99999 PR PBB SHADOW E&M-EST. PATIENT-LVL IV: CPT | Mod: PBBFAC,,, | Performed by: NURSE PRACTITIONER

## 2025-02-05 PROCEDURE — 3078F DIAST BP <80 MM HG: CPT | Mod: CPTII,S$GLB,, | Performed by: NURSE PRACTITIONER

## 2025-02-05 PROCEDURE — 1160F RVW MEDS BY RX/DR IN RCRD: CPT | Mod: CPTII,S$GLB,, | Performed by: NURSE PRACTITIONER

## 2025-02-05 RX ORDER — BROMPHENIRAMINE MALEATE, PSEUDOEPHEDRINE HYDROCHLORIDE, AND DEXTROMETHORPHAN HYDROBROMIDE 2; 30; 10 MG/5ML; MG/5ML; MG/5ML
10 SYRUP ORAL EVERY 4 HOURS PRN
Qty: 240 ML | Refills: 0 | Status: SHIPPED | OUTPATIENT
Start: 2025-02-05

## 2025-02-05 RX ORDER — DICLOFENAC SODIUM 75 MG/1
75 TABLET, DELAYED RELEASE ORAL 2 TIMES DAILY
Qty: 28 TABLET | Refills: 0 | Status: SHIPPED | OUTPATIENT
Start: 2025-02-05 | End: 2025-02-19

## 2025-02-05 RX ORDER — OLOPATADINE HYDROCHLORIDE 1 MG/ML
1 SOLUTION/ DROPS OPHTHALMIC 2 TIMES DAILY
Qty: 5 ML | Refills: 0 | Status: SHIPPED | OUTPATIENT
Start: 2025-02-05 | End: 2026-02-05

## 2025-02-05 RX ORDER — FLUTICASONE PROPIONATE 50 MCG
2 SPRAY, SUSPENSION (ML) NASAL DAILY
Qty: 16 G | Refills: 1 | Status: SHIPPED | OUTPATIENT
Start: 2025-02-05 | End: 2025-02-17 | Stop reason: SDUPTHER

## 2025-02-05 NOTE — PROGRESS NOTES
Subjective:       Patient ID: Cristina Curiel is a 29 y.o. female.    Chief Complaint: Allergies (Patient report waking up yesterday morning with right eye swollen and having puffiness under both eyes- sneezing/runny nose/ear pain) and Joint Swelling        HPI WITH ASSESSMENT AND PLAN OF CARE:        History of Present Illness      CHIEF COMPLAINT:  Cristina presents today for allergy symptoms and right knee pain.    ALLERGIC RHINITIS:  She reports experiencing allergy symptoms for over a week, including runny nose, coughing, and sneezing. Eye irritation and itching began on Sunday, progressing to right eye crusting shut on Tuesday morning. She has been using Flonase nasal spray and Allegra 180mg for symptom relief. She also reports right ear ache with a plugged sensation.    MUSCULOSKELETAL - RIGHT KNEE:  She reports right knee hyperextension injury while walking on Friday. She noticed swelling yesterday, which improved with ice pack application. She rates the pain as 0/10, describing mild discomfort localized above the kneecap and behind the knee.      ROS:  Eyes: +eye discharge  ENT: +nasal congestion  Musculoskeletal: +joint pain, +joint swelling  Integumentary: +itching  Allergic: +frequent sneezing       Physical Exam    Eyes: Very minimally injected eyes.  HENT: Turbinates boggy. Turbinates pale. Positive post nasal drip.  Ears: Bilateral TMs clear. Bilateral EACs clear.  Cardiovascular: Regular rate. Regular rhythm. No murmurs. No rubs. No gallops. Normal S1, S2.  Respiratory: Normal respiratory effort. Clear to auscultation bilaterally. No rales. No rhonchi. No wheezing.  MSK: Knee - Right: Right knee is very mildly swollen. No bulge sign in right knee.                    Assessment & Plan      ALLERGIC RHINITIS:  - Prescribed Bromfet DM every 4 hours as needed for allergy symptoms (rhinorrhea, sneezing).  - Continued Flonase nasal spray, 2 sprays in each nostril daily.  - Discontinued Allegra due to  "antihistamine component in Bromfet DM.      ALLERGIC CONJUNCTIVITIS:  - Observed very minimal injection in both eyes, indicating mild conjunctivitis.  - Explained allergic conjunctivitis as the cause of eye symptoms.  - Prescribed Pataday eye drops, to be applied as directed for allergic conjunctivitis.  - Prescribed Bromfet DM every 4 hours as needed for allergy symptoms (ocular pruritus).    RIGHT KNEE STRAIN:  - Assessed right knee pain as likely strain due to minimal swelling and unremarkable exam.  - Performed physical exam revealing very mild swelling of the right knee, particularly above the patella.  - No bulge sign present.  - Diagnosed right knee strain.  - Instructed the patient to apply ice to right knee as directed.  - Prescribed diclofenac 75mg twice daily for 2 weeks for right knee strain.              Vitals:    02/05/25 0831   BP: 110/72   BP Location: Left arm   Patient Position: Sitting   Pulse: 89   Temp: 98.1 °F (36.7 °C)   TempSrc: Temporal   SpO2: 99%   Weight: 61 kg (134 lb 9.5 oz)   Height: 5' 2" (1.575 m)         Diagnoses this Encounter:         ICD-10-CM ICD-9-CM   1. Seasonal allergic rhinitis, unspecified trigger  J30.2 477.9   2. Allergic conjunctivitis of both eyes  H10.13 372.14   3. Knee strain, right, initial encounter  S86.911A 844.9   4. Encounter for blood test for routine general physical examination  Z00.00 V72.62   5. Screening for deficiency anemia  Z13.0 V78.1   6. Thyroid disorder screen  Z13.29 V77.0   7. Screening cholesterol level  Z13.220 V77.91   8. Diabetes mellitus screening  Z13.1 V77.1       Orders Placed This Encounter    CBC Auto Differential    Comprehensive Metabolic Panel    Hemoglobin A1C    Lipid Panel    TSH    brompheniramine-pseudoeph-DM (BROMFED DM) 2-30-10 mg/5 mL Syrp    fluticasone propionate (FLONASE) 50 mcg/actuation nasal spray    olopatadine (PATANOL) 0.1 % ophthalmic solution    diclofenac (VOLTAREN) 75 MG EC tablet        Follow up if symptoms " worsen or fail to improve, for 1 month for fasting labs and WELLNESS.     Patient's Medications   New Prescriptions    BROMPHENIRAMINE-PSEUDOEPH-DM (BROMFED DM) 2-30-10 MG/5 ML SYRP    Take 10 mLs by mouth every 4 (four) hours as needed (allergies/congestion).    DICLOFENAC (VOLTAREN) 75 MG EC TABLET    Take 1 tablet (75 mg total) by mouth 2 (two) times daily. for 14 days    FLUTICASONE PROPIONATE (FLONASE) 50 MCG/ACTUATION NASAL SPRAY    2 sprays (100 mcg total) by Each Nostril route once daily.    OLOPATADINE (PATANOL) 0.1 % OPHTHALMIC SOLUTION    Place 1 drop into both eyes 2 (two) times daily.   Previous Medications    DIPHENHYDRAMINE (BENADRYL) 25 MG CAPSULE    Take 1 capsule (25 mg total) by mouth every 6 (six) hours as needed for Itching or Allergies.   Modified Medications    No medications on file   Discontinued Medications    HYDROXYZINE HCL (ATARAX) 25 MG TABLET    Take 1 tablet (25 mg total) by mouth every evening.    PREDNISONE (DELTASONE) 20 MG TABLET    Take 1 tablet (20 mg total) by mouth 2 (two) times daily.                    This note was generated with the assistance of ambient listening technology. Verbal consent was obtained by the patient and accompanying visitor(s) for the recording of patient appointment to facilitate this note. I attest to having reviewed and edited the generated note for accuracy, though some syntax or spelling errors may persist. Please contact the author of this note for any clarification.

## 2025-02-17 DIAGNOSIS — J30.2 SEASONAL ALLERGIC RHINITIS, UNSPECIFIED TRIGGER: ICD-10-CM

## 2025-02-17 RX ORDER — FLUTICASONE PROPIONATE 50 MCG
2 SPRAY, SUSPENSION (ML) NASAL DAILY
Qty: 16 G | Refills: 1 | Status: SHIPPED | OUTPATIENT
Start: 2025-02-17

## 2025-02-20 NOTE — TELEPHONE ENCOUNTER
----- Message from James Gonzalez sent at 9/5/2018 10:56 AM CDT -----  Contact: [phone]  Patient is requesting a call back from the nurse stated she misplaced her antibiotics and requesting for it to be recalled in.    Please call the patient upon request at phone number [phone].    Patient will be using   [id number] - [place] - [address]  [address] [id number]  Phone: [phone] Fax: [phone]     Physical Therapy Visit    Visit Type: Daily Treatment Note  Visit: 28  Referring Provider: Nolberto RUIZ MD  Medical Diagnosis (from order): M79.18 - Myofascial muscle pain  M54.2 - Neck pain  M47.812 - Cervical spondylosis without myelopathy     SUBJECTIVE                                                                                                               Patient is feeling much better today.  The best she has felt in weeks.  Feeling up to trying exercise today.       OBJECTIVE                                                                                                                                         Treatment     Therapeutic Exercise  UBE level 3, 3 min forward/3 min back   Shoulder flexion AROM with dowel x 10   Chin tuck x 10   Chin tuck with head lift, 2 x 5   Side lying shoulder abduction 2 x 10 - mild pain over levator scapulae   Side lying shoulder ER - discontinued due to pain       Manual Therapy   Soft tissue mobilization over bilateral levator scapulae and upper trapezius , and scalenes with trigger point release          Skilled input: verbal instruction/cues    Writer verbally educated and received verbal consent for hand placement, positioning of patient, and techniques to be performed today from patient for clothing adjustments for techniques, therapist position for techniques, hand placement and palpation for techniques and modality application as described above and how they are pertinent to the patient's plan of care.  Home Exercise Program  Attempt to try side lying shoulder abduction and chin tucks as tolerated.       ASSESSMENT                                                                                                            Patient able to tolerate some exercise today due to reduced pain today.  Addressed shoulder ROM and strength with AROM exercises, however this did increase neck pain.  Spent time at end of session addressing soft tissue mobilization to reduce  pain.  Patient left without increased pain levels.    Education:   - Results of above outlined education: Verbalizes understanding and Needs reinforcement    PLAN                                                                                                                           Suggestions for next session as indicated: Progress per plan of care, continue to attempt to progress postural strength, manage pain with soft tissue and dry needling as needed.         Therapy procedure time and total treatment time can be found documented on the Time Entry flowsheet

## 2025-02-27 ENCOUNTER — LAB VISIT (OUTPATIENT)
Dept: LAB | Facility: HOSPITAL | Age: 30
End: 2025-02-27
Attending: NURSE PRACTITIONER
Payer: COMMERCIAL

## 2025-02-27 DIAGNOSIS — Z13.29 THYROID DISORDER SCREEN: ICD-10-CM

## 2025-02-27 DIAGNOSIS — Z13.220 SCREENING CHOLESTEROL LEVEL: ICD-10-CM

## 2025-02-27 DIAGNOSIS — Z13.0 SCREENING FOR DEFICIENCY ANEMIA: ICD-10-CM

## 2025-02-27 DIAGNOSIS — Z00.00 ENCOUNTER FOR BLOOD TEST FOR ROUTINE GENERAL PHYSICAL EXAMINATION: ICD-10-CM

## 2025-02-27 DIAGNOSIS — Z13.1 DIABETES MELLITUS SCREENING: ICD-10-CM

## 2025-02-27 LAB
ALBUMIN SERPL BCP-MCNC: 4.5 G/DL (ref 3.5–5.2)
ALP SERPL-CCNC: 78 U/L (ref 38–126)
ALT SERPL W/O P-5'-P-CCNC: 22 U/L (ref 10–44)
ANION GAP SERPL CALC-SCNC: 12 MMOL/L (ref 8–16)
AST SERPL-CCNC: 32 U/L (ref 15–46)
BASOPHILS # BLD AUTO: 0.04 K/UL (ref 0–0.2)
BASOPHILS NFR BLD: 0.7 % (ref 0–1.9)
BILIRUB SERPL-MCNC: 1.1 MG/DL (ref 0.1–1)
CALCIUM SERPL-MCNC: 9.6 MG/DL (ref 8.7–10.5)
CHLORIDE SERPL-SCNC: 103 MMOL/L (ref 95–110)
CHOLEST SERPL-MCNC: 145 MG/DL (ref 120–199)
CHOLEST/HDLC SERPL: 2.5 {RATIO} (ref 2–5)
CO2 SERPL-SCNC: 23 MMOL/L (ref 23–29)
CREAT SERPL-MCNC: 0.81 MG/DL (ref 0.5–1.4)
DIFFERENTIAL METHOD BLD: ABNORMAL
EOSINOPHIL # BLD AUTO: 0.3 K/UL (ref 0–0.5)
EOSINOPHIL NFR BLD: 5.5 % (ref 0–8)
ERYTHROCYTE [DISTWIDTH] IN BLOOD BY AUTOMATED COUNT: 12.9 % (ref 11.5–14.5)
EST. GFR  (NO RACE VARIABLE): >60 ML/MIN/1.73 M^2
ESTIMATED AVG GLUCOSE: 108 MG/DL (ref 68–131)
GLUCOSE SERPL-MCNC: 82 MG/DL (ref 70–110)
HBA1C MFR BLD: 5.4 % (ref 4–5.6)
HCT VFR BLD AUTO: 39.8 % (ref 37–48.5)
HDLC SERPL-MCNC: 59 MG/DL (ref 40–75)
HDLC SERPL: 40.7 % (ref 20–50)
HGB BLD-MCNC: 12.6 G/DL (ref 12–16)
IMM GRANULOCYTES # BLD AUTO: 0.01 K/UL (ref 0–0.04)
IMM GRANULOCYTES NFR BLD AUTO: 0.2 % (ref 0–0.5)
LDLC SERPL CALC-MCNC: 77 MG/DL (ref 63–159)
LYMPHOCYTES # BLD AUTO: 2.4 K/UL (ref 1–4.8)
LYMPHOCYTES NFR BLD: 41.6 % (ref 18–48)
MCH RBC QN AUTO: 27.2 PG (ref 27–31)
MCHC RBC AUTO-ENTMCNC: 31.7 G/DL (ref 32–36)
MCV RBC AUTO: 86 FL (ref 82–98)
MONOCYTES # BLD AUTO: 0.6 K/UL (ref 0.3–1)
MONOCYTES NFR BLD: 10.4 % (ref 4–15)
NEUTROPHILS # BLD AUTO: 2.4 K/UL (ref 1.8–7.7)
NEUTROPHILS NFR BLD: 41.6 % (ref 38–73)
NONHDLC SERPL-MCNC: 86 MG/DL
NRBC BLD-RTO: 0 /100 WBC
PLATELET # BLD AUTO: 216 K/UL (ref 150–450)
PMV BLD AUTO: 11.1 FL (ref 9.2–12.9)
POTASSIUM SERPL-SCNC: 4.4 MMOL/L (ref 3.5–5.1)
PROT SERPL-MCNC: 8.5 G/DL (ref 6–8.4)
RBC # BLD AUTO: 4.63 M/UL (ref 4–5.4)
SODIUM SERPL-SCNC: 138 MMOL/L (ref 136–145)
TRIGL SERPL-MCNC: 45 MG/DL (ref 30–150)
TSH SERPL DL<=0.005 MIU/L-ACNC: 1.02 UIU/ML (ref 0.4–4)
UUN UR-MCNC: 11 MG/DL (ref 7–17)
WBC # BLD AUTO: 5.65 K/UL (ref 3.9–12.7)

## 2025-02-27 PROCEDURE — 83036 HEMOGLOBIN GLYCOSYLATED A1C: CPT | Performed by: NURSE PRACTITIONER

## 2025-02-27 PROCEDURE — 80053 COMPREHEN METABOLIC PANEL: CPT | Mod: PN | Performed by: NURSE PRACTITIONER

## 2025-02-27 PROCEDURE — 84443 ASSAY THYROID STIM HORMONE: CPT | Mod: PN | Performed by: NURSE PRACTITIONER

## 2025-02-27 PROCEDURE — 80061 LIPID PANEL: CPT | Performed by: NURSE PRACTITIONER

## 2025-02-27 PROCEDURE — 85025 COMPLETE CBC W/AUTO DIFF WBC: CPT | Mod: PN | Performed by: NURSE PRACTITIONER

## 2025-04-30 ENCOUNTER — LAB VISIT (OUTPATIENT)
Dept: LAB | Facility: HOSPITAL | Age: 30
End: 2025-04-30
Attending: INTERNAL MEDICINE
Payer: COMMERCIAL

## 2025-04-30 ENCOUNTER — OFFICE VISIT (OUTPATIENT)
Dept: ALLERGY | Facility: CLINIC | Age: 30
End: 2025-04-30
Payer: COMMERCIAL

## 2025-04-30 VITALS
BODY MASS INDEX: 25.44 KG/M2 | DIASTOLIC BLOOD PRESSURE: 74 MMHG | OXYGEN SATURATION: 99 % | HEART RATE: 80 BPM | SYSTOLIC BLOOD PRESSURE: 116 MMHG | WEIGHT: 138.25 LBS | HEIGHT: 62 IN

## 2025-04-30 DIAGNOSIS — J31.0 CHRONIC RHINITIS: ICD-10-CM

## 2025-04-30 DIAGNOSIS — H01.119 EYELID DERMATITIS, ALLERGIC/CONTACT: ICD-10-CM

## 2025-04-30 DIAGNOSIS — H01.119 EYELID DERMATITIS, ALLERGIC/CONTACT: Primary | ICD-10-CM

## 2025-04-30 LAB — IGE SERPL-ACNC: 907 IU/ML

## 2025-04-30 PROCEDURE — 1160F RVW MEDS BY RX/DR IN RCRD: CPT | Mod: CPTII,S$GLB,, | Performed by: STUDENT IN AN ORGANIZED HEALTH CARE EDUCATION/TRAINING PROGRAM

## 2025-04-30 PROCEDURE — 86003 ALLG SPEC IGE CRUDE XTRC EA: CPT | Mod: 59

## 2025-04-30 PROCEDURE — 36415 COLL VENOUS BLD VENIPUNCTURE: CPT

## 2025-04-30 PROCEDURE — 1159F MED LIST DOCD IN RCRD: CPT | Mod: CPTII,S$GLB,, | Performed by: STUDENT IN AN ORGANIZED HEALTH CARE EDUCATION/TRAINING PROGRAM

## 2025-04-30 PROCEDURE — 3008F BODY MASS INDEX DOCD: CPT | Mod: CPTII,S$GLB,, | Performed by: STUDENT IN AN ORGANIZED HEALTH CARE EDUCATION/TRAINING PROGRAM

## 2025-04-30 PROCEDURE — 3074F SYST BP LT 130 MM HG: CPT | Mod: CPTII,S$GLB,, | Performed by: STUDENT IN AN ORGANIZED HEALTH CARE EDUCATION/TRAINING PROGRAM

## 2025-04-30 PROCEDURE — 86003 ALLG SPEC IGE CRUDE XTRC EA: CPT

## 2025-04-30 PROCEDURE — 3044F HG A1C LEVEL LT 7.0%: CPT | Mod: CPTII,S$GLB,, | Performed by: STUDENT IN AN ORGANIZED HEALTH CARE EDUCATION/TRAINING PROGRAM

## 2025-04-30 PROCEDURE — 99999 PR PBB SHADOW E&M-EST. PATIENT-LVL IV: CPT | Mod: PBBFAC,,, | Performed by: STUDENT IN AN ORGANIZED HEALTH CARE EDUCATION/TRAINING PROGRAM

## 2025-04-30 PROCEDURE — 99204 OFFICE O/P NEW MOD 45 MIN: CPT | Mod: S$GLB,,, | Performed by: STUDENT IN AN ORGANIZED HEALTH CARE EDUCATION/TRAINING PROGRAM

## 2025-04-30 PROCEDURE — 3078F DIAST BP <80 MM HG: CPT | Mod: CPTII,S$GLB,, | Performed by: STUDENT IN AN ORGANIZED HEALTH CARE EDUCATION/TRAINING PROGRAM

## 2025-04-30 PROCEDURE — 82785 ASSAY OF IGE: CPT

## 2025-04-30 RX ORDER — CETIRIZINE HYDROCHLORIDE 10 MG/1
10 TABLET ORAL DAILY
COMMUNITY
Start: 2025-03-30

## 2025-04-30 RX ORDER — IPRATROPIUM BROMIDE 42 UG/1
2 SPRAY, METERED NASAL 3 TIMES DAILY
COMMUNITY
Start: 2025-03-30

## 2025-04-30 RX ORDER — AMOXICILLIN 875 MG/1
875 TABLET, FILM COATED ORAL 2 TIMES DAILY
COMMUNITY
Start: 2025-03-30

## 2025-04-30 RX ORDER — DEXTROMETHORPHAN HBR, PHENYLEPHRINE HCL, PYRILAMINE MALEATE 7.5; 5; 12.5 MG/5ML; MG/5ML; MG/5ML
1 SYRUP ORAL EVERY 6 HOURS PRN
COMMUNITY
Start: 2025-03-30

## 2025-04-30 NOTE — PROGRESS NOTES
Allergy Clinic Note  [facility]    This note was created by combination of typed  and dictation. Transcription errors are likely.  If there are any questions, please contact me.    HISTORY      Patient ID: Cristina Curiel is a 29 y.o. female.    Chief Complaint: Allergic Rhinitis , Eye Burn, and Itching      Referring Provider:  PREMA Harrell    History of Present Illness: Cristina Curiel is a 29 y.o. female who presents complaint of eyelid symptoms since fall 2024.    Related medications and other interventions  Cetirizine   Flonase   Olopatadine      04/30/2025:  At initial visit, client reported a 7 month history of eyelid dermatitis superimposed on chronic rhinitis.  She says last fall she began having redness, itching, and flaking both of her eyelids as well as some dryness, darkening and increased wrinkling of lower eyelids.  She had brief clearing during a trip to [place] but symptoms recurred shortly after returning.  She denies any eyeballs symptoms.  She is suspicious of food precipitant, but not able to identify any specific food.  Symptoms are not significantly different indoors versus outdoors.  There has been no clear seasonal pattern.  Currently she is using an over-the-counter product on her eyelid described as hypochlorous acid.  She says this works better than previous hydrocortisone.  She has not had any testing to date.  She has not had any previous allergy testing.    Client has a lifetime history of chronic rhinitis with nasal congestion, runny nose, sneezing, snorting, and throat clearing.  Symptoms are perennial and adequately controlled on cetirizine and Flonase.    Other allergic syndromes  Pineapple --> hives     Medical history      Significant past medical history: none  Active problem list reviewed  Smoking Hx:  Client  reports that she quit smoking about 16 months ago. Her smoking use included vaping with nicotine. She started smoking about 5 years ago. She has never  "used smokeless tobacco.    Meds: MAR reviewed    ENT surgery:   No  Significant family history: mother with eyelid symptoms after touching shrimp  Exposures: works as firefight.  No pets/tobacco smoke/mold     Asthma: No  Rhinitis: Yes  Venom allergy:  No  Latex allergy:  No      Review of systems   CONST: no F/C/NS, no unintentional weight changes  NEURO:  no tremor, no weakness  EYES: no discharge, no erythema  EARS: no hearing loss, no sensation of fullness  PULM:  no SOB, no wheezing, no cough  CV: no CP, no palpitations  DERM: + rashes, no skin breaks    PHYSICAL EXAM   /74 (BP Location: Left arm, Patient Position: Sitting)   Pulse 80   Ht 5' 2" (1.575 m)   Wt 62.7 kg (138 lb 3.7 oz)   SpO2 99%   BMI 25.28 kg/m²   GEN: Awake and alert, no distress  DERM: No flushing, No rashes  EYE:  No ocular discharge, No conjunctival redness,    Upper and lower eyelids very dry and slightly flaky with increased wrinkling and darkening.  HENT: No nasal discharge, no hoarseness, TMs are translucent bilaterally.  Nares are pink with moderate turbinate swelling, significantly worse on the left.  Oropharynx is benign without exudate.  Tongue is not coated.  No LAD  PULM: Normal work of breathing, no cough  NEURO:  No focal deficit, speech fluent and logical  PSYCH: appropriate affect, normal behavior    MEDICAL DECISION MAKING     Data reviewed:       (new entries in bold-face)      Allergy Testing      Ordered      Lab results         EO count 300, 2025      Imaging and other diagnostics       Medical records review   At initial visit reviewed nurse practitioner note of 02/05/2025.  Client reported wakening with right eyelid swelling in association with rhinitis.  She was prescribed olopatadine and Bromfed DM in addition to her chronic use of Flonase.  Allegra was discontinued.          Diagnoses:   Cristina Curiel is a 29 y.o. female. with  1. Eyelid dermatitis, allergic/contact    2. Chronic rhinitis  "         Assessment / Plan   Client has eyelid dermatitis that is likely related to underlying rhinitis.  It does not meet criteria for atopic keratoconjunctivitis, given the lack of eyeballs symptoms, but seems to be a similar eyelid eczema.  Diagnostically, I recommend screening for airborne allergies by the Immunocap method.  Also plan food skin testing at next visit.  Therapeutically, I recommend additional moisturizer and Vaseline applied to eyelids.   continue present medications.        Eyelid dermatitis, allergic/contact    Chronic rhinitis  -     IgE; Future; Expected date: 04/30/2025  -     Dermatophagoides Centerpoint; Future; Expected date: 04/30/2025  -     Dermatophagoides Pteronyssinus; Future; Expected date: 04/30/2025  -     Bermuda; Future; Expected date: 04/30/2025  -     Dann; Future; Expected date: 04/30/2025  -     [place]ton; Future; Expected date: 04/30/2025  -     English Plantain; Future; Expected date: 04/30/2025  -     Norman; Future; Expected date: 04/30/2025  -     Pecan; Future; Expected date: 04/30/2025  -     Ragweed; Future; Expected date: 04/30/2025  -     Alternaria; Future; Expected date: 04/30/2025  -     Aspergillus; Future; Expected date: 04/30/2025  -     Cat; Future; Expected date: 04/30/2025  -     Dog; Future; Expected date: 04/30/2025  -     Allergen Bahia Grass IgE; Future; Expected date: 04/30/2025  -     Allergen Jose Luis Grass IgE; Future; Expected date: 04/30/2025        Comorbidities  None    INSTRUCTIONS AND FOLLOW-UP     Patient Instructions     Testing  Blood work for allergy testing today       Check [facility] in one week for results or call [phone]       Contact me with questions or concerns       I will contact you if anything needs immediate attention.    Food Skin testing next visit.  No antihistamines (cetrizine, Benaryl, Theraflu, etc) for 5 days prior.      Treatment    Continue Flonase    OK to stop eyedrops    Continue cetirizine     But stop 5 days before next  visit    Follow up for skin testing.          Elana Pinto MD  Allergy, Asthma & Immunology        I spent a total of 48 minutes on the day of the visit. This includes face to face time and non-face to face time preparing to see the patient (eg, review of tests), obtaining and/or reviewing separately obtained history, documenting clinical information in the electronic or other health record, independently interpreting results and communicating results to the patient/family/caregiver, or care coordinator.  Thank you thinks

## 2025-04-30 NOTE — PATIENT INSTRUCTIONS
Testing  Blood work for allergy testing today       Check [facility] in one week for results or call [phone]       Contact me with questions or concerns       I will contact you if anything needs immediate attention.    Food Skin testing next visit.  No antihistamines (cetrizine, Benaryl, Theraflu, etc) for 5 days prior.      Treatment    Continue Flonase    OK to stop eyedrops    Continue cetirizine     But stop 5 days before next visit

## 2025-05-01 ENCOUNTER — PATIENT MESSAGE (OUTPATIENT)
Dept: FAMILY MEDICINE | Facility: CLINIC | Age: 30
End: 2025-05-01
Payer: COMMERCIAL

## 2025-05-01 NOTE — TELEPHONE ENCOUNTER
Mary - call patient to schedule wellness exam - advise we will discuss anxiety med at time of visit.

## 2025-05-05 LAB
W ALTERNARIA ALTERNATA, CLASS: ABNORMAL
W ALTERNARIA ALTERNATA, IGE: 0.5 KU/L
W ASPERGILLUS FUMIGATUS, CLASS: ABNORMAL
W ASPERGILLUS FUMIGATUS, IGE: 0.54 KU/L
W BAHIA GRASS, CLASS: ABNORMAL
W BAHIA GRASS, IGE: >100 KU/L
W BERMUDA GRASS, CLASS: ABNORMAL
W BERMUDA GRASS, IGE: 47.8 KU/L
W CAT DANDER, CLASS: ABNORMAL
W CAT DANDER, IGE: 0.62 KU/L
W CEDAR, CLASS: ABNORMAL
W CEDAR, IGE: 9.42 KU/L
W DERMATOPHAGOIDES FARINAE CLASS: ABNORMAL
W DERMATOPHAGOIDES FARINAE, IGE: 3.34 KU/L
W DERMATOPHAGOIDES PTERONYSSINUS CLASS: ABNORMAL
W DERMATOPHAGOIDES PTERONYSSINUS, IGE: 1.32 KU/L
W DOG DANDER, CLASS: ABNORMAL
W DOG DANDER, IGE: 7.33 KU/L
W ENGLISH PLANTAIN, RIBWORT, CLASS: ABNORMAL
W ENGLISH PLANTAIN, RIBWORT, IGE: 19.7 KU/L
W JOHNSON GRASS, CLASS: ABNORMAL
W JOHNSON GRASS, IGE: 80.2 KU/L
W OAK, CLASS: ABNORMAL
W OAK, IGE: 19.5 KU/L
W PECAN, HICKORY, CLASS: ABNORMAL
W PECAN, HICKORY, IGE: 30 KU/L
W TIMOTHY GRASS, CLASS: ABNORMAL
W TIMOTHY GRASS, IGE: 89.8 KU/L
W WESTERN RAGWEED, CLASS: ABNORMAL
W WESTERN RAGWEED, IGE: 17.5 KU/L

## 2025-05-17 ENCOUNTER — HOSPITAL ENCOUNTER (EMERGENCY)
Facility: HOSPITAL | Age: 30
Discharge: HOME | End: 2025-05-18
Attending: EMERGENCY MEDICINE
Payer: COMMERCIAL

## 2025-05-17 DIAGNOSIS — J02.0 STREP THROAT: Primary | ICD-10-CM

## 2025-05-17 LAB
B-HCG UR QL: NEGATIVE
CTP QC/QA: YES
GROUP A STREP MOLECULAR (OHS): POSITIVE
SARS-COV-2 RDRP RESP QL NAA+PROBE: NEGATIVE

## 2025-05-17 PROCEDURE — 87651 STREP A DNA AMP PROBE: CPT | Mod: ER | Performed by: EMERGENCY MEDICINE

## 2025-05-17 PROCEDURE — 81025 URINE PREGNANCY TEST: CPT | Mod: ER | Performed by: EMERGENCY MEDICINE

## 2025-05-17 PROCEDURE — U0002 COVID-19 LAB TEST NON-CDC: HCPCS | Mod: ER | Performed by: EMERGENCY MEDICINE

## 2025-05-17 PROCEDURE — 99284 EMERGENCY DEPT VISIT MOD MDM: CPT | Mod: ER

## 2025-05-17 PROCEDURE — 25000003 PHARM REV CODE 250: Mod: ER | Performed by: EMERGENCY MEDICINE

## 2025-05-17 RX ORDER — ONDANSETRON 4 MG/1
4 TABLET, ORALLY DISINTEGRATING ORAL
Status: COMPLETED | OUTPATIENT
Start: 2025-05-17 | End: 2025-05-17

## 2025-05-17 RX ORDER — KETOROLAC TROMETHAMINE 10 MG/1
10 TABLET, FILM COATED ORAL
Status: COMPLETED | OUTPATIENT
Start: 2025-05-17 | End: 2025-05-17

## 2025-05-17 RX ORDER — AMOXICILLIN AND CLAVULANATE POTASSIUM 875; 125 MG/1; MG/1
1 TABLET, FILM COATED ORAL
Status: COMPLETED | OUTPATIENT
Start: 2025-05-17 | End: 2025-05-17

## 2025-05-17 RX ORDER — AMOXICILLIN 500 MG/1
500 CAPSULE ORAL 3 TIMES DAILY
Qty: 21 CAPSULE | Refills: 0 | Status: SHIPPED | OUTPATIENT
Start: 2025-05-17 | End: 2025-05-24

## 2025-05-17 RX ORDER — DICLOFENAC SODIUM 75 MG/1
75 TABLET, DELAYED RELEASE ORAL 2 TIMES DAILY
Qty: 60 TABLET | Refills: 0 | Status: SHIPPED | OUTPATIENT
Start: 2025-05-17

## 2025-05-17 RX ADMIN — AMOXICILLIN AND CLAVULANATE POTASSIUM 1 TABLET: 875; 125 TABLET, FILM COATED ORAL at 11:05

## 2025-05-17 RX ADMIN — ONDANSETRON 4 MG: 4 TABLET, ORALLY DISINTEGRATING ORAL at 11:05

## 2025-05-17 RX ADMIN — KETOROLAC TROMETHAMINE 10 MG: 10 TABLET, FILM COATED ORAL at 11:05

## 2025-05-17 NOTE — Clinical Note
"Cristina Mendezalpesh Curiel was seen and treated in our emergency department on 5/17/2025.  She may return to work on 05/19/2025.       If you have any questions or concerns, please don't hesitate to call.      DORENE Sheldon RN RN    "

## 2025-05-18 VITALS
TEMPERATURE: 98 F | OXYGEN SATURATION: 98 % | HEIGHT: 62 IN | HEART RATE: 76 BPM | SYSTOLIC BLOOD PRESSURE: 118 MMHG | DIASTOLIC BLOOD PRESSURE: 76 MMHG | BODY MASS INDEX: 24.84 KG/M2 | WEIGHT: 135 LBS | RESPIRATION RATE: 18 BRPM

## 2025-05-18 NOTE — ED PROVIDER NOTES
Encounter Date: 5/17/2025       History     Chief Complaint   Patient presents with    Nausea     N/V, sore throat, ear pain and chills 12 hours.      HPI  29 y.o.  St, otalgia, nausea x 12 hrs  No sick contacts    Review of patient's allergies indicates:   Allergen Reactions    Pineapple Hives    Zithromax [azithromycin]      Past Medical History:   Diagnosis Date    Anxiety 02/26/2024    Reports she has been out of work since November 2023 for rotator cuff injury  She should return to work at end of March  She reports she has increased anxiety, mostly at night - states she has palpitation, overthinking, anxious about being out of work.  She also report social anxiety around large crowds such as during [place].  She states anxiety episodes around once a week.  Started on Buspar     Environmental and seasonal allergies 04/02/2024    Complains of Runny nose/sneezing/itchy eyes  Not controlled on Claritin  Will treat with Bromfed DM and Flonase nasal spray  If no improvement in a week - come into office for evaluation.       Past Surgical History:   Procedure Laterality Date    ANTERIOR CRUCIATE LIGAMENT REPAIR      HERNIA REPAIR       Family History   Problem Relation Name Age of Onset    Diabetes Mother shireen     No Known Problems Half-sister Mary Beth     Breast cancer Neg Hx      Ovarian cancer Neg Hx      Colon cancer Neg Hx       Social History[1]  Review of Systems  All systems were reviewed/examined and were negative except as noted in the HPI.    Physical Exam     Initial Vitals [05/17/25 2253]   BP Pulse Resp Temp SpO2   124/81 110 18 100.2 °F (37.9 °C) 98 %      MAP       --         Physical Exam    General: the patient is awake, alert, and in no apparent distress.  Head: normocephalic and atraumatic, sclera are clear  TMs clear  OP mild erythema no signs of PTA  Neck: supple without meningismus  Chest: no respiratory distress  Heart: regular rate and rhythm  Extremities: warm and well perfused  Skin:  warm and dry  Psych conversant  Neuro: awake, alert, moving all extremities    ED Course   Procedures  Labs Reviewed   GROUP A STREP, MOLECULAR - Abnormal       Result Value    Group A Strep Molecular Positive (*)     Narrative:     Arcanobacterium haemolyticum and Beta Streptococcus group C and G will not be detected by this test method.  Please order Throat Culture ([id number]) if suspected.       SARS-COV-2 RNA AMPLIFICATION, QUAL - Normal    SARS COV-2 Molecular Negative     POCT URINE PREGNANCY    POC Preg Test, Ur Negative       Acceptable Yes            Imaging Results    None          Medications   ondansetron disintegrating tablet 4 mg (4 mg Oral Given 5/17/25 2302)   amoxicillin-clavulanate 875-125mg per tablet 1 tablet (1 tablet Oral Given 5/17/25 2332)   ketorolac tablet 10 mg (10 mg Oral Given 5/17/25 2332)     Medical Decision Making  Amount and/or Complexity of Data Reviewed  Labs: ordered.    Risk  Prescription drug management.       Medical Decision Making:    This is an emergent evaluation of a patient presenting to the ED.  Nursing notes were reviewed.  Strep +  I personally reviewed and interpreted the laboratory results.    I decided to obtain and review old medical records, which showed: outpatient care    Evaluation for Emergency Medical Condition  The patient received a medical screening exam and within a reasonable degree of clinical confidence an emergency medical condition has not been identified.  The patient is instructed on proper follow up and return precautions to the ED.    Sx tx  Abx      Iain Arora MD, SAUL                                 Clinical Impression:  Final diagnoses:  [J02.0] Strep throat (Primary)          ED Disposition Condition    Discharge Stable          ED Prescriptions       Medication Sig Dispense Start Date End Date Auth. Provider    amoxicillin (AMOXIL) 500 MG capsule Take 1 capsule (500 mg total) by mouth 3 (three) times daily. for 7 days 21 capsule  2025 Marko Arora MD    diclofenac (VOLTAREN) 75 MG EC tablet Take 1 tablet (75 mg total) by mouth 2 (two) times daily. 60 tablet 2025 -- Marko Arora MD          Follow-up Information       Follow up With Specialties Details Why Contact Info    Minerva Harrell, NP Family Medicine Schedule an appointment as soon as possible for a visit   [id number] [place] [address]  [address]  [facility] [address]  [phone]            Discharged to home in stable condition, return to ED warnings given, follow up and patient care instructions given.      Iain Arora MD, SAUL, FACEP  Department of Emergency Medicine           [1]   Social History  Tobacco Use    Smoking status: Former     Types: Vaping with nicotine     Start date: 2020     Quit date: 2023     Years since quittin.4    Smokeless tobacco: Never   Substance Use Topics    Alcohol use: Yes     Alcohol/week: 1.0 standard drink of alcohol     Types: 1 Drinks containing 0.5 oz of alcohol per week     Comment: occas    Drug use: Not Currently     Types: Marijuana     Comment: daily-        Marko Arora MD  [phone]

## 2025-05-20 ENCOUNTER — TELEPHONE (OUTPATIENT)
Dept: FAMILY MEDICINE | Facility: CLINIC | Age: 30
End: 2025-05-20
Payer: COMMERCIAL

## 2025-05-20 NOTE — TELEPHONE ENCOUNTER
----- Message from Preethi sent at 5/16/2025  9:08 AM CDT -----  Type:  Needs Medical AdviceWho Called: PatientWould the patient rather a call back or a response via [facility]? callBest Call Back Number: [phone] Information: Pt is requesting a call from Nurse in office regarding a copy of her Immunization Records pt was able to see in my chart But will need a paper copy will  from office please call regarding the list that is needed

## 2025-05-20 NOTE — TELEPHONE ENCOUNTER
Spoke with patient in regards to message, patient inform that that she was able to locate the shots she needed in her portal- she no longer need a copy of her shot records.

## 2025-05-28 ENCOUNTER — TELEPHONE (OUTPATIENT)
Dept: FAMILY MEDICINE | Facility: CLINIC | Age: 30
End: 2025-05-28
Payer: COMMERCIAL

## 2025-05-28 NOTE — TELEPHONE ENCOUNTER
----- Message from Rebecca sent at 5/27/2025 10:57 AM CDT -----  Regarding: TB Reading  Ms. Curiel stated she can come for appt, but will not be able to come in for the TB skin reading on June 9th due to her having class that day. Ms. Curiel would like to can she come another day or due the reading virtual instead?  ----- Message -----  From: Karrie Cabrales  Sent: 5/27/2025  10:44 AM CDT  To: Julio Cesar Palma Staff    Type: General Call Back Name of Caller:PtSymptoms:Would the patient rather a call back or a response via [facility]? Call [facility] Call Back Number:[phone] Additional Information: Pt want to know if she can come in sooner than the appt just for a TB test to read at the upcoming appt

## 2025-05-28 NOTE — TELEPHONE ENCOUNTER
Spoke with patient in regards to message, I advised patient she will need go to an urgent care to have her TB skin done.

## 2025-06-03 ENCOUNTER — OCCUPATIONAL HEALTH (OUTPATIENT)
Dept: URGENT CARE | Facility: CLINIC | Age: 30
End: 2025-06-03

## 2025-06-03 DIAGNOSIS — Z00.00 ENCOUNTER FOR PHYSICAL EXAMINATION: Primary | ICD-10-CM

## 2025-06-05 ENCOUNTER — OCCUPATIONAL HEALTH (OUTPATIENT)
Dept: URGENT CARE | Facility: CLINIC | Age: 30
End: 2025-06-05

## 2025-06-05 DIAGNOSIS — Z13.9 ENCOUNTER FOR SCREENING: Primary | ICD-10-CM

## 2025-06-05 LAB
TB INDURATION 48 - 72 HR READ: 0 MM
TB SKIN TEST 48 - 72 HR READ: NEGATIVE

## 2025-06-09 ENCOUNTER — OFFICE VISIT (OUTPATIENT)
Dept: FAMILY MEDICINE | Facility: CLINIC | Age: 30
End: 2025-06-09
Payer: COMMERCIAL

## 2025-06-09 VITALS
OXYGEN SATURATION: 99 % | DIASTOLIC BLOOD PRESSURE: 60 MMHG | BODY MASS INDEX: 25.76 KG/M2 | SYSTOLIC BLOOD PRESSURE: 116 MMHG | HEIGHT: 62 IN | HEART RATE: 94 BPM | WEIGHT: 140 LBS | TEMPERATURE: 99 F

## 2025-06-09 DIAGNOSIS — F41.9 ANXIETY: ICD-10-CM

## 2025-06-09 DIAGNOSIS — R41.840 POOR CONCENTRATION: ICD-10-CM

## 2025-06-09 DIAGNOSIS — J30.89 ENVIRONMENTAL AND SEASONAL ALLERGIES: ICD-10-CM

## 2025-06-09 DIAGNOSIS — Z00.00 ANNUAL PHYSICAL EXAM: Primary | ICD-10-CM

## 2025-06-09 PROCEDURE — 99999 PR PBB SHADOW E&M-EST. PATIENT-LVL III: CPT | Mod: PBBFAC,,, | Performed by: NURSE PRACTITIONER

## 2025-06-09 RX ORDER — HYDROXYZINE HYDROCHLORIDE 25 MG/1
25 TABLET, FILM COATED ORAL 3 TIMES DAILY PRN
Qty: 30 TABLET | Refills: 2 | Status: SHIPPED | OUTPATIENT
Start: 2025-06-09

## 2025-06-09 RX ORDER — BUPROPION HYDROCHLORIDE 150 MG/1
150 TABLET ORAL DAILY
Qty: 30 TABLET | Refills: 1 | Status: SHIPPED | OUTPATIENT
Start: 2025-06-09 | End: 2026-06-09

## 2025-06-09 NOTE — PROGRESS NOTES
Subjective:       Patient ID: Cristina Curiel is a 29 y.o. female.    Chief Complaint: Annual Exam, Anxiety, Medicare AWV Follow Up, and problems focusing        HPI WITH ASSESSMENT AND PLAN OF CARE:      Patient is a 28 year old black female with Anxiety and Seasonal Allergies that is here today for ANNUAL physical exam with fasting lab results.         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  Took Buspar a few times and did okay  States she was diagnosed with ADHD as a child - was put on Vyvanse but caused a zombie effect during day and weight loss.  Reports she does have trouble focusing at times, remembering things, etc.  Discussed in great detail - the anxiety could be affecting her focus and attention span.  Lets trial Wellbutrin XL as it can treat anxiety and ADHD symptoms  Start Wellbutrin  mg daily and recheck in 8 weeks  If not effective - can refer to psychiatry for ADHD evaluation and treatment.       Environmental and Seasonal Allergies  See list of allergies below  Will prescribe Hydroxzyine 25 mg at bedtime as it will treat allergies as well as night time anxiety  Follow up in 8 weeks.      Wellness Labs:  CBC okay  CMP okay  Cholesterol levels are excellent  TSH WNL  HgbA1C 5.4%     Health Maintenance:  Declined covid vaccines  Pap Smear up to date          Occupational Health on 06/03/2025   Component Date Value Ref Range Status    TB Induration 48 - 72 hr read 06/05/2025 0  mm Final    TB Skin Test 48 - 72 hr read 06/05/2025 Negative   Final   Admission on 05/17/2025, Discharged on 05/18/2025   Component Date Value Ref Range Status    POC Preg Test, Ur 05/17/2025 Negative  Negative Final     Acceptable 05/17/2025 Yes   Final    SARS COV-2 Molecular 05/17/2025 Negative  Negative Final    This test utilizes isothermal nucleic acid amplification technology to detect the  SARS-CoV-2 RdRp nucleic acid segment. The analytical sensitivity (limit of detection) is 500 copies/swab.     A POSITIVE result is indicative of the presence of SARS-CoV-2 RNA; clinical correlation with patient history and other diagnostic information is necessary to determine patient infection status.    A NEGATIVE result means that SARS-CoV-2 nucleic acids are not present above the limit of detection. A NEGATIVE result should be treated as presumptive. It does not rule out the possibility of COVID-19 and should not be the sole basis for treatment decisions.    This test is Food and Drug Administration (FDA) approved.  Performance characteristics of this test has been independently verified by [facility] Department of Pathology and Laboratory Medicine.    Group A Strep Molecular 05/17/2025 Positive (A)  Negative Final   Lab Visit on 04/30/2025   Component Date Value Ref Range Status    Total IgE 04/30/2025 907  IU/mL Final    Dermatophagoides farinae, IgE 04/30/2025 3.34 (H)  <0.10 kU/L Final    Dermatophagoides farinae Class 04/30/2025 CLASS 2   Final       Test performed at [facility],  [address] [place]  [address]     [phone]  Sloane Loco MD, PhD - Medical Director    Dermatophagoides pteronyssinus, IgE 04/30/2025 1.32 (H)  <0.10 kU/L Final    Dermatophagoides pteronyssinus Cla* 04/30/2025 CLASS 2   Final       Test performed at [facility],  [address] [place]  [address] [phone]  Sloane Loco MD, PhD - Medical Director    Berm[place] Grass, IgE 04/30/2025 47.80 (H)  <0.10 kU/L Final    [place] Grass, Class 04/30/2025 CLASS 4   Final       Test performed at [facility],  [address] [place]  [address] [phone]  Sloane Loco MD, PhD - Medical Director    Dann Grass, IgE 04/30/2025 89.80 (H)  <0.10 kU/L Final    Dann Grass, Class 04/30/2025 CLASS 5   Final       Test performed at [facility],  [address], [place]  [address]     [phone]  Sloane Loco MD, PhD - Medical Director    [place], IgE 04/30/2025 9.42 (H)  <0.10 kU/L Final    [place], Class 04/30/2025 CLASS 3   Final       Test performed at [facility],  [address] [place]  [address]     [phone]  Sloane Loco MD, PhD - Medical Director    Maltese Plantain Ribwort, IgE 04/30/2025 19.70 (H)  <0.10 kU/L Final    English Plantain [facility], Class 04/30/2025 CLASS 4   Final       Test performed at [facility],  [address] [place] [address]     [phone]  Sloane Loco MD, PhD - Medical Director    [place], IgE 04/30/2025 19.50 (H)  <0.10 kU/L Final    [place], Class 04/30/2025 CLASS 4   Final       Test performed at [facility],  [address] [place]  [address]     [phone]  Sloane Loco MD, PhD - Medical Director    Lauren Espinoza, IgE 04/30/2025 30.00 (H)  <0.10 kU/L Final    Lauren Espinoza, Class 04/30/2025 CLASS 4   Final       Test performed at [facility],  [address] [place]  [address]     [phone]  Sloane Loco MD, PhD - Medical Director    Western Ragweed, IgE 04/30/2025 17.50 (H)  <0.10 kU/L Final    Western Ragweed, Class 04/30/2025 CLASS 4   Final       Test performed at [facility],  [address] [place]  [address]     [phone]  Sloane Loco MD, PhD - Medical Director    Alternaria alternata, Class 04/30/2025 CLASS 1   Final       Test performed at [facility],  [address] [place]  [address]     [phone]  Sloane Loco MD, PhD - Medical Director    Alternaria alternata, IgE 04/30/2025 0.50 (H)  <0.10 kU/L Final    Aspergillus fumigatus, IgE 04/30/2025 0.54 (H)  <0.10 kU/L Final    Aspergillus fumigatus, Class 04/30/2025 CLASS 1   Final       Test performed at [facility],  [address], [place] [id number]      [phone]  Sloane Loco MD, PhD - Medical Director    Cat Dander, IgE 04/30/2025 0.62 (H)  <0.10 kU/L Final    Cat Dander, Class 04/30/2025 CLASS 1   Final       Test performed at [facility],  [address] [place]  [address]     [phone]  Sloane Loco MD, PhD - Medical Director    Dog Dander, IgE 04/30/2025 7.33 (H)  <0.10 kU/L Final    Dog Dander, Class 04/30/2025 CLASS 3   Final       Test performed at [facility],  [address] [place], [place]  [id number] [phone]  Sloane Loco MD, PhD - Medical Director    Omega Costello, IgE 04/30/2025 >100.00 (H)  <0.10 kU/L Final    Omega Grass, Class 04/30/2025 CLASS 6   Final       Test performed at [facility],  [address] [place]  [address]     [phone]  Sloane Loco MD, PhD - Medical Director    Jose Luis Costello, IgE 04/30/2025 80.20 (H)  <0.10 kU/L Final    Jose Luis Costello, Class 04/30/2025 CLASS 5   Final       Test performed at [facility],  [address] [place], [place]  [id number] [phone]  Sloane Loco MD, PhD - Medical Director   Lab Visit on 02/27/2025   Component Date Value Ref Range Status    WBC 02/27/2025 5.65  3.90 - 12.70 K/uL Final    RBC 02/27/2025 4.63  4.00 - 5.40 M/uL Final    Hemoglobin 02/27/2025 12.6  12.0 - 16.0 g/dL Final    Hematocrit 02/27/2025 39.8  37.0 - 48.5 % Final    MCV 02/27/2025 86  82 - 98 fL Final    MCH 02/27/2025 27.2  27.0 - 31.0 pg Final    MCHC 02/27/2025 31.7 (L)  32.0 - 36.0 g/dL Final    RDW 02/27/2025 12.9  11.5 - 14.5 % Final    Platelets 02/27/2025 216  150 - 450 K/uL Final    MPV 02/27/2025 11.1  9.2 - 12.9 fL Final    Immature Granulocytes 02/27/2025 0.2  0.0 - 0.5 % Final    Gran # (ANC) 02/27/2025 2.4  1.8 - 7.7 K/uL Final    Immature Grans (Abs) 02/27/2025 0.01  0.00 - 0.04 K/uL Final    Comment: Mild elevation in immature granulocytes is non specific and   can be seen in a variety of conditions including  stress response,   acute inflammation, trauma and pregnancy. Correlation with other   laboratory and clinical findings is essential.      Lymph # 02/27/2025 2.4  1.0 - 4.8 K/uL Final    Mono # 02/27/2025 0.6  0.3 - 1.0 K/uL Final    Eos # 02/27/2025 0.3  0.0 - 0.5 K/uL Final    Baso # 02/27/2025 0.04  0.00 - 0.20 K/uL Final    nRBC 02/27/2025 0  0 /100 WBC Final    Gran % 02/27/2025 41.6  38.0 - 73.0 % Final    Lymph % 02/27/2025 41.6  18.0 - 48.0 % Final    Mono % 02/27/2025 10.4  4.0 - 15.0 % Final    Eosinophil % 02/27/2025 5.5  0.0 - 8.0 % Final    Basophil % 02/27/2025 0.7  0.0 - 1.9 % Final    Differential Method 02/27/2025 Automated   Final    Sodium 02/27/2025 138  136 - 145 mmol/L Final    Potassium 02/27/2025 4.4  3.5 - 5.1 mmol/L Final    Chloride 02/27/2025 103  95 - 110 mmol/L Final    CO2 02/27/2025 23  23 - 29 mmol/L Final    Glucose 02/27/2025 82  70 - 110 mg/dL Final    BUN 02/27/2025 11  7 - 17 mg/dL Final    Creatinine 02/27/2025 0.81  0.50 - 1.40 mg/dL Final    Calcium 02/27/2025 9.6  8.7 - 10.5 mg/dL Final    Total Protein 02/27/2025 8.5 (H)  6.0 - 8.4 g/dL Final    Albumin 02/27/2025 4.5  3.5 - 5.2 g/dL Final    Total Bilirubin 02/27/2025 1.1 (H)  0.1 - 1.0 mg/dL Final    Comment: For infants and newborns, interpretation of results should be based  on gestational age, weight and in agreement with clinical  observations.    Premature Infant recommended reference ranges:  Up to 24 hours.............<8.0 mg/dL  Up to 48 hours............<12.0 mg/dL  3-5 days..................<15.0 mg/dL  6-29 days.................<15.0 mg/dL      Alkaline Phosphatase 02/27/2025 78  38 - 126 U/L Final    AST 02/27/2025 32  15 - 46 U/L Final    ALT 02/27/2025 22  10 - 44 U/L Final    Anion Gap 02/27/2025 12  8 - 16 mmol/L Final    eGFR 02/27/2025 >60.0  >60 mL/min/1.73 m^2 Final    Hemoglobin A1C 02/27/2025 5.4  4.0 - 5.6 % Final    Comment: ADA Screening Guidelines:  5.7-6.4%  Consistent with prediabetes  >or=6.5%   Consistent with diabetes    High levels of fetal hemoglobin interfere with the HbA1C  assay. Heterozygous hemoglobin variants (HbS, HgC, etc)do  not significantly interfere with this assay.   However, presence of multiple variants may affect accuracy.      Estimated Avg Glucose 02/27/2025 108  68 - 131 mg/dL Final    Cholesterol 02/27/2025 145  120 - 199 mg/dL Final    Comment: The National Cholesterol Education Program (NCEP) has set the  following guidelines (reference ranges) for Cholesterol:  Optimal.....................<200 mg/dL  Borderline High.............200-239 mg/dL  High........................> or = 240 mg/dL      Triglycerides 02/27/2025 45  30 - 150 mg/dL Final    Comment: The National Cholesterol Education Program (NCEP) has set the  following guidelines (reference values) for triglycerides:  Normal......................<150 mg/dL  Borderline High.............150-199 mg/dL  High........................200-499 mg/dL      HDL 02/27/2025 59  40 - 75 mg/dL Final    Comment: The National Cholesterol Education Program (NCEP) has set the  following guidelines (reference values) for HDL Cholesterol:  Low...............<40 mg/dL  Optimal...........>60 mg/dL      LDL Cholesterol 02/27/2025 77.0  63.0 - 159.0 mg/dL Final    Comment: The National Cholesterol Education Program (NCEP) has set the  following guidelines (reference values) for LDL Cholesterol:  Optimal.......................<130 mg/dL  Borderline High...............130-159 mg/dL  High..........................160-189 mg/dL  Very High.....................>190 mg/dL      HDL/Cholesterol Ratio 02/27/2025 40.7  20.0 - 50.0 % Final    Total Cholesterol/HDL Ratio 02/27/2025 2.5  2.0 - 5.0 Final    Non-HDL Cholesterol 02/27/2025 86  mg/dL Final    Comment: Risk category and Non-HDL cholesterol goals:  Coronary heart disease (CHD)or equivalent (10-year risk of CHD >20%):  Non-HDL cholesterol goal     <130 mg/dL  Two or more CHD risk factors and 10-year risk  "of CHD <= 20%:  Non-HDL cholesterol goal     <160 mg/dL  0 to 1 CHD risk factor:  Non-HDL cholesterol goal     <190 mg/dL      TSH 02/27/2025 1.020  0.400 - 4.000 uIU/mL Final    Comment: Warning:  Heterophilic antibodies in serum or plasma of   certain individuals are known to cause interference with   immunoassays. These antibodies may be present in blood samples   from individuals regularly exposed to animal or who have been   treated with animal products.     Patients taking high doses of supplemental biotin may have  negatively biased results.              Vitals:    06/09/25 1359   BP: 116/60   BP Location: Right arm   Patient Position: Sitting   Pulse: 94   Temp: 99 °F (37.2 °C)   TempSrc: Temporal   SpO2: 99%   Weight: 63.5 kg (139 lb 15.9 oz)   Height: 5' 2" (1.575 m)         Diagnoses this Encounter:         ICD-10-CM ICD-9-CM   1. Annual physical exam  Z00.00 V70.0   2. Anxiety  F41.9 300.00   3. Environmental and seasonal allergies  J30.89 477.8   4. Poor concentration  R41.840 799.51       Orders Placed This Encounter    buPROPion (WELLBUTRIN XL) 150 MG TB24 tablet    hydrOXYzine HCL (ATARAX) 25 MG tablet        Follow up in about 7 weeks (around 7/28/2025) for medication follow up.     Patient's Medications   New Prescriptions    BUPROPION (WELLBUTRIN XL) 150 MG TB24 TABLET    Take 1 tablet (150 mg total) by mouth once daily.    HYDROXYZINE HCL (ATARAX) 25 MG TABLET    Take 1 tablet (25 mg total) by mouth 3 (three) times daily as needed for Itching or Anxiety.   Previous Medications    No medications on file   Modified Medications    No medications on file   Discontinued Medications    AMOXICILLIN (AMOXIL) 875 MG TABLET    Take 875 mg by mouth 2 (two) times daily.    BROMPHENIRAMINE-PSEUDOEPH-DM (BROMFED DM) 2-30-10 MG/5 ML SYRP    Take 10 mLs by mouth every 4 (four) hours as needed (allergies/congestion).    CETIRIZINE (ZYRTEC) 10 MG TABLET    Take 10 mg by mouth once daily.    DICLOFENAC (VOLTAREN) " 75 MG EC TABLET    Take 1 tablet (75 mg total) by mouth 2 (two) times daily.    DIPHENHYDRAMINE (BENADRYL) 25 MG CAPSULE    Take 1 capsule (25 mg total) by mouth every 6 (six) hours as needed for Itching or Allergies.    FLUTICASONE PROPIONATE (FLONASE) 50 MCG/ACTUATION NASAL SPRAY    2 sprays (100 mcg total) by Each Nostril route once daily.    IPRATROPIUM (ATROVENT) 42 MCG (0.06 %) NASAL SPRAY    2 sprays by Each Nostril route 3 (three) times daily.    OLOPATADINE (PATANOL) 0.1 % OPHTHALMIC SOLUTION    Place 1 drop into both eyes 2 (two) times daily.    POLYTUSSIN DM,PYRILAMINE, 12.5-5-7.5 MG/5 ML LIQD    Take 1 tablet by mouth every 6 (six) hours as needed.         Review of Systems   HENT:  Positive for congestion and sinus pressure.    Allergic/Immunologic: Positive for environmental allergies.   Psychiatric/Behavioral:  Positive for decreased concentration and sleep disturbance. The patient is nervous/anxious.          Objective:        Physical Exam  Constitutional:       General: She is not in acute distress.     Appearance: Normal appearance. She is normal weight. She is not toxic-appearing or diaphoretic.      Comments: Body mass index is 22.12 kg/m².     HENT:      Head: Normocephalic and atraumatic.      Right Ear: Tympanic membrane, ear canal and external ear normal.      Left Ear: Tympanic membrane, ear canal and external ear normal.      Nose: No congestion.      Mouth/Throat:      Pharynx: No posterior oropharyngeal erythema.   Eyes:      Extraocular Movements: Extraocular movements intact.      Conjunctiva/sclera: Conjunctivae normal.   Cardiovascular:      Rate and Rhythm: Normal rate and regular rhythm.      Heart sounds: Normal heart sounds. No murmur heard.  Pulmonary:      Effort: Pulmonary effort is normal. No respiratory distress.      Breath sounds: Normal breath sounds. No stridor. No wheezing, rhonchi or rales.   Abdominal:      General: There is no distension.   Musculoskeletal:          General: Normal range of motion.      Cervical back: Normal range of motion.   Skin:     General: Skin is warm and dry.   Neurological:      Mental Status: She is alert and oriented to person, place, and time.   Psychiatric:         Mood and Affect: Mood normal.         Behavior: Behavior normal.             Past Medical History:   Diagnosis Date    Anxiety 2024    Reports she has been out of work since 2023 for rotator cuff injury  She should return to work at end of March  She reports she has increased anxiety, mostly at night - states she has palpitation, overthinking, anxious about being out of work.  She also report social anxiety around large crowds such as during [place].  She states anxiety episodes around once a week.  Started on Buspar     Environmental and seasonal allergies 2024    Complains of Runny nose/sneezing/itchy eyes  Not controlled on Claritin  Will treat with Bromfed DM and Flonase nasal spray  If no improvement in a week - come into office for evaluation.         Past Surgical History:   Procedure Laterality Date    ANTERIOR CRUCIATE LIGAMENT REPAIR      HERNIA REPAIR         Family History   Problem Relation Name Age of Onset    Diabetes Mother shireen     No Known Problems Half-sister Mary Beth     Breast cancer Neg Hx      Ovarian cancer Neg Hx      Colon cancer Neg Hx         Social History     Socioeconomic History    Marital status: Single   Tobacco Use    Smoking status: Former     Types: Vaping with nicotine     Start date: 2020     Quit date: 2023     Years since quittin.5    Smokeless tobacco: Never   Substance and Sexual Activity    Alcohol use: Yes     Alcohol/week: 1.0 standard drink of alcohol     Types: 1 Drinks containing 0.5 oz of alcohol per week     Comment: occas    Drug use: Not Currently     Types: Marijuana     Comment: daily-    Sexual activity: Yes     Partners: Female     Birth control/protection: None, Implant     Comment: Condom  sometimes     Social Drivers of Health     Financial Resource Strain: Low Risk  (6/9/2025)    Overall Financial Resource Strain (CARDIA)     Difficulty of Paying Living Expenses: Not hard at all   Food Insecurity: No Food Insecurity (6/9/2025)    Hunger Vital Sign     Worried About Running Out of Food in the Last Year: Never true     Ran Out of Food in the Last Year: Never true   Transportation Needs: No Transportation Needs (6/9/2025)    PRAPARE - Transportation     Lack of Transportation (Medical): No     Lack of Transportation (Non-Medical): No   Physical Activity: Sufficiently Active (6/9/2025)    Exercise Vital Sign     Days of Exercise per Week: 3 days     Minutes of Exercise per Session: 60 min   Stress: Stress Concern Present (6/9/2025)    [employer] - Occupational Stress Questionnaire     Feeling of Stress : To some extent   Housing Stability: Low Risk  (6/9/2025)    Housing Stability Vital Sign     Unable to Pay for Housing in the Last Year: No     Homeless in the Last Year: No

## 2025-06-11 ENCOUNTER — TELEPHONE (OUTPATIENT)
Dept: ALLERGY | Facility: CLINIC | Age: 30
End: 2025-06-11
Payer: COMMERCIAL

## 2025-08-25 ENCOUNTER — OFFICE VISIT (OUTPATIENT)
Dept: OBSTETRICS AND GYNECOLOGY | Facility: CLINIC | Age: 30
End: 2025-08-25
Payer: COMMERCIAL

## 2025-08-25 VITALS
SYSTOLIC BLOOD PRESSURE: 110 MMHG | BODY MASS INDEX: 25.55 KG/M2 | DIASTOLIC BLOOD PRESSURE: 70 MMHG | HEIGHT: 62 IN | WEIGHT: 138.88 LBS

## 2025-08-25 DIAGNOSIS — Z97.5 BREAKTHROUGH BLEEDING ON NEXPLANON: Primary | ICD-10-CM

## 2025-08-25 DIAGNOSIS — Z97.5 NEXPLANON IN PLACE: ICD-10-CM

## 2025-08-25 DIAGNOSIS — N92.1 BREAKTHROUGH BLEEDING ON NEXPLANON: Primary | ICD-10-CM

## 2025-08-25 LAB
B-HCG UR QL: NEGATIVE
CTP QC/QA: YES

## 2025-08-25 PROCEDURE — 3078F DIAST BP <80 MM HG: CPT | Mod: CPTII,S$GLB,, | Performed by: NURSE PRACTITIONER

## 2025-08-25 PROCEDURE — 99999 PR PBB SHADOW E&M-EST. PATIENT-LVL III: CPT | Mod: PBBFAC,,, | Performed by: NURSE PRACTITIONER

## 2025-08-25 PROCEDURE — 3074F SYST BP LT 130 MM HG: CPT | Mod: CPTII,S$GLB,, | Performed by: NURSE PRACTITIONER

## 2025-08-25 PROCEDURE — 1159F MED LIST DOCD IN RCRD: CPT | Mod: CPTII,S$GLB,, | Performed by: NURSE PRACTITIONER

## 2025-08-25 PROCEDURE — 3044F HG A1C LEVEL LT 7.0%: CPT | Mod: CPTII,S$GLB,, | Performed by: NURSE PRACTITIONER

## 2025-08-25 PROCEDURE — 81025 URINE PREGNANCY TEST: CPT | Mod: S$GLB,,, | Performed by: NURSE PRACTITIONER

## 2025-08-25 PROCEDURE — 99213 OFFICE O/P EST LOW 20 MIN: CPT | Mod: 25,S$GLB,, | Performed by: NURSE PRACTITIONER

## 2025-08-25 PROCEDURE — 3008F BODY MASS INDEX DOCD: CPT | Mod: CPTII,S$GLB,, | Performed by: NURSE PRACTITIONER
